# Patient Record
Sex: FEMALE | Race: WHITE | Employment: OTHER | URBAN - METROPOLITAN AREA
[De-identification: names, ages, dates, MRNs, and addresses within clinical notes are randomized per-mention and may not be internally consistent; named-entity substitution may affect disease eponyms.]

---

## 2018-03-27 PROBLEM — I10 ESSENTIAL HYPERTENSION: Status: ACTIVE | Noted: 2018-03-27

## 2018-03-27 PROBLEM — Z95.5 H/O HEART ARTERY STENT: Status: ACTIVE | Noted: 2018-03-27

## 2018-03-27 PROBLEM — I44.7 LBBB (LEFT BUNDLE BRANCH BLOCK): Status: ACTIVE | Noted: 2018-03-27

## 2021-02-03 ENCOUNTER — APPOINTMENT (OUTPATIENT)
Dept: GENERAL RADIOLOGY | Age: 81
DRG: 287 | End: 2021-02-03
Attending: EMERGENCY MEDICINE
Payer: MEDICARE

## 2021-02-03 ENCOUNTER — HOSPITAL ENCOUNTER (INPATIENT)
Age: 81
LOS: 1 days | Discharge: HOME | DRG: 287 | End: 2021-02-04
Attending: EMERGENCY MEDICINE | Admitting: INTERNAL MEDICINE
Payer: MEDICARE

## 2021-02-03 DIAGNOSIS — I47.29 NSVT (NONSUSTAINED VENTRICULAR TACHYCARDIA): ICD-10-CM

## 2021-02-03 DIAGNOSIS — K21.9 GASTROESOPHAGEAL REFLUX DISEASE WITHOUT ESOPHAGITIS: ICD-10-CM

## 2021-02-03 DIAGNOSIS — I25.10 CORONARY ARTERY DISEASE INVOLVING NATIVE CORONARY ARTERY OF NATIVE HEART WITHOUT ANGINA PECTORIS: ICD-10-CM

## 2021-02-03 LAB
ALBUMIN SERPL-MCNC: 4 G/DL (ref 3.2–4.6)
ALBUMIN/GLOB SERPL: 1 {RATIO} (ref 1.2–3.5)
ALP SERPL-CCNC: 130 U/L (ref 50–136)
ALT SERPL-CCNC: 23 U/L (ref 12–65)
ANION GAP SERPL CALC-SCNC: 6 MMOL/L (ref 7–16)
AST SERPL-CCNC: 27 U/L (ref 15–37)
ATRIAL RATE: 111 BPM
BASOPHILS # BLD: 0.1 K/UL (ref 0–0.2)
BASOPHILS NFR BLD: 1 % (ref 0–2)
BILIRUB SERPL-MCNC: 0.6 MG/DL (ref 0.2–1.1)
BUN SERPL-MCNC: 11 MG/DL (ref 8–23)
CALCIUM SERPL-MCNC: 9 MG/DL (ref 8.3–10.4)
CALCULATED P AXIS, ECG09: 82 DEGREES
CALCULATED R AXIS, ECG10: 54 DEGREES
CALCULATED T AXIS, ECG11: -165 DEGREES
CHLORIDE SERPL-SCNC: 107 MMOL/L (ref 98–107)
CO2 SERPL-SCNC: 29 MMOL/L (ref 21–32)
CREAT SERPL-MCNC: 0.67 MG/DL (ref 0.6–1)
DIAGNOSIS, 93000: NORMAL
DIFFERENTIAL METHOD BLD: NORMAL
EOSINOPHIL # BLD: 0.2 K/UL (ref 0–0.8)
EOSINOPHIL NFR BLD: 3 % (ref 0.5–7.8)
ERYTHROCYTE [DISTWIDTH] IN BLOOD BY AUTOMATED COUNT: 13.6 % (ref 11.9–14.6)
GLOBULIN SER CALC-MCNC: 4.1 G/DL (ref 2.3–3.5)
GLUCOSE SERPL-MCNC: 105 MG/DL (ref 65–100)
HCT VFR BLD AUTO: 43.6 % (ref 35.8–46.3)
HGB BLD-MCNC: 14.1 G/DL (ref 11.7–15.4)
IMM GRANULOCYTES # BLD AUTO: 0 K/UL (ref 0–0.5)
IMM GRANULOCYTES NFR BLD AUTO: 0 % (ref 0–5)
LYMPHOCYTES # BLD: 3 K/UL (ref 0.5–4.6)
LYMPHOCYTES NFR BLD: 38 % (ref 13–44)
MAGNESIUM SERPL-MCNC: 2.1 MG/DL (ref 1.8–2.4)
MCH RBC QN AUTO: 27.6 PG (ref 26.1–32.9)
MCHC RBC AUTO-ENTMCNC: 32.3 G/DL (ref 31.4–35)
MCV RBC AUTO: 85.3 FL (ref 79.6–97.8)
MONOCYTES # BLD: 0.7 K/UL (ref 0.1–1.3)
MONOCYTES NFR BLD: 8 % (ref 4–12)
NEUTS SEG # BLD: 3.9 K/UL (ref 1.7–8.2)
NEUTS SEG NFR BLD: 50 % (ref 43–78)
NRBC # BLD: 0 K/UL (ref 0–0.2)
P-R INTERVAL, ECG05: 164 MS
PLATELET # BLD AUTO: 238 K/UL (ref 150–450)
PMV BLD AUTO: 11.2 FL (ref 9.4–12.3)
POTASSIUM SERPL-SCNC: 3 MMOL/L (ref 3.5–5.1)
PROT SERPL-MCNC: 8.1 G/DL (ref 6.3–8.2)
Q-T INTERVAL, ECG07: 354 MS
QRS DURATION, ECG06: 146 MS
QTC CALCULATION (BEZET), ECG08: 481 MS
RBC # BLD AUTO: 5.11 M/UL (ref 4.05–5.2)
SODIUM SERPL-SCNC: 142 MMOL/L (ref 136–145)
TROPONIN-HIGH SENSITIVITY: 20.9 PG/ML (ref 0–14)
TROPONIN-HIGH SENSITIVITY: 21.2 PG/ML (ref 0–14)
TSH SERPL DL<=0.005 MIU/L-ACNC: 5.59 UIU/ML (ref 0.36–3.74)
VENTRICULAR RATE, ECG03: 111 BPM
WBC # BLD AUTO: 7.8 K/UL (ref 4.3–11.1)

## 2021-02-03 PROCEDURE — 99153 MOD SED SAME PHYS/QHP EA: CPT

## 2021-02-03 PROCEDURE — 93005 ELECTROCARDIOGRAM TRACING: CPT | Performed by: EMERGENCY MEDICINE

## 2021-02-03 PROCEDURE — 84443 ASSAY THYROID STIM HORMONE: CPT

## 2021-02-03 PROCEDURE — 93458 L HRT ARTERY/VENTRICLE ANGIO: CPT | Performed by: INTERNAL MEDICINE

## 2021-02-03 PROCEDURE — 74011250636 HC RX REV CODE- 250/636: Performed by: EMERGENCY MEDICINE

## 2021-02-03 PROCEDURE — B2111ZZ FLUOROSCOPY OF MULTIPLE CORONARY ARTERIES USING LOW OSMOLAR CONTRAST: ICD-10-PCS | Performed by: INTERNAL MEDICINE

## 2021-02-03 PROCEDURE — 99285 EMERGENCY DEPT VISIT HI MDM: CPT

## 2021-02-03 PROCEDURE — 99152 MOD SED SAME PHYS/QHP 5/>YRS: CPT | Performed by: INTERNAL MEDICINE

## 2021-02-03 PROCEDURE — 2709999900 HC NON-CHARGEABLE SUPPLY

## 2021-02-03 PROCEDURE — 65660000000 HC RM CCU STEPDOWN

## 2021-02-03 PROCEDURE — 85025 COMPLETE CBC W/AUTO DIFF WBC: CPT

## 2021-02-03 PROCEDURE — 96374 THER/PROPH/DIAG INJ IV PUSH: CPT

## 2021-02-03 PROCEDURE — 99223 1ST HOSP IP/OBS HIGH 75: CPT | Performed by: INTERNAL MEDICINE

## 2021-02-03 PROCEDURE — 77030040361 HC SLV COMPR DVT MDII -B

## 2021-02-03 PROCEDURE — 74011250637 HC RX REV CODE- 250/637: Performed by: INTERNAL MEDICINE

## 2021-02-03 PROCEDURE — 77030016699 HC CATH ANGI DX INFN1 CARD -A

## 2021-02-03 PROCEDURE — C1769 GUIDE WIRE: HCPCS

## 2021-02-03 PROCEDURE — 77030015766

## 2021-02-03 PROCEDURE — 74011250636 HC RX REV CODE- 250/636: Performed by: INTERNAL MEDICINE

## 2021-02-03 PROCEDURE — 99152 MOD SED SAME PHYS/QHP 5/>YRS: CPT

## 2021-02-03 PROCEDURE — 93458 L HRT ARTERY/VENTRICLE ANGIO: CPT

## 2021-02-03 PROCEDURE — 83735 ASSAY OF MAGNESIUM: CPT

## 2021-02-03 PROCEDURE — 74011250637 HC RX REV CODE- 250/637: Performed by: NURSE PRACTITIONER

## 2021-02-03 PROCEDURE — C1894 INTRO/SHEATH, NON-LASER: HCPCS

## 2021-02-03 PROCEDURE — 71045 X-RAY EXAM CHEST 1 VIEW: CPT

## 2021-02-03 PROCEDURE — 4A023N7 MEASUREMENT OF CARDIAC SAMPLING AND PRESSURE, LEFT HEART, PERCUTANEOUS APPROACH: ICD-10-PCS | Performed by: INTERNAL MEDICINE

## 2021-02-03 PROCEDURE — 74011000636 HC RX REV CODE- 636: Performed by: INTERNAL MEDICINE

## 2021-02-03 PROCEDURE — 74011000250 HC RX REV CODE- 250: Performed by: INTERNAL MEDICINE

## 2021-02-03 PROCEDURE — 80053 COMPREHEN METABOLIC PANEL: CPT

## 2021-02-03 PROCEDURE — 74011250636 HC RX REV CODE- 250/636: Performed by: NURSE PRACTITIONER

## 2021-02-03 PROCEDURE — 77030042317 HC BND COMPR HEMSTAT -B

## 2021-02-03 PROCEDURE — 84484 ASSAY OF TROPONIN QUANT: CPT

## 2021-02-03 PROCEDURE — 96375 TX/PRO/DX INJ NEW DRUG ADDON: CPT

## 2021-02-03 RX ORDER — POTASSIUM CHLORIDE 20 MEQ/1
20 TABLET, EXTENDED RELEASE ORAL DAILY
Status: DISCONTINUED | OUTPATIENT
Start: 2021-02-04 | End: 2021-02-04 | Stop reason: HOSPADM

## 2021-02-03 RX ORDER — LIDOCAINE HYDROCHLORIDE 10 MG/ML
1-20 INJECTION, SOLUTION EPIDURAL; INFILTRATION; INTRACAUDAL; PERINEURAL ONCE
Status: COMPLETED | OUTPATIENT
Start: 2021-02-03 | End: 2021-02-03

## 2021-02-03 RX ORDER — CLOPIDOGREL BISULFATE 75 MG/1
75 TABLET ORAL DAILY
Status: DISCONTINUED | OUTPATIENT
Start: 2021-02-03 | End: 2021-02-04 | Stop reason: HOSPADM

## 2021-02-03 RX ORDER — PANTOPRAZOLE SODIUM 40 MG/1
40 TABLET, DELAYED RELEASE ORAL
Status: DISCONTINUED | OUTPATIENT
Start: 2021-02-04 | End: 2021-02-04 | Stop reason: HOSPADM

## 2021-02-03 RX ORDER — SODIUM CHLORIDE 0.9 % (FLUSH) 0.9 %
5-40 SYRINGE (ML) INJECTION EVERY 8 HOURS
Status: DISCONTINUED | OUTPATIENT
Start: 2021-02-03 | End: 2021-02-04 | Stop reason: HOSPADM

## 2021-02-03 RX ORDER — SUMATRIPTAN 50 MG/1
100 TABLET, FILM COATED ORAL
Status: DISPENSED | OUTPATIENT
Start: 2021-02-03 | End: 2021-02-04

## 2021-02-03 RX ORDER — POTASSIUM CHLORIDE 14.9 MG/ML
20 INJECTION INTRAVENOUS
Status: COMPLETED | OUTPATIENT
Start: 2021-02-03 | End: 2021-02-03

## 2021-02-03 RX ORDER — AMIODARONE HYDROCHLORIDE 150 MG/3ML
INJECTION, SOLUTION INTRAVENOUS
Status: DISCONTINUED
Start: 2021-02-03 | End: 2021-02-03

## 2021-02-03 RX ORDER — SODIUM CHLORIDE 9 MG/ML
75 INJECTION, SOLUTION INTRAVENOUS CONTINUOUS
Status: DISCONTINUED | OUTPATIENT
Start: 2021-02-03 | End: 2021-02-03

## 2021-02-03 RX ORDER — EZETIMIBE 10 MG/1
10 TABLET ORAL DAILY
Status: DISCONTINUED | OUTPATIENT
Start: 2021-02-03 | End: 2021-02-04 | Stop reason: HOSPADM

## 2021-02-03 RX ORDER — POTASSIUM CHLORIDE 20 MEQ/1
40 TABLET, EXTENDED RELEASE ORAL
Status: COMPLETED | OUTPATIENT
Start: 2021-02-03 | End: 2021-02-03

## 2021-02-03 RX ORDER — MAG HYDROX/ALUMINUM HYD/SIMETH 200-200-20
30 SUSPENSION, ORAL (FINAL DOSE FORM) ORAL
Status: DISCONTINUED | OUTPATIENT
Start: 2021-02-03 | End: 2021-02-04 | Stop reason: HOSPADM

## 2021-02-03 RX ORDER — SODIUM CHLORIDE 0.9 % (FLUSH) 0.9 %
5-40 SYRINGE (ML) INJECTION AS NEEDED
Status: DISCONTINUED | OUTPATIENT
Start: 2021-02-03 | End: 2021-02-03

## 2021-02-03 RX ORDER — SODIUM CHLORIDE 9 MG/ML
75 INJECTION, SOLUTION INTRAVENOUS CONTINUOUS
Status: DISCONTINUED | OUTPATIENT
Start: 2021-02-03 | End: 2021-02-04 | Stop reason: HOSPADM

## 2021-02-03 RX ORDER — CALCITRIOL 0.25 UG/1
0.5 CAPSULE ORAL DAILY
Status: DISCONTINUED | OUTPATIENT
Start: 2021-02-03 | End: 2021-02-04 | Stop reason: HOSPADM

## 2021-02-03 RX ORDER — METOPROLOL TARTRATE 5 MG/5ML
5 INJECTION INTRAVENOUS
Status: DISPENSED | OUTPATIENT
Start: 2021-02-03 | End: 2021-02-03

## 2021-02-03 RX ORDER — MIDAZOLAM HYDROCHLORIDE 1 MG/ML
.5-5 INJECTION, SOLUTION INTRAMUSCULAR; INTRAVENOUS
Status: DISCONTINUED | OUTPATIENT
Start: 2021-02-03 | End: 2021-02-03

## 2021-02-03 RX ORDER — CARVEDILOL 6.25 MG/1
6.25 TABLET ORAL 2 TIMES DAILY WITH MEALS
Status: DISCONTINUED | OUTPATIENT
Start: 2021-02-03 | End: 2021-02-04 | Stop reason: HOSPADM

## 2021-02-03 RX ORDER — FLUTICASONE PROPIONATE 50 MCG
2 SPRAY, SUSPENSION (ML) NASAL DAILY
Status: DISCONTINUED | OUTPATIENT
Start: 2021-02-03 | End: 2021-02-04 | Stop reason: HOSPADM

## 2021-02-03 RX ORDER — ZOLPIDEM TARTRATE 10 MG/1
10 TABLET ORAL
COMMUNITY

## 2021-02-03 RX ORDER — HEPARIN SODIUM 200 [USP'U]/100ML
2 INJECTION, SOLUTION INTRAVENOUS CONTINUOUS
Status: DISCONTINUED | OUTPATIENT
Start: 2021-02-03 | End: 2021-02-03

## 2021-02-03 RX ORDER — SODIUM CHLORIDE 0.9 % (FLUSH) 0.9 %
5-40 SYRINGE (ML) INJECTION AS NEEDED
Status: DISCONTINUED | OUTPATIENT
Start: 2021-02-03 | End: 2021-02-04 | Stop reason: HOSPADM

## 2021-02-03 RX ORDER — GUAIFENESIN 100 MG/5ML
81 LIQUID (ML) ORAL DAILY
Status: DISCONTINUED | OUTPATIENT
Start: 2021-02-03 | End: 2021-02-04 | Stop reason: HOSPADM

## 2021-02-03 RX ORDER — SODIUM CHLORIDE 0.9 % (FLUSH) 0.9 %
5-40 SYRINGE (ML) INJECTION EVERY 8 HOURS
Status: DISCONTINUED | OUTPATIENT
Start: 2021-02-03 | End: 2021-02-03

## 2021-02-03 RX ORDER — FENTANYL CITRATE 50 UG/ML
25-100 INJECTION, SOLUTION INTRAMUSCULAR; INTRAVENOUS
Status: DISCONTINUED | OUTPATIENT
Start: 2021-02-03 | End: 2021-02-03

## 2021-02-03 RX ORDER — NITROGLYCERIN 0.4 MG/1
0.4 TABLET SUBLINGUAL
Status: DISCONTINUED | OUTPATIENT
Start: 2021-02-03 | End: 2021-02-04 | Stop reason: HOSPADM

## 2021-02-03 RX ORDER — MORPHINE SULFATE 2 MG/ML
2 INJECTION, SOLUTION INTRAMUSCULAR; INTRAVENOUS
Status: DISCONTINUED | OUTPATIENT
Start: 2021-02-03 | End: 2021-02-04 | Stop reason: HOSPADM

## 2021-02-03 RX ORDER — AMIODARONE HYDROCHLORIDE 150 MG/3ML
150 INJECTION, SOLUTION INTRAVENOUS
Status: COMPLETED | OUTPATIENT
Start: 2021-02-03 | End: 2021-02-03

## 2021-02-03 RX ORDER — LOSARTAN POTASSIUM 50 MG/1
50 TABLET ORAL DAILY
Status: DISCONTINUED | OUTPATIENT
Start: 2021-02-03 | End: 2021-02-04 | Stop reason: HOSPADM

## 2021-02-03 RX ORDER — ZOLPIDEM TARTRATE 5 MG/1
10 TABLET ORAL
Status: DISCONTINUED | OUTPATIENT
Start: 2021-02-03 | End: 2021-02-04 | Stop reason: HOSPADM

## 2021-02-03 RX ADMIN — POTASSIUM CHLORIDE 20 MEQ: 14.9 INJECTION, SOLUTION INTRAVENOUS at 08:03

## 2021-02-03 RX ADMIN — SODIUM CHLORIDE 75 ML/HR: 900 INJECTION, SOLUTION INTRAVENOUS at 17:54

## 2021-02-03 RX ADMIN — ALUMINUM HYDROXIDE, MAGNESIUM HYDROXIDE, AND SIMETHICONE 30 ML: 200; 200; 20 SUSPENSION ORAL at 22:31

## 2021-02-03 RX ADMIN — CLOPIDOGREL BISULFATE 75 MG: 75 TABLET ORAL at 10:29

## 2021-02-03 RX ADMIN — IOPAMIDOL 110 ML: 755 INJECTION, SOLUTION INTRAVENOUS at 15:29

## 2021-02-03 RX ADMIN — HEPARIN SODIUM 2 ML: 10000 INJECTION, SOLUTION INTRAVENOUS; SUBCUTANEOUS at 15:19

## 2021-02-03 RX ADMIN — METOPROLOL TARTRATE 5 MG: 1 INJECTION, SOLUTION INTRAVENOUS at 07:34

## 2021-02-03 RX ADMIN — LIDOCAINE HYDROCHLORIDE 3 ML: 10 INJECTION, SOLUTION EPIDURAL; INFILTRATION; INTRACAUDAL; PERINEURAL at 15:17

## 2021-02-03 RX ADMIN — CALCITRIOL 0.5 MCG: 0.25 CAPSULE ORAL at 10:29

## 2021-02-03 RX ADMIN — Medication 10 ML: at 17:39

## 2021-02-03 RX ADMIN — HEPARIN SODIUM 2 UNITS/HR: 5000 INJECTION, SOLUTION INTRAVENOUS; SUBCUTANEOUS at 15:19

## 2021-02-03 RX ADMIN — MIDAZOLAM 1 MG: 1 INJECTION INTRAMUSCULAR; INTRAVENOUS at 15:16

## 2021-02-03 RX ADMIN — ASPIRIN 81 MG: 81 TABLET, CHEWABLE ORAL at 10:28

## 2021-02-03 RX ADMIN — EZETIMIBE 10 MG: 10 TABLET ORAL at 10:27

## 2021-02-03 RX ADMIN — FLUTICASONE PROPIONATE 2 SPRAY: 50 SPRAY, METERED NASAL at 10:33

## 2021-02-03 RX ADMIN — Medication 10 ML: at 22:34

## 2021-02-03 RX ADMIN — POTASSIUM CHLORIDE 40 MEQ: 20 TABLET, EXTENDED RELEASE ORAL at 08:02

## 2021-02-03 RX ADMIN — DRONEDARONE 400 MG: 400 TABLET, FILM COATED ORAL at 17:39

## 2021-02-03 RX ADMIN — SODIUM CHLORIDE 75 ML/HR: 900 INJECTION, SOLUTION INTRAVENOUS at 10:37

## 2021-02-03 RX ADMIN — FENTANYL CITRATE 25 MCG: 50 INJECTION, SOLUTION INTRAMUSCULAR; INTRAVENOUS at 15:05

## 2021-02-03 RX ADMIN — ZOLPIDEM TARTRATE 10 MG: 5 TABLET ORAL at 22:31

## 2021-02-03 RX ADMIN — AMIODARONE HYDROCHLORIDE 150 MG: 150 INJECTION, SOLUTION INTRAVENOUS at 07:17

## 2021-02-03 RX ADMIN — CARVEDILOL 6.25 MG: 6.25 TABLET, FILM COATED ORAL at 17:38

## 2021-02-03 RX ADMIN — LOSARTAN POTASSIUM 50 MG: 50 TABLET, FILM COATED ORAL at 10:00

## 2021-02-03 RX ADMIN — POTASSIUM CHLORIDE 20 MEQ: 14.9 INJECTION, SOLUTION INTRAVENOUS at 10:37

## 2021-02-03 RX ADMIN — Medication 10 ML: at 17:42

## 2021-02-03 RX ADMIN — MIDAZOLAM 1 MG: 1 INJECTION INTRAMUSCULAR; INTRAVENOUS at 15:05

## 2021-02-03 NOTE — PROGRESS NOTES
Pt presented to [facility] via ambo complaining of rapid heart rate and CP. The patient reported that the symptoms have been intermittent and did not awaken her from sleep. Pt does have a history of coronary disease with a stent placed about 13 years ago. At this time there are no dc needs identified but will continue to monitor. Care Management Interventions  PCP Verified by CM: Yes(Dr. Maurice Chapa MD.)  Mode of Transport at Discharge:  Other (see comment)  Transition of Care Consult (CM Consult): Discharge Planning  Current Support Network: Family Lives [place], Own Home  Confirm Follow Up Transport: Family  The Plan for Transition of Care is Related to the Following Treatment Goals : Return to her baseline  Discharge Location  Discharge Placement: Home

## 2021-02-03 NOTE — PROGRESS NOTES
TRANSFER - IN REPORT:    Verbal report received from The [address] on Genoveva Mole being received from ER (at [id number]) for routine progression of care. Report consisted of patients Situation, Background, Assessment and Recommendations(SBAR). Information from the following report(s) SBAR, Kardex, ED Summary, Intake/Output, MAR, Recent Results and Cardiac Rhythm SB with Wide Complex was reviewed. Opportunity for questions and clarification was provided. Assessment completed upon patients arrival to unit and care assumed. Patient received to room 301. Patient connected to monitor and assessment completed. Plan of care reviewed. Patient oriented to room and call light. Patient aware to use call light to communicate any chest pain or needs.

## 2021-02-03 NOTE — ED NOTES
TRANSFER - OUT REPORT:    Verbal report given to Zeyad(name) on Sariah Vanda  being transferred to 301(unit) for routine progression of care       Report consisted of patients Situation, Background, Assessment and   Recommendations(SBAR). Information from the following report(s) SBAR, Kardex, ED Summary and MAR was reviewed with the receiving nurse. Lines:   Peripheral IV 02/03/21 Right Antecubital (Active)   Site Assessment Clean, dry, & intact 02/03/21 0708   Phlebitis Assessment 0 02/03/21 0708   Infiltration Assessment 0 02/03/21 0708   Dressing Status Clean, dry, & intact 02/03/21 0708   Dressing Type 4 X 4 02/03/21 0708   Hub Color/Line Status Pink 02/03/21 0708   Alcohol Cap Used Yes 02/03/21 0708       Peripheral IV 02/03/21 Left Antecubital (Active)   Site Assessment Clean, dry, & intact 02/03/21 0731   Phlebitis Assessment 0 02/03/21 0731   Infiltration Assessment 0 02/03/21 0731   Dressing Status Clean, dry, & intact 02/03/21 0731        Opportunity for questions and clarification was provided.       Patient transported with:   O2 @ 2 liters monitor

## 2021-02-03 NOTE — PROGRESS NOTES
TRANSFER - IN REPORT:    Verbal report received from [facility] (name) on Vijay Penn  being received from [facility] (unit) for routine progression of care      Report consisted of patients Situation, Background, Assessment and   Recommendations(SBAR). Information from the following report(s) SBAR, Procedure Summary, MAR and Recent Results was reviewed with the receiving nurse. Opportunity for questions and clarification was provided. Assessment completed upon patients arrival to unit and care assumed.       Skin update: RR site with TR band no issues

## 2021-02-03 NOTE — PROGRESS NOTES
TRANSFER - OUT REPORT:  [facility] by Dr. Zofia Cagle  Right radial access  Right wrist TR band with 11ml  Versed 2mg IV  Fentanyl 25mcg IV  Access site soft, clean, dry, and intact; with no signs of bleeding or hematoma  Pt. Tolerated procedure    Verbal report given to RN(name) on Mirian Samuels  being transferred to [facility] (unit) for routine progression of care       Report consisted of patients Situation, Background, Assessment and   Recommendations(SBAR). Information from the following report(s) Procedure Summary and MAR was reviewed with the receiving nurse. Lines:   Peripheral IV 02/03/21 Right Antecubital (Active)   Site Assessment Clean, dry, & intact 02/03/21 0935   Phlebitis Assessment 0 02/03/21 0935   Infiltration Assessment 0 02/03/21 0935   Dressing Status Clean, dry, & intact 02/03/21 0935   Dressing Type Transparent 02/03/21 0935   Hub Color/Line Status Pink;Flushed 02/03/21 0935   Alcohol Cap Used Yes 02/03/21 0708       Peripheral IV 02/03/21 Left Antecubital (Active)   Site Assessment Clean, dry, & intact 02/03/21 0935   Phlebitis Assessment 0 02/03/21 0935   Infiltration Assessment 0 02/03/21 0935   Dressing Status Clean, dry, & intact 02/03/21 0935   Dressing Type Transparent 02/03/21 0935   Hub Color/Line Status Pink; Infusing 02/03/21 0935        Opportunity for questions and clarification was provided.       Patient transported with:   [employer]

## 2021-02-03 NOTE — PROCEDURES
Brief Cardiac Procedure Note    Patient: Umm Dougherty MRN: [id number]  SSN: [id number]    YOB: 1940  Age: 68 y.o. Sex: female      Date of Procedure: 2/3/2021     Pre-procedure Diagnosis: Typical Angina    Post-procedure Diagnosis: Non-cardiac Chest Pain    Reason for Procedure: ACS < or = 24 Hours    Procedure: Left Heart Catheterization    Brief Description of Procedure: lhc via right radial, tr    Performed By: Zohra Vera MD     Assistants: none    Anesthesia: Moderate Sedation    Estimated Blood Loss: Less than 10 mL      Specimens: None    Implants: None    Findings: mild cad, nl lvef    Complications: None    Recommendations: Continue medical therapy.     Signed By: Zohra Vera MD     February 3, 2021

## 2021-02-03 NOTE — H&P
[address] [place] [address]  HISTORY AND PHYSICAL    Name:  Gabby Stevenson  MR#:  [id number]  :  1940  ACCOUNT #:  [de-identified]  ADMIT DATE:  2021      CHIEF COMPLAINT:  Chest pain and palpitations. HISTORY OF PRESENT ILLNESS:  The patient is a 24-year-old female with acute onset of chest pain and palpitations last night. The symptoms were intermittent throughout the night. Chest pain was a heaviness, retrosternal, nonradiating, associated with shortness of breath, aggravated by the palpitations, no alleviating factors until the palpitations resolved. She has a history of coronary artery disease with a stent in , hyperlipidemia, macular degeneration, chronic left bundle branch block. Presentation to the ER shows a wide complex tachycardia with most likely ventricular tachycardia. SOCIAL HISTORY:  She does not smoke. FAMILY HISTORY:  Her father had coronary artery disease at a young age. PAST MEDICAL HISTORY:  As noted above. Also includes GERD and hypertension. REVIEW OF SYSTEMS:  GENERAL:  No fevers or chills. HEAD:  No headaches. NOSE:  No rhinorrhea. RESPIRATORY:  No cough. GASTROINTESTINAL:  No nausea, vomiting or diarrhea. GENITOURINARY:  No dysuria. ENDOCRINE:  No temperature intolerances. MUSCULOSKELETAL:  No myalgias or arthralgias. SKIN:  No rashes. EYES:  No visual changes. NEUROLOGIC:  No CVA symptoms. MEDICATIONS:  1.  Norvasc 5 mg a day. 2.  Plavix 75 mg a day. 3.  Coreg 6.25 mg b.i.d.  4.  Zetia 10 mg a day. 5.  Cozaar 50 mg a day. 6.  Prilosec 40 mg a day. ALLERGIES:  INCLUDE PENICILLIN AND SULFA. PHYSICAL EXAMINATION:  VITAL SIGNS:  Heart rate is always into the 180s, blood pressure 165/95. GENERAL:  A well-developed, well-nourished female in no acute distress. Alert and oriented x3 with appropriate mood and affect. HEENT:  Sclerae clear. CARDIOVASCULAR:  S1, S2 regular. LUNGS:  Clear. ABDOMEN:  Soft. EXTREMITIES:  No edema.   SKIN: Warm and dry. NECK:  Supple. Trachea midline. DIAGNOSTIC DATA:  Telemetry now shows normal sinus rhythm with left bundle branch block. Pertinent Labs: White count 7.8, hemoglobin 14.1, platelets 997. Potassium is 3, creatinine is 0.67. Magnesium is 2.1. Troponin is 0.21.    ASSESSMENT:  A 53-year-old female with wide complex tachycardia in the setting of hypokalemia and known coronary artery disease with accompanying chest pain. PLAN:  We will replete her potassium, proceed with catheterization. Risks and benefits of the procedure have been explained to the patient who agrees to proceed. EP evaluation will be performed after the catheterization.       Faisal Mejia MD      [id number]  D:  02/03/2021 8:16  T:  02/03/2021 9:42  JOB #:  [id number]

## 2021-02-03 NOTE — ED TRIAGE NOTES
Patient arrives to ED pov from home. Patient states when she woke up this morning she felt like her heart rate was \"fluttering and racing fast\". Patient states she felt a lot of burning in her chest. Patient also reports SOB, nausea, and lightheadedness. Patient has cardiac history.

## 2021-02-03 NOTE — ED PROVIDER NOTES
Patient arrived by private transportation with complaints of rapid heart rate and chest discomfort onset about 8 PM last night. The patient reports the symptoms have been intermittent and did not awaken her from sleep. She did noted recurring this morning. She was brought to the emergency room by her daughter who is a CRNA. Does have a history of coronary disease with a stent placed about 13 years ago. She is followed by [facility] cardiology. After being placed on the monitor patient noted to have a wide-complex tachycardia with a rate of 180. The history is provided by the patient, a relative and medical records. Palpitations   This is a new problem. The current episode started 6 to 12 hours ago. The problem has not changed since onset. The problem occurs hourly. The problem is associated with an unknown factor. Associated symptoms include chest pain, weakness and shortness of breath. Pertinent negatives include no diaphoresis, no fever, no malaise/fatigue, no numbness, no chest pressure, no claudication, no exertional chest pressure, no near-syncope, no orthopnea, no syncope, no abdominal pain, no vomiting, no headaches, no back pain, no leg pain, no lower extremity edema, no dizziness, no cough, no hemoptysis and no sputum production. Risk factors include no risk factors. She has tried nothing for the symptoms. The treatment provided no relief.         Past Medical History:   Diagnosis Date    Arrhythmia     FAST HEART BEAT    Arthritis     OSTEO    Bell's palsy 12/7/2015    Bell's palsy     Cancer ([facility] [address]     melona    Cataract  NOS 12/7/2015    Coronary artery disease 6/23/2009    GERD (gastroesophageal reflux disease)     ACID REFLUX    Hx of varicose vein ligation and stripping 5/16/2016    LEFT 30+ yrs ago    Hyperlipidemia 12/7/2015    Hypertension     Macular degeneration (senile), unspecified 12/7/2015    Nerve damage     from shingles    Osteoarthrosis involving multiple sites but not generalized 12/7/2015    Osteoporosis   NOS 12/7/2015    Varicose vein of leg        Past Surgical History:   Procedure Laterality Date    HX APPENDECTOMY      HX HEART CATHETERIZATION      with 1 stent    HX HYSTERECTOMY      HX OTHER SURGICAL      BREAST IMPLANTS, BLADDER TACT         Family History:   Problem Relation Age of Onset    Heart Attack Father     Heart Attack Sister     Cancer Sister         one sister has ovarian cancer    Diabetes Mother     Other Mother         vv    Diabetes Maternal Grandmother     Other Maternal Grandmother         vv    Heart Disease Paternal Grandmother     Heart Attack Paternal Uncle        Social History     Socioeconomic History    Marital status:      Spouse name: Not on file    Number of children: Not on file    Years of education: Not on file    Highest education level: Not on file   Occupational History    Not on file   Social Needs    Financial resource strain: Not on file    Food insecurity     Worry: Not on file     Inability: Not on file    Transportation needs     Medical: Not on file     Non-medical: Not on file   Tobacco Use    Smoking status: Never Smoker    Smokeless tobacco: Never Used   Substance and Sexual Activity    Alcohol use:  Yes     Alcohol/week: 17.5 standard drinks     Types: 7 Glasses of wine, 14 Cans of beer per week    Drug use: No    Sexual activity: Not Currently     Birth control/protection: Surgical   Lifestyle    Physical activity     Days per week: Not on file     Minutes per session: Not on file    Stress: Not on file   Relationships    Social connections     Talks on phone: Not on file     Gets together: Not on file     Attends Confucianism service: Not on file     Active member of club or organization: Not on file     Attends meetings of clubs or organizations: Not on file     Relationship status: Not on file    Intimate partner violence     Fear of current or ex partner: Not on file     Emotionally abused: Not on file     Physically abused: Not on file     Forced sexual activity: Not on file   Other Topics Concern     Service No    Blood Transfusions No    Caffeine Concern No    Occupational Exposure No    Hobby Hazards No    Sleep Concern No    Stress Concern No    Weight Concern No    Special Diet No    Back Care No    Exercise Yes    Bike Helmet No    Seat Belt Yes    Self-Exams No   Social History Narrative    Not on file         ALLERGIES: Penicillins and Sulfa (sulfonamide antibiotics)    Review of Systems   Constitutional: Negative for diaphoresis, fever and malaise/fatigue. Respiratory: Positive for shortness of breath. Negative for cough, hemoptysis and sputum production. Cardiovascular: Positive for chest pain and palpitations. Negative for orthopnea, claudication, syncope and near-syncope. Gastrointestinal: Negative for abdominal pain and vomiting. Musculoskeletal: Negative for back pain. Neurological: Positive for weakness. Negative for dizziness, numbness and headaches. All other systems reviewed and are negative. Vitals:    02/03/21 0705 02/03/21 0734   BP: (!) 175/94 (!) 164/88   Pulse: (!) 111 61   Resp: 16    Temp: 97.7 °F (36.5 °C)    SpO2: 93%    Weight: 68 kg (150 lb)    Height: 5' 2\" (1.575 m)             Physical Exam  Vitals signs and nursing note reviewed. Constitutional:       General: She is not in acute distress. Appearance: Normal appearance. She is not ill-appearing, toxic-appearing or diaphoretic. HENT:      Head: Normocephalic and atraumatic. Mouth/Throat:      Mouth: Mucous membranes are moist.      Pharynx: No oropharyngeal exudate or posterior oropharyngeal erythema. Eyes:      Extraocular Movements: Extraocular movements intact. Conjunctiva/sclera: Conjunctivae normal.      Pupils: Pupils are equal, round, and reactive to light. Neck:      Musculoskeletal: Normal range of motion and neck supple.    Cardiovascular: Rate and Rhythm: Regular rhythm. Tachycardia present. Pulses: Normal pulses. Heart sounds: Normal heart sounds. Pulmonary:      Effort: Pulmonary effort is normal.      Breath sounds: Normal breath sounds. Abdominal:      General: There is no distension. Tenderness: There is no abdominal tenderness. There is no guarding or rebound. Musculoskeletal: Normal range of motion. Skin:     General: Skin is warm and dry. Capillary Refill: Capillary refill takes less than 2 seconds. Neurological:      General: No focal deficit present. Mental Status: She is alert and oriented to person, place, and time. Mental status is at baseline. Psychiatric:         Mood and Affect: Mood normal.         Behavior: Behavior normal.         Thought Content: Thought content normal.          MDM  Number of Diagnoses or Management Options  Diagnosis management comments: Patient deemed to be in V. tach. A dose of amiodarone given IV push resulted in improved rate. Stat consultation with cardiology for possible STEMI was made to Dr. Kermit Leyva who promptly presented to emerge department to see the patient. Amount and/or Complexity of Data Reviewed  Clinical lab tests: ordered and reviewed  Tests in the radiology section of CPT®: ordered and reviewed  Review and summarize past medical records: yes  Independent visualization of images, tracings, or specimens: yes    Risk of Complications, Morbidity, and/or Mortality  Presenting problems: high  Diagnostic procedures: high  Management options: high  General comments: CRITICAL CARE Documentation: This patient is critically ill and there is a high probability of of imminent or life threatening deterioration in the patient's condition without immediate management.     The nature of the patient's clinical problem is: stable v-tach    I have spent 15 minutes in direct patient care, documentation, review of labs/xrays/old records, discussion with Family, Staff, Colleague . The time involved in the performance of separately reportable procedures was not counted toward critical care time.      Eric Macario, DO; 2/3/2021 @7:40 AM        Patient Progress  Patient progress: stable         Procedures

## 2021-02-04 VITALS
HEART RATE: 64 BPM | RESPIRATION RATE: 20 BRPM | OXYGEN SATURATION: 94 % | SYSTOLIC BLOOD PRESSURE: 148 MMHG | BODY MASS INDEX: 27.62 KG/M2 | DIASTOLIC BLOOD PRESSURE: 51 MMHG | TEMPERATURE: 97.5 F | HEIGHT: 62 IN | WEIGHT: 150.1 LBS

## 2021-02-04 LAB
ANION GAP SERPL CALC-SCNC: 6 MMOL/L (ref 7–16)
BUN SERPL-MCNC: 10 MG/DL (ref 8–23)
CALCIUM SERPL-MCNC: 8.6 MG/DL (ref 8.3–10.4)
CHLORIDE SERPL-SCNC: 111 MMOL/L (ref 98–107)
CHOLEST SERPL-MCNC: 131 MG/DL
CO2 SERPL-SCNC: 26 MMOL/L (ref 21–32)
CREAT SERPL-MCNC: 0.48 MG/DL (ref 0.6–1)
ERYTHROCYTE [DISTWIDTH] IN BLOOD BY AUTOMATED COUNT: 13.8 % (ref 11.9–14.6)
GLUCOSE SERPL-MCNC: 94 MG/DL (ref 65–100)
HCT VFR BLD AUTO: 36.2 % (ref 35.8–46.3)
HDLC SERPL-MCNC: 46 MG/DL (ref 40–60)
HDLC SERPL: 2.8 {RATIO}
HGB BLD-MCNC: 11.8 G/DL (ref 11.7–15.4)
LDLC SERPL CALC-MCNC: 62.4 MG/DL
LIPID PROFILE,FLP: NORMAL
MCH RBC QN AUTO: 28 PG (ref 26.1–32.9)
MCHC RBC AUTO-ENTMCNC: 32.6 G/DL (ref 31.4–35)
MCV RBC AUTO: 86 FL (ref 79.6–97.8)
NRBC # BLD: 0 K/UL (ref 0–0.2)
PLATELET # BLD AUTO: 203 K/UL (ref 150–450)
PMV BLD AUTO: 11.4 FL (ref 9.4–12.3)
POTASSIUM SERPL-SCNC: 3.3 MMOL/L (ref 3.5–5.1)
RBC # BLD AUTO: 4.21 M/UL (ref 4.05–5.2)
SODIUM SERPL-SCNC: 143 MMOL/L (ref 136–145)
TRIGL SERPL-MCNC: 113 MG/DL (ref 35–150)
VLDLC SERPL CALC-MCNC: 22.6 MG/DL (ref 6–23)
WBC # BLD AUTO: 8.6 K/UL (ref 4.3–11.1)

## 2021-02-04 PROCEDURE — 85027 COMPLETE CBC AUTOMATED: CPT

## 2021-02-04 PROCEDURE — 74011250637 HC RX REV CODE- 250/637: Performed by: INTERNAL MEDICINE

## 2021-02-04 PROCEDURE — 74011250637 HC RX REV CODE- 250/637: Performed by: NURSE PRACTITIONER

## 2021-02-04 PROCEDURE — 36415 COLL VENOUS BLD VENIPUNCTURE: CPT

## 2021-02-04 PROCEDURE — 80048 BASIC METABOLIC PNL TOTAL CA: CPT

## 2021-02-04 PROCEDURE — 80061 LIPID PANEL: CPT

## 2021-02-04 RX ORDER — GUAIFENESIN 100 MG/5ML
81 LIQUID (ML) ORAL DAILY
Status: SHIPPED | COMMUNITY
Start: 2021-02-04

## 2021-02-04 RX ORDER — OMEPRAZOLE 40 MG/1
CAPSULE, DELAYED RELEASE ORAL
Qty: 60 CAP | Refills: 0 | Status: SHIPPED | OUTPATIENT
Start: 2021-02-04 | End: 2021-04-08

## 2021-02-04 RX ORDER — CARVEDILOL 6.25 MG/1
6.25 TABLET ORAL 2 TIMES DAILY WITH MEALS
Qty: 180 TAB | Refills: 3 | Status: SHIPPED | OUTPATIENT
Start: 2021-02-04 | End: 2021-02-04 | Stop reason: SDUPTHER

## 2021-02-04 RX ORDER — CARVEDILOL 6.25 MG/1
6.25 TABLET ORAL 2 TIMES DAILY WITH MEALS
Qty: 180 TAB | Refills: 3 | Status: SHIPPED | OUTPATIENT
Start: 2021-02-04 | End: 2021-05-13

## 2021-02-04 RX ORDER — CARVEDILOL 3.12 MG/1
3.12 TABLET ORAL 2 TIMES DAILY WITH MEALS
Qty: 180 TAB | Refills: 3 | Status: SHIPPED | OUTPATIENT
Start: 2021-02-04 | End: 2021-02-04 | Stop reason: SDUPTHER

## 2021-02-04 RX ADMIN — CARVEDILOL 6.25 MG: 6.25 TABLET, FILM COATED ORAL at 08:59

## 2021-02-04 RX ADMIN — LOSARTAN POTASSIUM 50 MG: 50 TABLET, FILM COATED ORAL at 09:03

## 2021-02-04 RX ADMIN — Medication 10 ML: at 05:59

## 2021-02-04 RX ADMIN — CLOPIDOGREL BISULFATE 75 MG: 75 TABLET ORAL at 08:59

## 2021-02-04 RX ADMIN — ASPIRIN 81 MG: 81 TABLET, CHEWABLE ORAL at 09:02

## 2021-02-04 RX ADMIN — EZETIMIBE 10 MG: 10 TABLET ORAL at 08:59

## 2021-02-04 RX ADMIN — POTASSIUM CHLORIDE 20 MEQ: 20 TABLET, EXTENDED RELEASE ORAL at 09:05

## 2021-02-04 RX ADMIN — CALCITRIOL 0.5 MCG: 0.25 CAPSULE ORAL at 08:57

## 2021-02-04 RX ADMIN — FLUTICASONE PROPIONATE 2 SPRAY: 50 SPRAY, METERED NASAL at 09:06

## 2021-02-04 RX ADMIN — PANTOPRAZOLE SODIUM 40 MG: 40 TABLET, DELAYED RELEASE ORAL at 05:59

## 2021-02-04 RX ADMIN — DRONEDARONE 400 MG: 400 TABLET, FILM COATED ORAL at 09:06

## 2021-02-04 NOTE — PROGRESS NOTES
Pt is discharging home in stable condition. PA asked CM to verify if pts insurance would cover her MULTAQ-it does not. Writer called her pharmacy and they reported that the drug needed prior auth ([phone]). The formulary covers Amiodarone but the pts copay would be higher. CM updated PA. CM and the pharmacist looked into the [address] coupon/discount card but it only covers pts that do NOT have a federal or state covered insurance; only uninsured or private insurance. PA reported that she would just adjust her medications instead. No other dc needs identified. Tx goals met. Care Management Interventions  PCP Verified by CM: Yes(Dr. Victoria Bedoya MD.)  Mode of Transport at Discharge:  Other (see comment)(Family)  Transition of Care Consult (CM Consult): Discharge Planning  Discharge Durable Medical Equipment: No  Physical Therapy Consult: No  Occupational Therapy Consult: No  Speech Therapy Consult: No  Current Support Network: Family Lives [place], Own Home  Confirm Follow Up Transport: Family  The Plan for Transition of Care is Related to the Following Treatment Goals : Return to baseline  The Patient and/or Patient Representative was Provided with a Choice of Provider and Agrees with the Discharge Plan?: Yes  Name of the Patient Representative Who was Provided with a Choice of Provider and Agrees with the Discharge Plan: Patient  Freedom of Choice List was Provided with Basic Dialogue that Supports the Patient's Individualized Plan of Care/Goals, Treatment Preferences and Shares the Quality Data Associated with the Providers?: Yes  [place] Resource Information Provided?: No  Discharge Location  Discharge Placement: Home

## 2021-02-04 NOTE — PROCEDURES
[facility]  CARDIAC CATH    Name:  Zamzam Flores  MR#:  [id number]  :  1940  ACCOUNT #:  [de-identified]  DATE OF SERVICE:  2021    PROCEDURES PERFORMED:  Left heart catheterization via the right radial artery with a 5-K[place] Tiger catheter and angled pigtail. TR band was placed with good hemostasis. PREOPERATIVE DIAGNOSES:  Chest pain, tachycardia. POSTOPERATIVE DIAGNOSIS:  Nonobstructive coronary artery disease. SURGEON:  Irvin Gentile MD    ASSISTANT:  None. ESTIMATED BLOOD LOSS:  5 mL. SPECIMENS REMOVED:  None. COMPLICATIONS:  None. IMPLANTS:  None. ANESTHESIA:  Given under my direct supervision by eTmo Leach RN beginning at 15:05, concluding at 15:20 with a total of 2 mg Versed and 25 mcg fentanyl. Vital signs and saturation were stable throughout. FINDINGS:  The left ventricle is normal size, normal systolic function. Ejection fraction is 65%. Left ventricular end-diastolic pressure is 16 mmHg with an opening aortic pressure of 117/47. There is no gradient across the aortic valve. The right coronary artery is a large dominant vessel. 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 There is stent in the proximal segment of the circumflex with 30% in-stent restenosis, 20% more distal narrowing. The obtuse marginal branches are free of significant disease and they course to the lateral wall. The right coronary artery is free of significant disease. In the distal vessel, the midportion has a smooth 10-20% irregularity. The runoff vessel is free of significant disease. The proximal segment of the shear trunk of these two vessels has smooth 30% narrowing. The left main coronary is in normal anatomic position, feeds only the LAD as there is normal circumflex. There is 30% focal mid LAD lesion. The remainder of the LAD and diagonal branches are free of significant disease. CONCLUSION:  1.   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 2.  Mild diffuse coronary atherosclerotic heart disease. 3.  Preserved LV systolic function.       MD SANDY [id number]  D:  02/03/2021 17:08  T:  02/04/2021 0:58  JOB #:  [id number]

## 2021-02-04 NOTE — ROUTINE PROCESS
Verbal bedside report given to [facility], oncoming RN. Patient's situation, background, assessment and recommendations provided. Opportunity for questions provided. Oncoming RN assumed care of patient. R radial site site visualized.

## 2021-02-04 NOTE — DISCHARGE SUMMARY
[facility] Cardiology Discharge Summary     Patient ID:  Mary Bansal  [id number]  86 y.o.  1940    Admit date: 2/3/2021    Discharge date:  2/4/2021    Admitting Physician: Edna Stokes MD     Discharge Physician: Dr. Zahraa Flynn    Admission Diagnoses: NSVT (nonsustained ventricular tachycardia) ([facility] [I47.2]    Discharge Diagnoses:    Diagnosis    Coronary artery disease    Mixed hyperlipidemia    [facility]    Essential hypertension     Cardiology Procedures this admission:  Diagnostic left heart catheterization  Consults: EP    Hospital Course: Patient presented to the ER for complaints of acute onset of chest pain and palpitations last night. EKG in the ER showed wide complex tachycardia consistent with [facility]. She was started on amiodarone. Patient was admitted to telemetry for continued care. Potassium was replaced on admission. Patient underwent cardiac catheterization by Dr. Alexander Valentino. Patient was found to have mild CAD with normal LVEF. Patient tolerated the procedure well and was taken to the telemetry floor for recovery. Case was reviewed by EP who felt the rhythm was a tach w a L BBB/abberrancy. Pt was started on multaq and had no further tachycardia. HR briefly into the high 30's. The morning of discharge, patient was up feeling well without any complaints of chest pain or shortness of breath. Patient's right radial cath site was clean, dry and intact without hematoma or bruit. Patient's labs were WNL w K 3.3 replaced prior to discharge. She continued to have reflux and PPI will be increased, f/u with PCP. Pt remained in NSR without recurrent a tach. With bradycardia coreg dose was decreased. HOWEVER, pt's insurance was contacted and they would NOT cover multaq. Therefor pt will be discharged today to  14 day tele monitor, stop multaq, resume prior dose of Coreg. Pt will f/u after monitor w Dr Zachary Monzon.       Patient was seen and examined by Dr. Saucedo and determined stable and ready for discharge. Patient was instructed on the importance of medication compliance. Patient will continue home ASA, Plavix, BB and ARB.     The patient will  tele monitor today 2-4 at 2:00- [place] office then f/u after monitor w Dr Floyd 3:30 on March 9- [place] office.  Check BMP in one week.     Greater than 30 minutes was spent discharging patient due to difficulty with affording multaq and need to change d/c plan.  Plan discussed w pt and daughter- she is anxious as pt's twin is on amiodarone and she feels that pt has had problems for a week prior to admission but reassured that pt will have monitor on and they can call us anytime if pt develops recurrent CP, SOB, dizziness or palpitations.        DISPOSITION: The patient is being discharged home in stable condition on a low saturated fat, low cholesterol and low salt diet. The patient is instructed to advance activities as tolerated to the limit of fatigue or shortness of breath. The patient is instructed to avoid all heavy lifting for 3-5 days. The patient is instructed to watch the cath site for bleeding/oozing; if seen, the patient is instructed to apply firm pressure with a clean cloth and call [facility] Cardiology at [phone]. The patient is instructed to watch for signs of infection which include: increasing area of redness, fever/hot to touch or purulent drainage at the catheterization site. The patient is instructed not to soak in a bathtub for 7-10 days, but is cleared to shower. The patient is instructed to call the office or return to the ER for immediate evaluation for any shortness of breath or chest pain not relieved by NTG.        Discharge Exam: Patient has been seen by Dr. Saucedo: see his progress note for exam details.    Visit Vitals  BP (!) 148/51   Pulse 64   Temp 97.5 °F (36.4 °C)   Resp 20   Ht 5' 2\" (1.575 m)   Wt 68.1 kg (150 lb 1.6 oz)   SpO2 94%   BMI 27.45  kg/m²       Recent Results (from the past 24 hour(s))   TROPONIN-HIGH SENSITIVITY    Collection Time: 02/03/21  9:07 AM   Result Value Ref Range    Troponin-High Sensitivity 21.2 (H) 0 - 14 pg/mL   METABOLIC PANEL, BASIC    Collection Time: 02/04/21  4:30 AM   Result Value Ref Range    Sodium 143 136 - 145 mmol/L    Potassium 3.3 (L) 3.5 - 5.1 mmol/L    Chloride 111 (H) 98 - 107 mmol/L    CO2 26 21 - 32 mmol/L    Anion gap 6 (L) 7 - 16 mmol/L    Glucose 94 65 - 100 mg/dL    BUN 10 8 - 23 MG/DL    Creatinine 0.48 (L) 0.6 - 1.0 MG/DL    GFR est AA >60 >60 ml/min/1.73m2    GFR est non-AA >60 >60 ml/min/1.73m2    Calcium 8.6 8.3 - 10.4 MG/DL   CBC W/O DIFF    Collection Time: 02/04/21  4:30 AM   Result Value Ref Range    WBC 8.6 4.3 - 11.1 K/uL    RBC 4.21 4.05 - 5.2 M/uL    HGB 11.8 11.7 - 15.4 g/dL    HCT 36.2 35.8 - 46.3 %    MCV 86.0 79.6 - 97.8 FL    MCH 28.0 26.1 - 32.9 PG    MCHC 32.6 31.4 - 35.0 g/dL    RDW 13.8 11.9 - 14.6 %    PLATELET 639 961 - 359 K/uL    MPV 11.4 9.4 - 12.3 FL    ABSOLUTE NRBC 0.00 0.0 - 0.2 K/uL   LIPID PANEL    Collection Time: 02/04/21  4:30 AM   Result Value Ref Range    LIPID PROFILE          Cholesterol, total 131 <200 MG/DL    Triglyceride 113 35 - 150 MG/DL    HDL Cholesterol 46 40 - 60 MG/DL    LDL, calculated 62.4 <100 MG/DL    VLDL, calculated 22.6 6.0 - 23.0 MG/DL    CHOL/HDL Ratio 2.8           Patient Instructions:   Discharge Medication List as of 2/4/2021  9:20 AM      START taking these medications    Details   aspirin 81 mg chewable tablet Take 1 Tab by mouth daily. , OTC                CONTINUE these medications which have CHANGED    Details   omeprazole (PRILOSEC) 40 mg capsule TAKE ONE CAPSULE BY MOUTH ONCE DAILY, Print, Disp-60 Cap, R-0      carvediloL (COREG) 6.25 mg tablet Take 1 Tab by mouth two (2) times daily (with meals). , Print, Disp-180 Tab, R-3Needs appointment for further refills         CONTINUE these medications which have NOT CHANGED    Details   zolpidem (Ambien) 10 mg tablet Take 10 mg by mouth nightly as needed for Sleep., Historical Med      clopidogrel (PLAVIX) 75 mg tab TAKE ONE TABLET BY MOUTH ONCE DAILY, Normal, Disp-90 Tab, R-3      fluticasone (FLONASE) 50 mcg/actuation nasal spray 2 Sprays by Both Nostrils route daily. , Normal, Disp-1 Bottle, R-2      ezetimibe (ZETIA) 10 mg tablet Take 1 Tab by mouth daily. , Normal, Disp-30 Tab, R-5      losartan (COZAAR) 50 mg tablet Take 1 Tab by mouth daily. , Normal, Disp-30 Tab, R-5      SUMAtriptan (IMITREX) 100 mg tablet Take 1 Tab by mouth once as needed for Migraine for up to 1 dose. Indications: MIGRAINE, Print, Disp-12 Tab, R-1      calcitRIOL (ROCALTROL) 0.5 mcg capsule Take 1 Cap by mouth daily. , Normal, Disp-30 Cap, R-5      Bifidobacterium Infantis (ALIGN) 4 mg cap Take 1 Cap by mouth daily. , Sample, Disp-7 Cap, R-0           ATTENDING ADDENDUM:    Patient seen and examined by me. Agree with above note by physician extender. Key findings are:  No CP or ANDERSON, + GERD symptoms. Cath OK yesterday. Continue current meds, augment GI regimen, outpatient GI evaluation as needed. CV- RRR without murmur  Lungs- Clear bilaterally  Abd- soft, nontender, nondistended  Ext- no edema, right wrist CDI    Plan: As above.     Maxim Cassidy MD  [facility] Cardiology  Pager [phone]

## 2021-02-04 NOTE — DISCHARGE INSTRUCTIONS
Cardiac Catheterization/Angiography Discharge Instructions    *Check the puncture site frequently for swelling or bleeding. If you see any bleeding, lie down and apply pressure over the area with a clean town or washcloth. Notify your doctor for any redness, swelling, drainage or oozing from the puncture site. Notify your doctor for any fever or chills. *If the leg or arm with the puncture becomes cold, numb or painful, call [facility] Cardiology at [phone]    *Activity should be limited for the next 48 hours. Climb stairs as little as possible and avoid any stooping, bending or strenuous activity for 48 hours. No heavy lifting (anything over 10 pounds) for three days. *Do not drive for 48 hours. *You may resume your usual diet. Drink more fluids than usual.    *Have a responsible person drive you home and stay with you for at least 24 hours after your heart catheterization/angiography. *You may remove the bandage from your Right Arm in 24 hours. You may shower in 24 hours. No tub baths, hot tubs or swimming for one week. Do not place any lotions, creams, powders, ointments over the puncture site for one week. You may place a clean band-aid over the puncture site each day for 5 days. Change this daily. Supraventricular Tachycardia: Care Instructions  Your Care Instructions     Having supraventricular tachycardia (SVT) means that from time to time your heart beats abnormally fast. This fast rhythm is caused by changes in the electrical system of your heart. You may feel a fluttering in your chest (palpitations) and have a fast pulse. When your heart is beating fast, you may feel anxious and lightheaded, be short of breath, and feel discomfort in the chest.  Your doctor may prescribe medicines to help slow down your heartbeat. Your doctor may also suggest you try vagal maneuvers when having an episode of SVT.  These are things, like bearing down, that might help slow your heart rate. Bearing down means that you try to breathe out with your stomach muscles but you don't let air out of your nose or mouth. Your doctor can show you how to do vagal maneuvers. He or she may suggest you lie down on your back to do them. In some cases, either cardioversion treatment or a procedure called catheter ablation is done to correct SVT. Your doctor may ask you to wear a small electronic device for 1 or 2 days to monitor your heart. It is called a Holter monitor. Follow-up care is a key part of your treatment and safety. Be sure to make and go to all appointments, and call your doctor if you are having problems. It's also a good idea to know your test results and keep a list of the medicines you take. How can you care for yourself at home? · Be safe with medicines. Take your medicines exactly as prescribed. Call your doctor if you think you are having a problem with your medicine. You will get more details on the specific medicines your doctor prescribes. · If your doctor showed you how to do vagal maneuvers, try them when you have an episode. These maneuvers include bearing down or putting an ice-cold, wet towel on your face. · Monitor your condition by keeping a diary of your SVT episodes. Bring this to your doctor appointments. ? Write down how fast or slow your heart was beating. To count your heart rate:  § Gently place 2 fingers of your hand on the inside of your other wrist, below your thumb. § Count the beats for 30 seconds. § Then, double the result to get the number of beats per minute. ? Write down if your heart rhythm was regular or irregular. ? Write down the symptoms you had.  ? Write down the time of day your symptoms occurred. ? Write down how long your symptoms lasted. ? Write down what you were doing when your symptoms started. ? Write down what may have helped your symptoms go away. · If they trigger episodes, limit or avoid alcohol or drinks with caffeine.   · Do not use over-the-counter decongestants, herbal remedies, diet pills, or \"pep\" pills, which often contain stimulants. · Do not use illegal drugs, such as cocaine, ecstasy, or methamphetamine, which can speed up your heart's rhythm. · Do not smoke. Smoking can make this condition worse. If you need help quitting, talk to your doctor about stop-smoking programs and medicines. These can increase your chances of quitting for good. · Be alert for new or worsening symptoms, such as shortness of breath, pounding of your heart, or unusual tiredness. If new symptoms develop or your symptoms become worse, call your doctor. When should you call for help? Call 911 anytime you think you may need emergency care. For example, call if:    · You passed out (lost consciousness).     · You are short of breath. Call your doctor now or seek immediate medical care if:    · You have a fast heartbeat.     · You are dizzy or lightheaded, or feel like you may faint. Watch closely for changes in your health, and be sure to contact your doctor if:    · You do not get better as expected. Where can you learn more? Go to http://www.gray.com/  Enter G244 in the search box to learn more about \"Supraventricular Tachycardia: Care Instructions. \"  Current as of: December 16, 2019               Content Version: 12.6  © 2573-6885 [employer]. Care instructions adapted under license by [employer] (which disclaims liability or warranty for this information). If you have questions about a medical condition or this instruction, always ask your healthcare professional. [place] [address] any warranty or liability for your use of this information. Patient Education   Carvedilol (By mouth)   Carvedilol ([id number]-[id number]-wop-ol)  Treats high blood pressure and heart failure. Also reduces the risk of death after a heart attack. This medicine is a beta-blocker.    Brand Name(s): Coreg, Coreg CR There may be other brand names for this medicine. When This Medicine Should Not Be Used: This medicine is not right for everyone. Do not use it if you had an allergic reaction to carvedilol, or if you have asthma, severe liver disease, or certain heart problems. Ask your doctor about these heart problems. How to Use This Medicine:   Long Acting Capsule, Tablet  · Take your medicine as directed. Your dose may need to be changed several times to find what works best for you. · It is best to take this medicine with food or milk. · Extended-release capsule instructions:   ¨ Take the capsule in the morning with food. ¨ Swallow the capsule whole. Do not crush or chew it. ¨ If you cannot swallow the capsule, you may open it and sprinkle the medicine over a spoonful of applesauce. Swallow the applesauce right away. · Read and follow the patient instructions that come with this medicine. Talk to your doctor or pharmacist if you have any questions. · Store the medicine in a closed container at room temperature, away from heat, moisture, and direct light. · Missed dose: Take a dose as soon as you remember. If it is almost time for your next dose, wait until then and take a regular dose. Do not take extra medicine to make up for a missed dose. Drugs and Foods to Avoid:   Ask your doctor or pharmacist before using any other medicine, including over-the-counter medicines, vitamins, and herbal products. · Some medicines can affect how carvedilol works. Tell your doctor if you are using amiodarone, clonidine, diltiazem, cyclosporine, digoxin, fluconazole, reserpine, rifampin, verapamil, or an MAO inhibitor (MAOI).   Warnings While Using This Medicine:   · Tell your doctor if you are pregnant or breastfeeding, or if you have kidney disease, liver disease, bradycardia (slow heartbeat), coronary artery disease, circulation problems, edema (fluid retention or swelling), heart or blood vessel problems, low blood pressure, lung problems (such as bronchitis or emphysema), an overactive thyroid, pheochromocytoma, or frequent chest pains. Tell your doctor if you have a history of severe allergic reactions or if you are scheduled to have surgery. · This medicine may cause the following problems:   ¨ Changes to your blood sugar level (if you have diabetes, report any blood sugar level changes to your doctor)  ¨ Fewer tears than usual in contact lens wearers  ¨ An eye problem called Intraoperative Floppy Iris Syndrome during cataract surgery  · This medicine may make you dizzy or drowsy. Do not drive, use machines, or do anything else that could be dangerous if you are not alert. · Do not stop using this medicine suddenly. Your doctor will need to slowly decrease your dose before you stop it completely. · Keep all medicine out of the reach of children. Never share your medicine with anyone. Possible Side Effects While Using This Medicine:   Call your doctor right away if you notice any of these side effects:  · Allergic reaction: Itching or hives, swelling in your face or hands, swelling or tingling in your mouth or throat, chest tightness, trouble breathing  · Change in how much or how often you urinate  · Chest pain that may spread to your arms, jaw, back, or neck, trouble breathing, nausea, unusual sweating, faintness  · Leg pain when you walk, legs and feet that feel cold or numb  · Lightheadedness, dizziness, or fainting  · Rapid weight gain, swelling in your hands, ankles, or feet  · Shaking, trembling, sweating, hunger, confusion  · Slow, fast, or uneven heartbeat  · Unusual bleeding or bruising  · Wheezing or trouble breathing  If you notice these less serious side effects, talk with your doctor:   · Diarrhea  · Trouble having sex  · Unusual tiredness or weakness  If you notice other side effects that you think are caused by this medicine, tell your doctor. Call your doctor for medical advice about side effects.  You may report side effects to FDA at 4-048-FDA-8839  © 2017 [facility] Information is for End User's use only and may not be sold, redistributed or otherwise used for commercial purposes. The above information is an  only. It is not intended as medical advice for individual conditions or treatments. Talk to your doctor, nurse or pharmacist before following any medical regimen to see if it is safe and effective for you.

## 2021-02-04 NOTE — ROUTINE PROCESS
Verbal bedside report received from [facility], RN. Assumed care of patient. R radial verified at bedside with outgoing RN.

## 2021-02-04 NOTE — ROUTINE PROCESS
Bedside and Verbal shift change report given to self (oncoming nurse) by Kusum Beasley (offgoing nurse). Report included the following information SBAR, Kardex, ED Summary, Procedure Summary, Intake/Output, MAR, Recent Results and Cardiac Rhythm NSR . R Radial Site Visualized no changes

## 2021-02-04 NOTE — ROUTINE PROCESS
Bedside and Verbal shift change report given to Dary Baumann RN  (oncoming nurse) by Marquis Patino  (offgoing nurse). Report included the following information SBAR, Procedure Summary, MAR and Cardiac Rhythm SB.  
 
R Radial site visualized no issues

## 2021-02-05 ENCOUNTER — APPOINTMENT (OUTPATIENT)
Dept: GENERAL RADIOLOGY | Age: 81
DRG: 310 | End: 2021-02-05
Attending: EMERGENCY MEDICINE
Payer: MEDICARE

## 2021-02-05 ENCOUNTER — HOSPITAL ENCOUNTER (INPATIENT)
Age: 81
LOS: 1 days | Discharge: HOME | DRG: 310 | End: 2021-02-08
Attending: EMERGENCY MEDICINE | Admitting: INTERNAL MEDICINE
Payer: MEDICARE

## 2021-02-05 DIAGNOSIS — R00.0 WIDE-COMPLEX TACHYCARDIA: Primary | ICD-10-CM

## 2021-02-05 DIAGNOSIS — I47.1 ATRIAL TACHYCARDIA (HCC): ICD-10-CM

## 2021-02-05 DIAGNOSIS — I25.110 CORONARY ARTERY DISEASE WITH UNSTABLE ANGINA PECTORIS, UNSPECIFIED VESSEL OR LESION TYPE, UNSPECIFIED WHETHER NATIVE OR TRANSPLANTED HEART (HCC): ICD-10-CM

## 2021-02-05 DIAGNOSIS — I10 ESSENTIAL HYPERTENSION: ICD-10-CM

## 2021-02-05 PROBLEM — I47.29 NSVT (NONSUSTAINED VENTRICULAR TACHYCARDIA): Status: RESOLVED | Noted: 2021-02-03 | Resolved: 2021-02-05

## 2021-02-05 LAB
ALBUMIN SERPL-MCNC: 4.3 G/DL (ref 3.2–4.6)
ALBUMIN/GLOB SERPL: 1.1 {RATIO} (ref 1.2–3.5)
ALP SERPL-CCNC: 126 U/L (ref 50–136)
ALT SERPL-CCNC: 23 U/L (ref 12–65)
ANION GAP SERPL CALC-SCNC: 9 MMOL/L (ref 7–16)
AST SERPL-CCNC: 30 U/L (ref 15–37)
ATRIAL RATE: 70 BPM
BASOPHILS # BLD: 0.1 K/UL (ref 0–0.2)
BASOPHILS NFR BLD: 1 % (ref 0–2)
BILIRUB SERPL-MCNC: 0.9 MG/DL (ref 0.2–1.1)
BUN SERPL-MCNC: 9 MG/DL (ref 8–23)
CALCIUM SERPL-MCNC: 9.1 MG/DL (ref 8.3–10.4)
CALCULATED P AXIS, ECG09: 103 DEGREES
CALCULATED R AXIS, ECG10: 68 DEGREES
CALCULATED T AXIS, ECG11: 100 DEGREES
CHLORIDE SERPL-SCNC: 107 MMOL/L (ref 98–107)
CO2 SERPL-SCNC: 24 MMOL/L (ref 21–32)
CREAT SERPL-MCNC: 0.71 MG/DL (ref 0.6–1)
DIAGNOSIS, 93000: NORMAL
DIFFERENTIAL METHOD BLD: NORMAL
EOSINOPHIL # BLD: 0.3 K/UL (ref 0–0.8)
EOSINOPHIL NFR BLD: 3 % (ref 0.5–7.8)
ERYTHROCYTE [DISTWIDTH] IN BLOOD BY AUTOMATED COUNT: 13.8 % (ref 11.9–14.6)
GLOBULIN SER CALC-MCNC: 3.9 G/DL (ref 2.3–3.5)
GLUCOSE SERPL-MCNC: 106 MG/DL (ref 65–100)
HCT VFR BLD AUTO: 43.1 % (ref 35.8–46.3)
HGB BLD-MCNC: 13.7 G/DL (ref 11.7–15.4)
IMM GRANULOCYTES # BLD AUTO: 0 K/UL (ref 0–0.5)
IMM GRANULOCYTES NFR BLD AUTO: 0 % (ref 0–5)
LYMPHOCYTES # BLD: 3.1 K/UL (ref 0.5–4.6)
LYMPHOCYTES NFR BLD: 37 % (ref 13–44)
MAGNESIUM SERPL-MCNC: 2.1 MG/DL (ref 1.8–2.4)
MCH RBC QN AUTO: 27.2 PG (ref 26.1–32.9)
MCHC RBC AUTO-ENTMCNC: 31.8 G/DL (ref 31.4–35)
MCV RBC AUTO: 85.7 FL (ref 79.6–97.8)
MONOCYTES # BLD: 0.7 K/UL (ref 0.1–1.3)
MONOCYTES NFR BLD: 8 % (ref 4–12)
NEUTS SEG # BLD: 4.3 K/UL (ref 1.7–8.2)
NEUTS SEG NFR BLD: 51 % (ref 43–78)
NRBC # BLD: 0 K/UL (ref 0–0.2)
P-R INTERVAL, ECG05: 176 MS
PLATELET # BLD AUTO: 241 K/UL (ref 150–450)
PMV BLD AUTO: 11.4 FL (ref 9.4–12.3)
POTASSIUM SERPL-SCNC: 3.3 MMOL/L (ref 3.5–5.1)
PROT SERPL-MCNC: 8.2 G/DL (ref 6.3–8.2)
Q-T INTERVAL, ECG07: 484 MS
QRS DURATION, ECG06: 154 MS
QTC CALCULATION (BEZET), ECG08: 522 MS
RBC # BLD AUTO: 5.03 M/UL (ref 4.05–5.2)
SODIUM SERPL-SCNC: 140 MMOL/L (ref 136–145)
TROPONIN-HIGH SENSITIVITY: 43.5 PG/ML (ref 0–14)
TSH SERPL DL<=0.005 MIU/L-ACNC: 9.29 UIU/ML (ref 0.36–3.74)
VENTRICULAR RATE, ECG03: 70 BPM
WBC # BLD AUTO: 8.5 K/UL (ref 4.3–11.1)

## 2021-02-05 PROCEDURE — 99284 EMERGENCY DEPT VISIT MOD MDM: CPT

## 2021-02-05 PROCEDURE — 77010033678 HC OXYGEN DAILY

## 2021-02-05 PROCEDURE — 96375 TX/PRO/DX INJ NEW DRUG ADDON: CPT

## 2021-02-05 PROCEDURE — 96365 THER/PROPH/DIAG IV INF INIT: CPT

## 2021-02-05 PROCEDURE — 99220 PR INITIAL OBSERVATION CARE/DAY 70 MINUTES: CPT | Performed by: INTERNAL MEDICINE

## 2021-02-05 PROCEDURE — 94760 N-INVAS EAR/PLS OXIMETRY 1: CPT

## 2021-02-05 PROCEDURE — 74011250637 HC RX REV CODE- 250/637: Performed by: INTERNAL MEDICINE

## 2021-02-05 PROCEDURE — 74011000258 HC RX REV CODE- 258: Performed by: EMERGENCY MEDICINE

## 2021-02-05 PROCEDURE — 74011000250 HC RX REV CODE- 250: Performed by: NURSE PRACTITIONER

## 2021-02-05 PROCEDURE — 74011000258 HC RX REV CODE- 258: Performed by: NURSE PRACTITIONER

## 2021-02-05 PROCEDURE — 84443 ASSAY THYROID STIM HORMONE: CPT

## 2021-02-05 PROCEDURE — 96374 THER/PROPH/DIAG INJ IV PUSH: CPT

## 2021-02-05 PROCEDURE — 85025 COMPLETE CBC W/AUTO DIFF WBC: CPT

## 2021-02-05 PROCEDURE — 99218 HC RM OBSERVATION: CPT

## 2021-02-05 PROCEDURE — 74011250636 HC RX REV CODE- 250/636: Performed by: NURSE PRACTITIONER

## 2021-02-05 PROCEDURE — 2709999900 HC NON-CHARGEABLE SUPPLY

## 2021-02-05 PROCEDURE — 96366 THER/PROPH/DIAG IV INF ADDON: CPT

## 2021-02-05 PROCEDURE — 80053 COMPREHEN METABOLIC PANEL: CPT

## 2021-02-05 PROCEDURE — 51702 INSERT TEMP BLADDER CATH: CPT

## 2021-02-05 PROCEDURE — 84484 ASSAY OF TROPONIN QUANT: CPT

## 2021-02-05 PROCEDURE — 74011250637 HC RX REV CODE- 250/637: Performed by: NURSE PRACTITIONER

## 2021-02-05 PROCEDURE — 74011250636 HC RX REV CODE- 250/636: Performed by: EMERGENCY MEDICINE

## 2021-02-05 PROCEDURE — 83735 ASSAY OF MAGNESIUM: CPT

## 2021-02-05 PROCEDURE — 96376 TX/PRO/DX INJ SAME DRUG ADON: CPT

## 2021-02-05 PROCEDURE — 71045 X-RAY EXAM CHEST 1 VIEW: CPT

## 2021-02-05 RX ORDER — CARVEDILOL 6.25 MG/1
6.25 TABLET ORAL 2 TIMES DAILY WITH MEALS
Status: DISCONTINUED | OUTPATIENT
Start: 2021-02-05 | End: 2021-02-08 | Stop reason: HOSPADM

## 2021-02-05 RX ORDER — GUAIFENESIN 100 MG/5ML
81 LIQUID (ML) ORAL DAILY
Status: DISCONTINUED | OUTPATIENT
Start: 2021-02-05 | End: 2021-02-05

## 2021-02-05 RX ORDER — GUAIFENESIN 100 MG/5ML
81 LIQUID (ML) ORAL DAILY
Status: DISCONTINUED | OUTPATIENT
Start: 2021-02-05 | End: 2021-02-08 | Stop reason: HOSPADM

## 2021-02-05 RX ORDER — PANTOPRAZOLE SODIUM 40 MG/1
40 TABLET, DELAYED RELEASE ORAL
Status: DISCONTINUED | OUTPATIENT
Start: 2021-02-05 | End: 2021-02-08 | Stop reason: HOSPADM

## 2021-02-05 RX ORDER — AMIODARONE HYDROCHLORIDE 150 MG/3ML
INJECTION, SOLUTION INTRAVENOUS
Status: DISCONTINUED
Start: 2021-02-05 | End: 2021-02-05

## 2021-02-05 RX ORDER — AMIODARONE HYDROCHLORIDE 150 MG/3ML
INJECTION, SOLUTION INTRAVENOUS
Status: COMPLETED | OUTPATIENT
Start: 2021-02-05 | End: 2021-02-05

## 2021-02-05 RX ORDER — AMIODARONE HYDROCHLORIDE 200 MG/1
200 TABLET ORAL EVERY 12 HOURS
Status: DISCONTINUED | OUTPATIENT
Start: 2021-02-05 | End: 2021-02-06

## 2021-02-05 RX ORDER — METOPROLOL TARTRATE 5 MG/5ML
INJECTION INTRAVENOUS
Status: DISCONTINUED
Start: 2021-02-05 | End: 2021-02-05

## 2021-02-05 RX ORDER — MAG HYDROX/ALUMINUM HYD/SIMETH 200-200-20
30 SUSPENSION, ORAL (FINAL DOSE FORM) ORAL
Status: DISCONTINUED | OUTPATIENT
Start: 2021-02-05 | End: 2021-02-08 | Stop reason: HOSPADM

## 2021-02-05 RX ORDER — SODIUM CHLORIDE 0.9 % (FLUSH) 0.9 %
5-40 SYRINGE (ML) INJECTION AS NEEDED
Status: DISCONTINUED | OUTPATIENT
Start: 2021-02-05 | End: 2021-02-08 | Stop reason: HOSPADM

## 2021-02-05 RX ORDER — ONDANSETRON 2 MG/ML
INJECTION INTRAMUSCULAR; INTRAVENOUS
Status: COMPLETED | OUTPATIENT
Start: 2021-02-05 | End: 2021-02-05

## 2021-02-05 RX ORDER — LOSARTAN POTASSIUM 50 MG/1
50 TABLET ORAL DAILY
Status: DISCONTINUED | OUTPATIENT
Start: 2021-02-05 | End: 2021-02-07

## 2021-02-05 RX ORDER — EZETIMIBE 10 MG/1
10 TABLET ORAL DAILY
Status: DISCONTINUED | OUTPATIENT
Start: 2021-02-05 | End: 2021-02-08 | Stop reason: HOSPADM

## 2021-02-05 RX ORDER — MORPHINE SULFATE 2 MG/ML
2 INJECTION, SOLUTION INTRAMUSCULAR; INTRAVENOUS
Status: DISCONTINUED | OUTPATIENT
Start: 2021-02-05 | End: 2021-02-08 | Stop reason: HOSPADM

## 2021-02-05 RX ORDER — ONDANSETRON 2 MG/ML
INJECTION INTRAMUSCULAR; INTRAVENOUS
Status: DISCONTINUED
Start: 2021-02-05 | End: 2021-02-05

## 2021-02-05 RX ORDER — ACETAMINOPHEN 325 MG/1
650 TABLET ORAL
Status: DISCONTINUED | OUTPATIENT
Start: 2021-02-05 | End: 2021-02-08 | Stop reason: HOSPADM

## 2021-02-05 RX ORDER — ZOLPIDEM TARTRATE 5 MG/1
10 TABLET ORAL
Status: DISCONTINUED | OUTPATIENT
Start: 2021-02-05 | End: 2021-02-08 | Stop reason: HOSPADM

## 2021-02-05 RX ORDER — HYDROCODONE BITARTRATE AND ACETAMINOPHEN 5; 325 MG/1; MG/1
1 TABLET ORAL
Status: DISCONTINUED | OUTPATIENT
Start: 2021-02-05 | End: 2021-02-08 | Stop reason: HOSPADM

## 2021-02-05 RX ORDER — ONDANSETRON 2 MG/ML
4 INJECTION INTRAMUSCULAR; INTRAVENOUS
Status: DISCONTINUED | OUTPATIENT
Start: 2021-02-05 | End: 2021-02-08 | Stop reason: HOSPADM

## 2021-02-05 RX ORDER — CLOPIDOGREL BISULFATE 75 MG/1
75 TABLET ORAL DAILY
Status: DISCONTINUED | OUTPATIENT
Start: 2021-02-05 | End: 2021-02-08 | Stop reason: HOSPADM

## 2021-02-05 RX ORDER — POTASSIUM CHLORIDE 20 MEQ/1
40 TABLET, EXTENDED RELEASE ORAL
Status: COMPLETED | OUTPATIENT
Start: 2021-02-05 | End: 2021-02-05

## 2021-02-05 RX ORDER — NITROGLYCERIN 0.4 MG/1
0.4 TABLET SUBLINGUAL
Status: DISCONTINUED | OUTPATIENT
Start: 2021-02-05 | End: 2021-02-08 | Stop reason: HOSPADM

## 2021-02-05 RX ORDER — SODIUM CHLORIDE 0.9 % (FLUSH) 0.9 %
5-40 SYRINGE (ML) INJECTION EVERY 8 HOURS
Status: DISCONTINUED | OUTPATIENT
Start: 2021-02-05 | End: 2021-02-08 | Stop reason: HOSPADM

## 2021-02-05 RX ORDER — METOPROLOL TARTRATE 5 MG/5ML
INJECTION INTRAVENOUS
Status: COMPLETED | OUTPATIENT
Start: 2021-02-05 | End: 2021-02-05

## 2021-02-05 RX ADMIN — ALUMINUM HYDROXIDE, MAGNESIUM HYDROXIDE, AND SIMETHICONE 30 ML: 200; 200; 20 SUSPENSION ORAL at 20:37

## 2021-02-05 RX ADMIN — Medication 10 ML: at 05:27

## 2021-02-05 RX ADMIN — ONDANSETRON 4 MG: 2 INJECTION INTRAMUSCULAR; INTRAVENOUS at 03:51

## 2021-02-05 RX ADMIN — CARVEDILOL 6.25 MG: 6.25 TABLET, FILM COATED ORAL at 08:40

## 2021-02-05 RX ADMIN — AMIODARONE HYDROCHLORIDE 150 MG: 50 INJECTION, SOLUTION INTRAVENOUS at 03:35

## 2021-02-05 RX ADMIN — LOSARTAN POTASSIUM 50 MG: 50 TABLET, FILM COATED ORAL at 08:40

## 2021-02-05 RX ADMIN — POTASSIUM CHLORIDE 40 MEQ: 20 TABLET, EXTENDED RELEASE ORAL at 05:22

## 2021-02-05 RX ADMIN — CARVEDILOL 6.25 MG: 6.25 TABLET, FILM COATED ORAL at 16:19

## 2021-02-05 RX ADMIN — CLOPIDOGREL BISULFATE 75 MG: 75 TABLET ORAL at 08:40

## 2021-02-05 RX ADMIN — METOPROLOL TARTRATE 2.5 MG: 5 INJECTION, SOLUTION INTRAVENOUS at 03:47

## 2021-02-05 RX ADMIN — SODIUM CHLORIDE 1000 ML: 900 INJECTION, SOLUTION INTRAVENOUS at 03:35

## 2021-02-05 RX ADMIN — AMIODARONE HYDROCHLORIDE 200 MG: 200 TABLET ORAL at 13:59

## 2021-02-05 RX ADMIN — SODIUM CHLORIDE 1000 ML: 900 INJECTION, SOLUTION INTRAVENOUS at 03:39

## 2021-02-05 RX ADMIN — AMIODARONE HYDROCHLORIDE 0.5 MG/MIN: 50 INJECTION, SOLUTION INTRAVENOUS at 12:28

## 2021-02-05 RX ADMIN — ASPIRIN 81 MG: 81 TABLET, CHEWABLE ORAL at 08:40

## 2021-02-05 RX ADMIN — AMIODARONE HYDROCHLORIDE 150 MG: 50 INJECTION, SOLUTION INTRAVENOUS at 03:42

## 2021-02-05 RX ADMIN — NITROGLYCERIN 1 INCH: 20 OINTMENT TOPICAL at 05:21

## 2021-02-05 RX ADMIN — EZETIMIBE 10 MG: 10 TABLET ORAL at 08:40

## 2021-02-05 RX ADMIN — AMIODARONE HYDROCHLORIDE 1 MG/MIN: 50 INJECTION, SOLUTION INTRAVENOUS at 04:27

## 2021-02-05 RX ADMIN — AMIODARONE HYDROCHLORIDE 200 MG: 200 TABLET ORAL at 20:39

## 2021-02-05 RX ADMIN — ACETAMINOPHEN 650 MG: 325 TABLET ORAL at 08:39

## 2021-02-05 RX ADMIN — PANTOPRAZOLE SODIUM 40 MG: 40 TABLET, DELAYED RELEASE ORAL at 05:22

## 2021-02-05 RX ADMIN — AMIODARONE HYDROCHLORIDE 150 MG: 50 INJECTION, SOLUTION INTRAVENOUS at 03:47

## 2021-02-05 RX ADMIN — Medication 10 ML: at 20:40

## 2021-02-05 RX ADMIN — ZOLPIDEM TARTRATE 10 MG: 5 TABLET ORAL at 20:37

## 2021-02-05 NOTE — PROGRESS NOTES
Pt presented to ED reporting rapid heart rate, \"burning\" to chest, lightheadedness, nausea and shortness of breath. Pt was discharged on 2/4/21; demographics have not changed. Pt is in [address]. No d/c needs at this time but will continue to monitor. Care Management Interventions  PCP Verified by CM: Yes(Dr. Katharina Daniel MD)  Mode of Transport at Discharge:  Other (see comment)(Family)  Transition of Care Consult (CM Consult): Discharge Planning  Current Support Network: Own Home, Family Lives Nearby  Confirm Follow Up Transport: Family  The Plan for Transition of Care is Related to the Following Treatment Goals : Return to baseline  Discharge Location  Discharge Placement: Home

## 2021-02-05 NOTE — ROUTINE PROCESS
Bedside and Verbal shift change report given to self (oncoming nurse) by Christophe Small (offgoing nurse). Report included the following information SBAR, Kardex, Intake/Output and MAR.

## 2021-02-05 NOTE — H&P
[facility] Cardiology History & Physical      Date of  Admission: 2/5/2021  3:14 AM     Primary Care Physician: Dr. Snehal Deleon  Primary Cardiologist: Dr. Nadine Casarez  Admitting Physician: Dr. Ron Barr    CC: palpitations    HPI:  Zeinab Contreras is a 68 y.o. female with past medical history of CAD, GERD, HTN and HLD who presented to the ER with recurrent tachycardia. Patient was admitted on 2/3 with tachycardia, shortness of breath, and chest pain. EKG that admission was concerning for NSVT. 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 EP reviewed the EKG/rhythm strips with recommendations to start patient on Multaq for treatment of atrial tachycardia, however, patient's insurance would not cover this. Therefore, patient was discharged home on Coreg (dose lowered at discharge due to some bradycardia with rate as low as 30s) and scheduled for outpatient monitoring. Tonight, patient reports that around 2am she woke up with palpitations, chest pressure, shortness of breath and dizziness. Upon arrival to the ER, patient was in SR with rate in the 80s. Shortly after arrival, patient noted to be tachycardic with rates as high as 190. She was given 150mg IV amiodarone x 2 and 2.5 IV lopressor and is currently in SR with rates in the 70s.       Past Medical History:   Diagnosis Date    Arrhythmia     FAST HEART BEAT    Arthritis     OSTEO    Bell's palsy 12/7/2015    Bell's palsy     Cancer ([facility] [address]     melona    Cataract  NOS 12/7/2015    Coronary artery disease 6/23/2009    GERD (gastroesophageal reflux disease)     ACID REFLUX    Hx of varicose vein ligation and stripping 5/16/2016    LEFT 30+ yrs ago    Hyperlipidemia 12/7/2015    Hypertension     Macular degeneration (senile), unspecified 12/7/2015    Nerve damage     from shingles    Osteoarthrosis involving multiple sites but not generalized 12/7/2015    Osteoporosis   NOS 12/7/2015    Varicose vein of leg       Past Surgical History:   Procedure Laterality Date    HX APPENDECTOMY      HX HEART CATHETERIZATION      with 1 stent    HX HYSTERECTOMY      HX OTHER SURGICAL      BREAST IMPLANTS, BLADDER TACT       Allergies   Allergen Reactions    Penicillins Rash    Sulfa (Sulfonamide Antibiotics) Rash      Social History     Socioeconomic History    Marital status:      Spouse name: Not on file    Number of children: Not on file    Years of education: Not on file    Highest education level: Not on file   Occupational History    Not on file   Social Needs    Financial resource strain: Not on file    Food insecurity     Worry: Not on file     Inability: Not on file    Transportation needs     Medical: Not on file     Non-medical: Not on file   Tobacco Use    Smoking status: Never Smoker    Smokeless tobacco: Never Used   Substance and Sexual Activity    Alcohol use:  Yes     Alcohol/week: 17.5 standard drinks     Types: 7 Glasses of wine, 14 Cans of beer per week    Drug use: No    Sexual activity: Not Currently     Birth control/protection: Surgical   Lifestyle    Physical activity     Days per week: Not on file     Minutes per session: Not on file    Stress: Not on file   Relationships    Social connections     Talks on phone: Not on file     Gets together: Not on file     Attends Adventism service: Not on file     Active member of club or organization: Not on file     Attends meetings of clubs or organizations: Not on file     Relationship status: Not on file    Intimate partner violence     Fear of current or ex partner: Not on file     Emotionally abused: Not on file     Physically abused: Not on file     Forced sexual activity: Not on file   Other Topics Concern     Service No    Blood Transfusions No    Caffeine Concern No    Occupational Exposure No    Hobby Hazards No    Sleep Concern No    Stress Concern No    Weight Concern No    Special Diet No    Back Care No    Exercise Yes    Bike Helmet No    Seat Belt Yes    Self-Exams No   Social History Narrative    Not on file     Family History   Problem Relation Age of Onset    Heart Attack Father     Heart Attack Sister    [facility] Cancer Sister         one sister has ovarian cancer    Diabetes Mother     Other Mother         vv    Diabetes Maternal Grandmother     Other Maternal Grandmother         vv    Heart Disease Paternal Grandmother     Heart Attack Paternal Uncle         Current Facility-Administered Medications   Medication Dose Route Frequency    sodium chloride 0.9 % bolus infusion 1,000 mL  1,000 mL IntraVENous ONCE    amiodarone (CORDARONE) injection   IntraVENous CODE BLUE    sodium chloride 0.9 % bolus infusion   IntraVENous CODE BLUE INFUSION    amiodarone (CORDARONE) 450 mg in dextrose 5% 250 mL infusion  0.5-1 mg/min IntraVENous TITRATE    metoprolol (LOPRESSOR) injection   IntraVENous CODE BLUE    ondansetron (ZOFRAN) injection   IntraVENous CODE BLUE     Current Outpatient Medications   Medication Sig    aspirin 81 mg chewable tablet Take 1 Tab by mouth daily.  omeprazole (PRILOSEC) 40 mg capsule TAKE ONE CAPSULE BY MOUTH ONCE DAILY    carvediloL (COREG) 6.25 mg tablet Take 1 Tab by mouth two (2) times daily (with meals).  clopidogreL (PLAVIX) 75 mg tab TAKE ONE TABLET BY MOUTH ONCE DAILY    ezetimibe (ZETIA) 10 mg tablet Take 1 Tab by mouth daily.  losartan (COZAAR) 50 mg tablet Take 1 Tab by mouth daily.  zolpidem (Ambien) 10 mg tablet Take 10 mg by mouth nightly as needed for Sleep.  fluticasone (FLONASE) 50 mcg/actuation nasal spray 2 Sprays by Both Nostrils route daily.  SUMAtriptan (IMITREX) 100 mg tablet Take 1 Tab by mouth once as needed for Migraine for up to 1 dose. Indications: MIGRAINE    calcitRIOL (ROCALTROL) 0.5 mcg capsule Take 1 Cap by mouth daily.  Bifidobacterium Infantis (ALIGN) 4 mg cap Take 1 Cap by mouth daily. Review of Systems    Review of Systems   Constitutional: Negative. HENT: Negative. Eyes: Negative. Respiratory: Positive for shortness of breath. Cardiovascular: Positive for chest pain and palpitations. Gastrointestinal: Positive for nausea. Genitourinary: Negative. Musculoskeletal: Negative. Skin: Negative. Neurological: Positive for dizziness. Endo/Heme/Allergies: Negative. Psychiatric/Behavioral: Negative. Subjective:     Visit Vitals  BP (!) 207/103 (BP 1 Location: Left upper arm, BP Patient Position: At rest)   Pulse 83   Temp 97.7 °F (36.5 °C)   Resp 16   Ht 5' 2\" (1.575 m)   Wt 68.9 kg (152 lb)   SpO2 96%   BMI 27.80 kg/m²     Physical Exam  HENT:      Mouth/Throat:      Mouth: Mucous membranes are moist.   Cardiovascular:      Rate and Rhythm: Regular rhythm. Tachycardia present. Heart sounds: Normal heart sounds. Pulmonary:      Breath sounds: Normal breath sounds. Abdominal:      General: Bowel sounds are normal.   Musculoskeletal:         General: No swelling. Skin:     General: Skin is warm and dry. Neurological:      Mental Status: She is alert and oriented to person, place, and time. Psychiatric:         Mood and Affect: Mood normal.         Cardiographics  Telemetry: ATACH to NSR  ECG: ATACH with abberancy to NSR  Echocardiogram: preserved EF by [facility].  Ordered/pending    Labs:   Recent Results (from the past 24 hour(s))   METABOLIC PANEL, BASIC    Collection Time: 02/04/21  4:30 AM   Result Value Ref Range    Sodium 143 136 - 145 mmol/L    Potassium 3.3 (L) 3.5 - 5.1 mmol/L    Chloride 111 (H) 98 - 107 mmol/L    CO2 26 21 - 32 mmol/L    Anion gap 6 (L) 7 - 16 mmol/L    Glucose 94 65 - 100 mg/dL    BUN 10 8 - 23 MG/DL    Creatinine 0.48 (L) 0.6 - 1.0 MG/DL    GFR est AA >60 >60 ml/min/1.73m2    GFR est non-AA >60 >60 ml/min/1.73m2    Calcium 8.6 8.3 - 10.4 MG/DL   CBC W/O DIFF    Collection Time: 02/04/21  4:30 AM   Result Value Ref Range WBC 8.6 4.3 - 11.1 K/uL    RBC 4.21 4.05 - 5.2 M/uL    HGB 11.8 11.7 - 15.4 g/dL    HCT 36.2 35.8 - 46.3 %    MCV 86.0 79.6 - 97.8 FL    MCH 28.0 26.1 - 32.9 PG    MCHC 32.6 31.4 - 35.0 g/dL    RDW 13.8 11.9 - 14.6 %    PLATELET 920 398 - 030 K/uL    MPV 11.4 9.4 - 12.3 FL    ABSOLUTE NRBC 0.00 0.0 - 0.2 K/uL   LIPID PANEL    Collection Time: 02/04/21  4:30 AM   Result Value Ref Range    LIPID PROFILE          Cholesterol, total 131 <200 MG/DL    Triglyceride 113 35 - 150 MG/DL    HDL Cholesterol 46 40 - 60 MG/DL    LDL, calculated 62.4 <100 MG/DL    VLDL, calculated 22.6 6.0 - 23.0 MG/DL    CHOL/HDL Ratio 2.8     CBC WITH AUTOMATED DIFF    Collection Time: 02/05/21  3:13 AM   Result Value Ref Range    WBC 8.5 4.3 - 11.1 K/uL    RBC 5.03 4.05 - 5.2 M/uL    HGB 13.7 11.7 - 15.4 g/dL    HCT 43.1 35.8 - 46.3 %    MCV 85.7 79.6 - 97.8 FL    MCH 27.2 26.1 - 32.9 PG    MCHC 31.8 31.4 - 35.0 g/dL    RDW 13.8 11.9 - 14.6 %    PLATELET 408 575 - 212 K/uL    MPV 11.4 9.4 - 12.3 FL    ABSOLUTE NRBC 0.00 0.0 - 0.2 K/uL    DF AUTOMATED      NEUTROPHILS 51 43 - 78 %    LYMPHOCYTES 37 13 - 44 %    MONOCYTES 8 4.0 - 12.0 %    EOSINOPHILS 3 0.5 - 7.8 %    BASOPHILS 1 0.0 - 2.0 %    IMMATURE GRANULOCYTES 0 0.0 - 5.0 %    ABS. NEUTROPHILS 4.3 1.7 - 8.2 K/UL    ABS. LYMPHOCYTES 3.1 0.5 - 4.6 K/UL    ABS. MONOCYTES 0.7 0.1 - 1.3 K/UL    ABS. EOSINOPHILS 0.3 0.0 - 0.8 K/UL    ABS. BASOPHILS 0.1 0.0 - 0.2 K/UL    ABS. IMM.  GRANS. 0.0 0.0 - 0.5 K/UL   METABOLIC PANEL, COMPREHENSIVE    Collection Time: 02/05/21  3:13 AM   Result Value Ref Range    Sodium 140 136 - 145 mmol/L    Potassium 3.3 (L) 3.5 - 5.1 mmol/L    Chloride 107 98 - 107 mmol/L    CO2 24 21 - 32 mmol/L    Anion gap 9 7 - 16 mmol/L    Glucose 106 (H) 65 - 100 mg/dL    BUN 9 8 - 23 MG/DL    Creatinine 0.71 0.6 - 1.0 MG/DL    GFR est AA >60 >60 ml/min/1.73m2    GFR est non-AA >60 >60 ml/min/1.73m2    Calcium 9.1 8.3 - 10.4 MG/DL    Bilirubin, total 0.9 0.2 - 1.1 MG/DL    ALT (SGPT) 23 12 - 65 U/L    AST (SGOT) 30 15 - 37 U/L    Alk. phosphatase 126 50 - 136 U/L    Protein, total 8.2 6.3 - 8.2 g/dL    Albumin 4.3 3.2 - 4.6 g/dL    Globulin 3.9 (H) 2.3 - 3.5 g/dL    A-G Ratio 1.1 (L) 1.2 - 3.5     TROPONIN-HIGH SENSITIVITY    Collection Time: 02/05/21  3:13 AM   Result Value Ref Range    Troponin-High Sensitivity 43.5 (H) 0 - 14 pg/mL   MAGNESIUM    Collection Time: 02/05/21  3:13 AM   Result Value Ref Range    Magnesium 2.1 1.8 - 2.4 mg/dL       Patient has been seen and examined by Dr. Martina Moscoso and he agrees with the following assessment and plan:     Assessment/Plan:          Atrial tachycardia -- admit to telemetry. Continue IV amiodarone. NPO now in case procedure is needed in the AM. Check echocardiogram -- LA mildly dilated in 2014. Will not increase BB dose with bradycardia prior to discharge. Give 40 mEq oral K now      Coronary artery disease -- last [facility] 2 days ago with patient previously placed stent to left circumflex. Continue ASA, Plavix, Zetia, ARB and Coreg      Mixed hyperlipidemia -- on Zetia. Intolerant to statins      GERD (gastroesophageal reflux disease) -- continue PPI      Essential hypertension -- uncontrolled on arrival to the ER. Improved after IV lopressor. Continue Coreg and losartan on admission. Monitor BP closely. Titrate medications. Shirley Nichols NP  2/5/2021 4:08 AM    Attending Addendum    Patient independently seen and examined by me. Agree with above note by physician extender with the following additions and exceptions: 68 y.o. female with past medical history of CAD, GERD, HTN and HLD who presented to the ER with recurrent atrial tachycardia    Key findings are:  No CP or ANDERSON  CV- RRR without murmur  Lungs- Clear bilaterally  Ext- no edema    Plan: Discussed options at length with Pt and daughter. Pt has responded well to IV amiodarone, will transition to PO amiodarone today, cont telemetry.  Discussed risks of amio toxicity, will re evaluate options as OP including EPS and PPM and AV node ablation. Further plan pending clinical course    Macy SHEFFIELD.  [address] [facility] Cardiology

## 2021-02-05 NOTE — ED TRIAGE NOTES
Arrives with face mask in place. Reports rapid heart rate, \"burning\" to chest, lightheadedness, nausea,shortness of breath. Onset approx 30mins pta when awakened. Similar episode with admission here 2/3 NSVT. Discharge yesterday.

## 2021-02-05 NOTE — PROGRESS NOTES
Bedside and Verbal shift change report given to [facility] Grace (oncoming nurse) by self (offgoing nurse). Report included the following information SBAR, Kardex, Intake/Output and MAR.

## 2021-02-05 NOTE — ED PROVIDER NOTES
Patient presents to the ER with concerns of elevated heart rate. States approximate hour ago she woke up feeling her heart racing. States this was accompanied with some burning sensation in her chest and throat. States she felt dizzy and lightheaded. Called her daughter who subsequently brought patient to the ER. Patient actually was just discharged from the hospital yesterday after admission for similar complaint. Was found    The history is provided by the patient. Rapid Heart Rate  This is a new problem. The current episode started 1 to 2 hours ago. The problem occurs constantly. The problem has not changed since onset. Pertinent negatives include no chest pain, no abdominal pain, no headaches and no shortness of breath. She has tried nothing for the symptoms.         Past Medical History:   Diagnosis Date    Arrhythmia     FAST HEART BEAT    Arthritis     OSTEO    Bell's palsy 12/7/2015    Bell's palsy     Cancer ([facility] [address]     melona    Cataract  NOS 12/7/2015    Coronary artery disease 6/23/2009    GERD (gastroesophageal reflux disease)     ACID REFLUX    Hx of varicose vein ligation and stripping 5/16/2016    LEFT 30+ yrs ago    Hyperlipidemia 12/7/2015    Hypertension     Macular degeneration (senile), unspecified 12/7/2015    Nerve damage     from shingles    Osteoarthrosis involving multiple sites but not generalized 12/7/2015    Osteoporosis   NOS 12/7/2015    Varicose vein of leg        Past Surgical History:   Procedure Laterality Date    HX APPENDECTOMY      HX HEART CATHETERIZATION      with 1 stent    HX HYSTERECTOMY      HX OTHER SURGICAL      BREAST IMPLANTS, BLADDER TACT         Family History:   Problem Relation Age of Onset    Heart Attack Father     Heart Attack Sister     Cancer Sister         one sister has ovarian cancer    Diabetes Mother     Other Mother         vv    Diabetes Maternal Grandmother     Other Maternal Grandmother         vv    Heart Disease Paternal Grandmother     Heart Attack Paternal Uncle        Social History     Socioeconomic History    Marital status:      Spouse name: Not on file    Number of children: Not on file    Years of education: Not on file    Highest education level: Not on file   Occupational History    Not on file   Social Needs    Financial resource strain: Not on file    Food insecurity     Worry: Not on file     Inability: Not on file    Transportation needs     Medical: Not on file     Non-medical: Not on file   Tobacco Use    Smoking status: Never Smoker    Smokeless tobacco: Never Used   Substance and Sexual Activity    Alcohol use:  Yes     Alcohol/week: 17.5 standard drinks     Types: 7 Glasses of wine, 14 Cans of beer per week    Drug use: No    Sexual activity: Not Currently     Birth control/protection: Surgical   Lifestyle    Physical activity     Days per week: Not on file     Minutes per session: Not on file    Stress: Not on file   Relationships    Social connections     Talks on phone: Not on file     Gets together: Not on file     Attends Advent service: Not on file     Active member of club or organization: Not on file     Attends meetings of clubs or organizations: Not on file     Relationship status: Not on file    Intimate partner violence     Fear of current or ex partner: Not on file     Emotionally abused: Not on file     Physically abused: Not on file     Forced sexual activity: Not on file   Other Topics Concern     Service No    Blood Transfusions No    Caffeine Concern No    Occupational Exposure No    Hobby Hazards No    Sleep Concern No    Stress Concern No    Weight Concern No    Special Diet No    Back Care No    Exercise Yes    Bike Helmet No    Seat Belt Yes    Self-Exams No   Social History Narrative    Not on file         ALLERGIES: Penicillins and Sulfa (sulfonamide antibiotics)    Review of Systems   Constitutional: Negative for fatigue and fever.   HENT: Negative for congestion and dental problem. Eyes: Negative for photophobia and redness. Respiratory: Positive for chest tightness. Negative for choking and shortness of breath. Cardiovascular: Positive for palpitations. Negative for chest pain and leg swelling. Gastrointestinal: Negative for abdominal pain, diarrhea, nausea and vomiting. Genitourinary: Negative for decreased urine volume, dysuria and vaginal bleeding. Musculoskeletal: Negative for back pain and gait problem. Skin: Negative for color change and pallor. Neurological: Negative for seizures, speech difficulty and headaches. Hematological: Negative for adenopathy. Does not bruise/bleed easily. Psychiatric/Behavioral: Negative for behavioral problems and confusion. All other systems reviewed and are negative. Vitals:    02/05/21 0311   BP: (!) 207/103   Pulse: 83   Resp: 16   Temp: 97.7 °F (36.5 °C)   SpO2: 96%   Weight: 68.9 kg (152 lb)   Height: 5' 2\" (1.575 m)            Physical Exam  Vitals signs and nursing note reviewed. Constitutional:       Appearance: Normal appearance. HENT:      Head: Normocephalic and atraumatic. Right Ear: Tympanic membrane normal.      Left Ear: Tympanic membrane normal.      Nose: Nose normal. No congestion. Eyes:      Extraocular Movements: Extraocular movements intact. Conjunctiva/sclera: Conjunctivae normal.      Pupils: Pupils are equal, round, and reactive to light. Neck:      Musculoskeletal: Normal range of motion and neck supple. No neck rigidity or muscular tenderness. Cardiovascular:      Rate and Rhythm: Normal rate and regular rhythm. Pulses: Normal pulses. Heart sounds: Normal heart sounds. No murmur. Pulmonary:      Effort: Pulmonary effort is normal. No respiratory distress. Breath sounds: Normal breath sounds. No stridor. Abdominal:      General: Abdomen is flat. Bowel sounds are normal. There is no distension. Palpations: Abdomen is soft. Musculoskeletal:         General: No swelling, tenderness or deformity. Skin:     General: Skin is warm and dry. Capillary Refill: Capillary refill takes less than 2 seconds. Coloration: Skin is not jaundiced. Findings: No bruising. Neurological:      General: No focal deficit present. Mental Status: She is alert and oriented to person, place, and time. Mental status is at baseline. Cranial Nerves: No cranial nerve deficit. Sensory: No sensory deficit. MDM  Number of Diagnoses or Management Options  Wide-complex tachycardia ([address]  Diagnosis management comments: Patient is currently appears to be in a sinus rhythm. Thought of her previous hospitalization and that she was in atrial tachycardia with a left bundle branch block. He has a Holter monitor, will try to see whether we can get information from the Holter when event happened earlier this evening    3:49 AM  Patient went back into a wide-complex tachycardia, treated with amiodarone push. Cardiology at the bedside here. Will start amiodarone infusion, Lopressor as well. 4:25 AM  Patient has since been stabilized remains in a sinus rhythm currently. Well-appearing. Cardiology will admit for further medical management.        Amount and/or Complexity of Data Reviewed  Clinical lab tests: ordered and reviewed  Tests in the radiology section of CPT®: ordered and reviewed  Review and summarize past medical records: yes  Discuss the patient with other providers: yes  Independent visualization of images, tracings, or specimens: yes    Risk of Complications, Morbidity, and/or Mortality  Presenting problems: high  Diagnostic procedures: moderate  Management options: high  General comments: Critical Care Time 60 Minutes: Excluding all billable procedures, time spent at the bedside directing patients resuscitation, updating family, and coordinating care with consultants      Patient Progress  Patient progress: stable         Procedures      Results Include:    Recent Results (from the past 24 hour(s))   METABOLIC PANEL, BASIC    Collection Time: 02/04/21  4:30 AM   Result Value Ref Range    Sodium 143 136 - 145 mmol/L    Potassium 3.3 (L) 3.5 - 5.1 mmol/L    Chloride 111 (H) 98 - 107 mmol/L    CO2 26 21 - 32 mmol/L    Anion gap 6 (L) 7 - 16 mmol/L    Glucose 94 65 - 100 mg/dL    BUN 10 8 - 23 MG/DL    Creatinine 0.48 (L) 0.6 - 1.0 MG/DL    GFR est AA >60 >60 ml/min/1.73m2    GFR est non-AA >60 >60 ml/min/1.73m2    Calcium 8.6 8.3 - 10.4 MG/DL   CBC W/O DIFF    Collection Time: 02/04/21  4:30 AM   Result Value Ref Range    WBC 8.6 4.3 - 11.1 K/uL    RBC 4.21 4.05 - 5.2 M/uL    HGB 11.8 11.7 - 15.4 g/dL    HCT 36.2 35.8 - 46.3 %    MCV 86.0 79.6 - 97.8 FL    MCH 28.0 26.1 - 32.9 PG    MCHC 32.6 31.4 - 35.0 g/dL    RDW 13.8 11.9 - 14.6 %    PLATELET 880 063 - 198 K/uL    MPV 11.4 9.4 - 12.3 FL    ABSOLUTE NRBC 0.00 0.0 - 0.2 K/uL   LIPID PANEL    Collection Time: 02/04/21  4:30 AM   Result Value Ref Range    LIPID PROFILE          Cholesterol, total 131 <200 MG/DL    Triglyceride 113 35 - 150 MG/DL    HDL Cholesterol 46 40 - 60 MG/DL    LDL, calculated 62.4 <100 MG/DL    VLDL, calculated 22.6 6.0 - 23.0 MG/DL    CHOL/HDL Ratio 2.8     CBC WITH AUTOMATED DIFF    Collection Time: 02/05/21  3:13 AM   Result Value Ref Range    WBC 8.5 4.3 - 11.1 K/uL    RBC 5.03 4.05 - 5.2 M/uL    HGB 13.7 11.7 - 15.4 g/dL    HCT 43.1 35.8 - 46.3 %    MCV 85.7 79.6 - 97.8 FL    MCH 27.2 26.1 - 32.9 PG    MCHC 31.8 31.4 - 35.0 g/dL    RDW 13.8 11.9 - 14.6 %    PLATELET 202 996 - 267 K/uL    MPV 11.4 9.4 - 12.3 FL    ABSOLUTE NRBC 0.00 0.0 - 0.2 K/uL    DF AUTOMATED      NEUTROPHILS 51 43 - 78 %    LYMPHOCYTES 37 13 - 44 %    MONOCYTES 8 4.0 - 12.0 %    EOSINOPHILS 3 0.5 - 7.8 %    BASOPHILS 1 0.0 - 2.0 %    IMMATURE GRANULOCYTES 0 0.0 - 5.0 %    ABS. NEUTROPHILS 4.3 1.7 - 8.2 K/UL    ABS.  LYMPHOCYTES 3.1 0.5 - 4.6 K/UL ABS. MONOCYTES 0.7 0.1 - 1.3 K/UL    ABS. EOSINOPHILS 0.3 0.0 - 0.8 K/UL    ABS. BASOPHILS 0.1 0.0 - 0.2 K/UL    ABS. IMM. GRANS. 0.0 0.0 - 0.5 K/UL     Voice dictation software was used during the making of this note. This software is not perfect and grammatical and other typographical errors may be present. This note has been proofread, but may still contain errors.   Osbaldo Pope MD; 2/5/2021 @3:50 AM   ===================================================================

## 2021-02-05 NOTE — ROUTINE PROCESS
TRANSFER - IN REPORT: 
 
Verbal report received from [facility] on Sariah Vanda being received from [facility] ED for routine progression of care. Report consisted of patients Situation, Background, Assessment and Recommendations(SBAR). Information from the following report(s) SBAR, Kardex and MAR was reviewed. Opportunity for questions and clarification was provided. Assessment completed upon patients arrival to unit and care assumed. Patient received to room 316. Patient connected to monitor and assessment completed. Plan of care reviewed. Patient oriented to room and call light. Patient aware to use call light to communicate any chest pain or needs. Admission skin assessment completed with second RN and reveals the following: Skin intact. Scattered bruising noted. Heels and buttocks visualized; C/D&I.

## 2021-02-05 NOTE — ED NOTES
TRANSFER - OUT REPORT:    Verbal report given to Cindy(name) on Jose A Lerma  being transferred to Telemetry(unit) for routine progression of care       Report consisted of patients Situation, Background, Assessment and   Recommendations(SBAR). Information from the following report(s) SBAR was reviewed with the receiving nurse. Lines:   Peripheral IV 02/05/21 Right Antecubital (Active)   Site Assessment Clean, dry, & intact 02/05/21 0312   Phlebitis Assessment 0 02/05/21 0312   Infiltration Assessment 0 02/05/21 0312   Dressing Status Clean, dry, & intact 02/05/21 0312   Dressing Type Transparent 02/05/21 0312   Hub Color/Line Status Red 02/05/21 0312       Peripheral IV 02/05/21 Left Hand (Active)        Opportunity for questions and clarification was provided.       Patient transported with:   Registered Nurse

## 2021-02-06 ENCOUNTER — APPOINTMENT (OUTPATIENT)
Dept: CT IMAGING | Age: 81
DRG: 310 | End: 2021-02-06
Attending: INTERNAL MEDICINE
Payer: MEDICARE

## 2021-02-06 PROBLEM — R20.2 PARESTHESIA: Status: ACTIVE | Noted: 2021-02-06

## 2021-02-06 PROBLEM — R29.810 FACIAL DROOP: Status: ACTIVE | Noted: 2021-02-06

## 2021-02-06 PROBLEM — E87.6 HYPOKALEMIA: Status: ACTIVE | Noted: 2021-02-06

## 2021-02-06 LAB
ANION GAP SERPL CALC-SCNC: 5 MMOL/L (ref 7–16)
ANION GAP SERPL CALC-SCNC: 7 MMOL/L (ref 7–16)
BASOPHILS # BLD: 0.1 K/UL (ref 0–0.2)
BASOPHILS NFR BLD: 1 % (ref 0–2)
BUN SERPL-MCNC: 10 MG/DL (ref 8–23)
BUN SERPL-MCNC: 10 MG/DL (ref 8–23)
CALCIUM SERPL-MCNC: 8.3 MG/DL (ref 8.3–10.4)
CALCIUM SERPL-MCNC: 8.5 MG/DL (ref 8.3–10.4)
CHLORIDE SERPL-SCNC: 108 MMOL/L (ref 98–107)
CHLORIDE SERPL-SCNC: 109 MMOL/L (ref 98–107)
CHOLEST SERPL-MCNC: 130 MG/DL
CK SERPL-CCNC: 60 U/L (ref 21–215)
CO2 SERPL-SCNC: 26 MMOL/L (ref 21–32)
CO2 SERPL-SCNC: 28 MMOL/L (ref 21–32)
CREAT SERPL-MCNC: 0.54 MG/DL (ref 0.6–1)
CREAT SERPL-MCNC: 0.85 MG/DL (ref 0.6–1)
DIFFERENTIAL METHOD BLD: ABNORMAL
EOSINOPHIL # BLD: 0.3 K/UL (ref 0–0.8)
EOSINOPHIL NFR BLD: 3 % (ref 0.5–7.8)
ERYTHROCYTE [DISTWIDTH] IN BLOOD BY AUTOMATED COUNT: 13.8 % (ref 11.9–14.6)
GLUCOSE BLD STRIP.AUTO-MCNC: 122 MG/DL (ref 65–100)
GLUCOSE SERPL-MCNC: 118 MG/DL (ref 65–100)
GLUCOSE SERPL-MCNC: 93 MG/DL (ref 65–100)
HCT VFR BLD AUTO: 36 % (ref 35.8–46.3)
HDLC SERPL-MCNC: 50 MG/DL (ref 40–60)
HDLC SERPL: 2.6 {RATIO}
HGB BLD-MCNC: 11.6 G/DL (ref 11.7–15.4)
IMM GRANULOCYTES # BLD AUTO: 0 K/UL (ref 0–0.5)
IMM GRANULOCYTES NFR BLD AUTO: 0 % (ref 0–5)
LDLC SERPL CALC-MCNC: 63.2 MG/DL
LIPID PROFILE,FLP: NORMAL
LYMPHOCYTES # BLD: 2.8 K/UL (ref 0.5–4.6)
LYMPHOCYTES NFR BLD: 31 % (ref 13–44)
MCH RBC QN AUTO: 27.9 PG (ref 26.1–32.9)
MCHC RBC AUTO-ENTMCNC: 32.2 G/DL (ref 31.4–35)
MCV RBC AUTO: 86.5 FL (ref 79.6–97.8)
MONOCYTES # BLD: 0.8 K/UL (ref 0.1–1.3)
MONOCYTES NFR BLD: 9 % (ref 4–12)
NEUTS SEG # BLD: 5.1 K/UL (ref 1.7–8.2)
NEUTS SEG NFR BLD: 57 % (ref 43–78)
NRBC # BLD: 0 K/UL (ref 0–0.2)
PLATELET # BLD AUTO: 203 K/UL (ref 150–450)
PMV BLD AUTO: 11.1 FL (ref 9.4–12.3)
POTASSIUM SERPL-SCNC: 3.4 MMOL/L (ref 3.5–5.1)
POTASSIUM SERPL-SCNC: 3.5 MMOL/L (ref 3.5–5.1)
RBC # BLD AUTO: 4.16 M/UL (ref 4.05–5.2)
SODIUM SERPL-SCNC: 141 MMOL/L (ref 136–145)
SODIUM SERPL-SCNC: 142 MMOL/L (ref 136–145)
T3 SERPL-MCNC: 1.06 NG/ML (ref 0.6–1.81)
T4 FREE SERPL-MCNC: 1.4 NG/DL (ref 0.78–1.46)
TRIGL SERPL-MCNC: 84 MG/DL (ref 35–150)
TROPONIN-HIGH SENSITIVITY: 29.1 PG/ML (ref 0–14)
VLDLC SERPL CALC-MCNC: 16.8 MG/DL (ref 6–23)
WBC # BLD AUTO: 9 K/UL (ref 4.3–11.1)

## 2021-02-06 PROCEDURE — 36415 COLL VENOUS BLD VENIPUNCTURE: CPT

## 2021-02-06 PROCEDURE — 82550 ASSAY OF CK (CPK): CPT

## 2021-02-06 PROCEDURE — 84480 ASSAY TRIIODOTHYRONINE (T3): CPT

## 2021-02-06 PROCEDURE — 99218 HC RM OBSERVATION: CPT

## 2021-02-06 PROCEDURE — 84484 ASSAY OF TROPONIN QUANT: CPT

## 2021-02-06 PROCEDURE — 80307 DRUG TEST PRSMV CHEM ANLYZR: CPT

## 2021-02-06 PROCEDURE — 80061 LIPID PANEL: CPT

## 2021-02-06 PROCEDURE — 85025 COMPLETE CBC W/AUTO DIFF WBC: CPT

## 2021-02-06 PROCEDURE — 80048 BASIC METABOLIC PNL TOTAL CA: CPT

## 2021-02-06 PROCEDURE — 70450 CT HEAD/BRAIN W/O DYE: CPT

## 2021-02-06 PROCEDURE — 99225 PR SBSQ OBSERVATION CARE/DAY 25 MINUTES: CPT | Performed by: INTERNAL MEDICINE

## 2021-02-06 PROCEDURE — 84439 ASSAY OF FREE THYROXINE: CPT

## 2021-02-06 PROCEDURE — 74011250637 HC RX REV CODE- 250/637: Performed by: INTERNAL MEDICINE

## 2021-02-06 PROCEDURE — 74011250637 HC RX REV CODE- 250/637: Performed by: NURSE PRACTITIONER

## 2021-02-06 PROCEDURE — 82962 GLUCOSE BLOOD TEST: CPT

## 2021-02-06 RX ORDER — SODIUM CHLORIDE 0.9 % (FLUSH) 0.9 %
5-40 SYRINGE (ML) INJECTION AS NEEDED
Status: DISCONTINUED | OUTPATIENT
Start: 2021-02-06 | End: 2021-02-08 | Stop reason: HOSPADM

## 2021-02-06 RX ORDER — FLUTICASONE PROPIONATE 50 MCG
2 SPRAY, SUSPENSION (ML) NASAL DAILY
Status: DISCONTINUED | OUTPATIENT
Start: 2021-02-06 | End: 2021-02-08 | Stop reason: HOSPADM

## 2021-02-06 RX ORDER — LEVOTHYROXINE SODIUM 75 UG/1
75 TABLET ORAL
Status: DISCONTINUED | OUTPATIENT
Start: 2021-02-07 | End: 2021-02-08 | Stop reason: HOSPADM

## 2021-02-06 RX ORDER — AMIODARONE HYDROCHLORIDE 200 MG/1
400 TABLET ORAL EVERY 12 HOURS
Status: DISCONTINUED | OUTPATIENT
Start: 2021-02-06 | End: 2021-02-08 | Stop reason: HOSPADM

## 2021-02-06 RX ORDER — DOXYCYCLINE 100 MG/1
100 CAPSULE ORAL EVERY 12 HOURS
Status: DISCONTINUED | OUTPATIENT
Start: 2021-02-06 | End: 2021-02-08 | Stop reason: HOSPADM

## 2021-02-06 RX ORDER — SODIUM CHLORIDE 0.9 % (FLUSH) 0.9 %
5-40 SYRINGE (ML) INJECTION EVERY 8 HOURS
Status: DISCONTINUED | OUTPATIENT
Start: 2021-02-06 | End: 2021-02-08 | Stop reason: HOSPADM

## 2021-02-06 RX ADMIN — HYDROCODONE BITARTRATE AND ACETAMINOPHEN 1 TABLET: 5; 325 TABLET ORAL at 16:19

## 2021-02-06 RX ADMIN — AMIODARONE HYDROCHLORIDE 200 MG: 200 TABLET ORAL at 08:20

## 2021-02-06 RX ADMIN — Medication 10 ML: at 14:21

## 2021-02-06 RX ADMIN — EZETIMIBE 10 MG: 10 TABLET ORAL at 08:19

## 2021-02-06 RX ADMIN — Medication 10 ML: at 21:41

## 2021-02-06 RX ADMIN — Medication 10 ML: at 06:26

## 2021-02-06 RX ADMIN — AMIODARONE HYDROCHLORIDE 400 MG: 200 TABLET ORAL at 21:39

## 2021-02-06 RX ADMIN — CARVEDILOL 6.25 MG: 6.25 TABLET, FILM COATED ORAL at 08:20

## 2021-02-06 RX ADMIN — FLUTICASONE PROPIONATE 2 SPRAY: 50 SPRAY, METERED NASAL at 21:40

## 2021-02-06 RX ADMIN — LOSARTAN POTASSIUM 50 MG: 50 TABLET, FILM COATED ORAL at 08:20

## 2021-02-06 RX ADMIN — PANTOPRAZOLE SODIUM 40 MG: 40 TABLET, DELAYED RELEASE ORAL at 06:25

## 2021-02-06 RX ADMIN — DOXYCYCLINE HYCLATE 100 MG: 100 CAPSULE ORAL at 21:40

## 2021-02-06 RX ADMIN — CARVEDILOL 6.25 MG: 6.25 TABLET, FILM COATED ORAL at 16:18

## 2021-02-06 RX ADMIN — ASPIRIN 81 MG: 81 TABLET, CHEWABLE ORAL at 08:20

## 2021-02-06 RX ADMIN — ALUMINUM HYDROXIDE, MAGNESIUM HYDROXIDE, AND SIMETHICONE 30 ML: 200; 200; 20 SUSPENSION ORAL at 22:43

## 2021-02-06 RX ADMIN — CLOPIDOGREL BISULFATE 75 MG: 75 TABLET ORAL at 08:20

## 2021-02-06 RX ADMIN — ZOLPIDEM TARTRATE 10 MG: 5 TABLET ORAL at 22:47

## 2021-02-06 NOTE — ROUTINE PROCESS
Bedside and Verbal shift change report given to self (oncoming nurse) by Alexa Moncada (offgoing nurse). Report included the following information SBAR, Kardex, Intake/Output and MAR.

## 2021-02-06 NOTE — ROUTINE PROCESS
Verbal bedside report received from Yuki Dowling, [address]. Assumed care of patient.

## 2021-02-06 NOTE — ROUTINE PROCESS
Verbal bedside report given to narciso Mccurdy RN. Patient's situation, background, assessment and recommendations provided. Opportunity for questions provided. Oncoming RN assumed care of patient.

## 2021-02-06 NOTE — PROGRESS NOTES
2/6/2021 8:25 AM    Admit Date: 2/5/2021        Subjective:     Basil Amparo reports feeling better No more episodes of tachycardia Pt had gone home and came right back with  Now on PO amio .seen by EP            Objective:      Visit Vitals  BP (!) 144/65   Pulse 60   Temp 98.3 °F (36.8 °C)   Resp 21   Ht 5' 2\" (1.575 m)   Wt 149 lb 4 oz (67.7 kg)   SpO2 92%   BMI 27.30 kg/m²       Physical Exam:  Heart: regular rate and rhythm  Lungs: clear to auscultation bilaterally  Abdomen: soft, non-tender.  Bowel sounds normal. No masses,  no organomegaly  Extremities: no edema    Data Review:   Labs:    Recent Results (from the past 24 hour(s))   METABOLIC PANEL, BASIC    Collection Time: 02/06/21  3:56 AM   Result Value Ref Range    Sodium 142 136 - 145 mmol/L    Potassium 3.4 (L) 3.5 - 5.1 mmol/L    Chloride 109 (H) 98 - 107 mmol/L    CO2 26 21 - 32 mmol/L    Anion gap 7 7 - 16 mmol/L    Glucose 93 65 - 100 mg/dL    BUN 10 8 - 23 MG/DL    Creatinine 0.54 (L) 0.6 - 1.0 MG/DL    GFR est AA >60 >60 ml/min/1.73m2    GFR est non-AA >60 >60 ml/min/1.73m2    Calcium 8.3 8.3 - 10.4 MG/DL   LIPID PANEL    Collection Time: 02/06/21  3:56 AM   Result Value Ref Range    LIPID PROFILE          Cholesterol, total 130 <200 MG/DL    Triglyceride 84 35 - 150 MG/DL    HDL Cholesterol 50 40 - 60 MG/DL    LDL, calculated 63.2 <100 MG/DL    VLDL, calculated 16.8 6.0 - 23.0 MG/DL    CHOL/HDL Ratio 2.6         Telemetry: normal sinus rhythm  Recent  Cath stent patent nl EF        Assessment:     Patient Active Problem List    Diagnosis Date Noted    Coronary artery diseasestable on recent cath 06/23/2009     Priority: 1 - One    Mixed hyperlipidemia 06/23/2009     Priority: 2 - Two    Atrial tachycardia (HCC) recurrent now on amio 02/05/2021    Essential hypertension 03/27/2018    H/O heart artery stent 03/27/2018    LBBB (left bundle branch block) 03/27/2018    Hx of varicose vein ligation and stripping 05/16/2016    Varicose vein of leg     Bell's palsy 12/07/2015    Osteoporosis   NOS 12/07/2015    Macular degeneration (senile), unspecified 12/07/2015    Osteoarthrosis involving multiple sites but not generalized 12/07/2015    Cataract  NOS 12/07/2015    GERD (gastroesophageal reflux disease) 03/20/2014       Plan:     Continue Po amio monitor today

## 2021-02-07 ENCOUNTER — APPOINTMENT (OUTPATIENT)
Dept: MRI IMAGING | Age: 81
DRG: 310 | End: 2021-02-07
Attending: INTERNAL MEDICINE
Payer: MEDICARE

## 2021-02-07 LAB
AMPHET UR QL SCN: NEGATIVE
BARBITURATES UR QL SCN: NEGATIVE
BENZODIAZ UR QL: NEGATIVE
CANNABINOIDS UR QL SCN: NEGATIVE
CHOLEST SERPL-MCNC: 136 MG/DL
COCAINE UR QL SCN: NEGATIVE
EST. AVERAGE GLUCOSE BLD GHB EST-MCNC: 108 MG/DL
HBA1C MFR BLD: 5.4 % (ref 4.2–6.3)
HDLC SERPL-MCNC: 53 MG/DL (ref 40–60)
HDLC SERPL: 2.6 {RATIO}
LDLC SERPL CALC-MCNC: 63.4 MG/DL
LIPID PROFILE,FLP: NORMAL
METHADONE UR QL: NEGATIVE
OPIATES UR QL: POSITIVE
PCP UR QL: NEGATIVE
TRIGL SERPL-MCNC: 98 MG/DL (ref 35–150)
VLDLC SERPL CALC-MCNC: 19.6 MG/DL (ref 6–23)

## 2021-02-07 PROCEDURE — 80061 LIPID PANEL: CPT

## 2021-02-07 PROCEDURE — 74011000250 HC RX REV CODE- 250: Performed by: INTERNAL MEDICINE

## 2021-02-07 PROCEDURE — 83036 HEMOGLOBIN GLYCOSYLATED A1C: CPT

## 2021-02-07 PROCEDURE — 99218 HC RM OBSERVATION: CPT

## 2021-02-07 PROCEDURE — A9575 INJ GADOTERATE MEGLUMI 0.1ML: HCPCS | Performed by: INTERNAL MEDICINE

## 2021-02-07 PROCEDURE — 70544 MR ANGIOGRAPHY HEAD W/O DYE: CPT

## 2021-02-07 PROCEDURE — 74011250637 HC RX REV CODE- 250/637: Performed by: INTERNAL MEDICINE

## 2021-02-07 PROCEDURE — 99225 PR SBSQ OBSERVATION CARE/DAY 25 MINUTES: CPT | Performed by: INTERNAL MEDICINE

## 2021-02-07 PROCEDURE — 74011250636 HC RX REV CODE- 250/636: Performed by: INTERNAL MEDICINE

## 2021-02-07 PROCEDURE — 97110 THERAPEUTIC EXERCISES: CPT

## 2021-02-07 PROCEDURE — 70551 MRI BRAIN STEM W/O DYE: CPT

## 2021-02-07 PROCEDURE — 74011250637 HC RX REV CODE- 250/637: Performed by: NURSE PRACTITIONER

## 2021-02-07 PROCEDURE — 97165 OT EVAL LOW COMPLEX 30 MIN: CPT

## 2021-02-07 PROCEDURE — 97161 PT EVAL LOW COMPLEX 20 MIN: CPT

## 2021-02-07 PROCEDURE — 36415 COLL VENOUS BLD VENIPUNCTURE: CPT

## 2021-02-07 PROCEDURE — 70549 MR ANGIOGRAPH NECK W/O&W/DYE: CPT

## 2021-02-07 PROCEDURE — 92610 EVALUATE SWALLOWING FUNCTION: CPT

## 2021-02-07 PROCEDURE — C8929 TTE W OR WO FOL WCON,DOPPLER: HCPCS

## 2021-02-07 RX ORDER — SODIUM CHLORIDE 0.9 % (FLUSH) 0.9 %
10 SYRINGE (ML) INJECTION
Status: COMPLETED | OUTPATIENT
Start: 2021-02-07 | End: 2021-02-07

## 2021-02-07 RX ORDER — LOSARTAN POTASSIUM 50 MG/1
100 TABLET ORAL DAILY
Status: DISCONTINUED | OUTPATIENT
Start: 2021-02-07 | End: 2021-02-08 | Stop reason: HOSPADM

## 2021-02-07 RX ORDER — AMOXICILLIN 250 MG
1 CAPSULE ORAL DAILY PRN
Status: DISCONTINUED | OUTPATIENT
Start: 2021-02-07 | End: 2021-02-08 | Stop reason: HOSPADM

## 2021-02-07 RX ORDER — AMOXICILLIN 250 MG
1 CAPSULE ORAL DAILY
Status: DISCONTINUED | OUTPATIENT
Start: 2021-02-08 | End: 2021-02-07

## 2021-02-07 RX ORDER — GADOTERATE MEGLUMINE 376.9 MG/ML
20 INJECTION INTRAVENOUS
Status: COMPLETED | OUTPATIENT
Start: 2021-02-07 | End: 2021-02-07

## 2021-02-07 RX ADMIN — Medication 5 ML: at 21:41

## 2021-02-07 RX ADMIN — Medication 10 ML: at 14:07

## 2021-02-07 RX ADMIN — LOSARTAN POTASSIUM 50 MG: 50 TABLET, FILM COATED ORAL at 08:44

## 2021-02-07 RX ADMIN — ZOLPIDEM TARTRATE 10 MG: 5 TABLET ORAL at 21:39

## 2021-02-07 RX ADMIN — Medication 10 ML: at 06:11

## 2021-02-07 RX ADMIN — GADOTERATE MEGLUMINE 20 ML: 376.9 INJECTION INTRAVENOUS at 14:06

## 2021-02-07 RX ADMIN — NITROGLYCERIN 1 INCH: 20 OINTMENT TOPICAL at 17:02

## 2021-02-07 RX ADMIN — ASPIRIN 81 MG: 81 TABLET, CHEWABLE ORAL at 08:43

## 2021-02-07 RX ADMIN — EZETIMIBE 10 MG: 10 TABLET ORAL at 08:43

## 2021-02-07 RX ADMIN — LEVOTHYROXINE SODIUM 75 MCG: 0.07 TABLET ORAL at 06:07

## 2021-02-07 RX ADMIN — ALUMINUM HYDROXIDE, MAGNESIUM HYDROXIDE, AND SIMETHICONE 30 ML: 200; 200; 20 SUSPENSION ORAL at 21:38

## 2021-02-07 RX ADMIN — CLOPIDOGREL BISULFATE 75 MG: 75 TABLET ORAL at 08:44

## 2021-02-07 RX ADMIN — Medication 20 ML: at 06:11

## 2021-02-07 RX ADMIN — Medication 5 ML: at 21:40

## 2021-02-07 RX ADMIN — CARVEDILOL 6.25 MG: 6.25 TABLET, FILM COATED ORAL at 08:44

## 2021-02-07 RX ADMIN — AMIODARONE HYDROCHLORIDE 400 MG: 200 TABLET ORAL at 21:39

## 2021-02-07 RX ADMIN — CARVEDILOL 6.25 MG: 6.25 TABLET, FILM COATED ORAL at 17:02

## 2021-02-07 RX ADMIN — LOSARTAN POTASSIUM 100 MG: 50 TABLET, FILM COATED ORAL at 10:44

## 2021-02-07 RX ADMIN — SENNOSIDES AND DOCUSATE SODIUM 1 TABLET: 8.6; 5 TABLET ORAL at 21:39

## 2021-02-07 RX ADMIN — DOXYCYCLINE HYCLATE 100 MG: 100 CAPSULE ORAL at 08:44

## 2021-02-07 RX ADMIN — PANTOPRAZOLE SODIUM 40 MG: 40 TABLET, DELAYED RELEASE ORAL at 06:07

## 2021-02-07 RX ADMIN — PERFLUTREN 1 ML: 6.52 INJECTION, SUSPENSION INTRAVENOUS at 10:30

## 2021-02-07 RX ADMIN — DOXYCYCLINE HYCLATE 100 MG: 100 CAPSULE ORAL at 21:39

## 2021-02-07 RX ADMIN — AMIODARONE HYDROCHLORIDE 400 MG: 200 TABLET ORAL at 08:43

## 2021-02-07 RX ADMIN — Medication 10 ML: at 14:06

## 2021-02-07 NOTE — PROGRESS NOTES
good visit with the patient and her family   both were in great spirits   very thankful for the care they have received   encourage them with words of hope and prayer  they were thankful

## 2021-02-07 NOTE — PROGRESS NOTES
SPEECH LANGUAGE PATHOLOGY: DYSPHAGIA- Initial Assessment and Discharge    NAME/AGE/GENDER: Cezar Olmstead is a 68 y.o. female  DATE: 2/7/2021  PRIMARY DIAGNOSIS: Atrial tachycardia ([facility] [I47.1]      ICD-10: Treatment Diagnosis: R13.12 Dysphagia, Oropharyngeal Phase    RECOMMENDATIONS   DIET:    continue prescribed diet    MEDICATIONS: With liquid     ASPIRATION PRECAUTIONS  · Slow rate of intake  · Small bites/sips  · Upright at 90 degrees during meal     COMPENSATORY STRATEGIES/MODIFICATIONS  · None     RECOMMENDATIONS for CONTINUED SPEECH THERAPY:   No further speech therapy indicated at this time. ASSESSMENT   Patient presents with baseline Bell's Palsy x6 years. Patient reports increased feelings of numbness on left side of face yesterday which has mostly resolved. Not interfering with speech and/or swallowing. Recommend continue regular diet/thin liquids. CONTINUATION OF SKILLED SERVICES/MEDICAL NECESSITY:   No additional speech services warranted. EDUCATION:  · Recommendations discussed with Patient      PLAN    FREQUENCY/DURATION: No further speech therapy indicated at this time as oropharyngeal swallow function is within normal limits. - Recommendations for next treatment session: No additional speech therapy indicated at this time. SUBJECTIVE   Pleasant, provides good history.     Oxygen Device: n/a  Pain: Pain Scale 1: Numeric (0 - 10)  Pain Intensity 1: 0    History of Present Injury/Illness: Ms. Primitivo Hirsch  has a past medical history of Arrhythmia, Arthritis, Bell's palsy (12/7/2015), Bell's palsy, Cancer ([facility] Cataract  NOS (12/7/2015), Coronary artery disease (6/23/2009), GERD (gastroesophageal reflux disease), varicose vein ligation and stripping (5/16/2016), Hyperlipidemia (12/7/2015), Hypertension, Macular degeneration (senile), unspecified (12/7/2015), Nerve damage, Osteoarthrosis involving multiple sites but not generalized (12/7/2015), Osteoporosis   NOS (12/7/2015), and Varicose vein of leg. She also has no past medical history of Asthma, Autoimmune disease ([facility] Chronic kidney disease, Chronic pain, COPD, Dementia, Diabetes ([address] Endocrine disease, Heart failure ([facility] Infectious disease, Liver disease, Other ill-defined conditions(799.89), Psychiatric disorder, PUD (peptic ulcer disease), Seizures ([facility] Sleep disorder, Stroke ([facility] Thromboembolus ([facility] or Thyroid disease. . She also  has a past surgical history that includes hx appendectomy; hx hysterectomy; hx other surgical; and hx heart catheterization. PRECAUTIONS/ALLERGIES: Penicillins and Sulfa (sulfonamide antibiotics)     Problem List:  (Impairments causing functional limitations):  1. ?CVA    Previous Dysphagia: NONE REPORTED  Diet Prior to Evaluation: regular/thin    Orientation:  Person  Place  Time  Situation    Cognitive-Linguistic Screening:   Speech Production:   o WFL - reports slur with fatigue [id number]   Expressive Language:  o WFL   Receptive Language:  o WFL   Cognition:   o WFL  Prior Level of Function: home/independent  Recommendations: Given results of screening, patient appears to be functioning at baseline. No acute assessment of speech, language, or cognition warranted. OBJECTIVE   Oral Motor:   · Labial: Decreased rate, Decreased seal and Left droop  · Dentition: Intact  · Oral Hygiene: Adequate  · Lingual: No impairment    Swallow evaluation:   Patient consumed trials of thin liquids via cup and straw sip, mixed consistency fruit and solid. Adequate mastication and oral clearance noted. No overt s/sx with any/all consistencies presented.     Tool Used: Dysphagia Outcome and Severity Scale (ARLET)    Score Comments   Normal Diet  [x] 7 With no strategies or extra time needed   Functional Swallow  [] 6 May have mild oral or pharyngeal delay   Mild Dysphagia  [] 5 Which may require one diet consistency restricted    Mild-Moderate Dysphagia  [] 4 With 1-2 diet consistencies restricted   Moderate Dysphagia  [] 3 With 2 or more diet consistencies restricted   Moderate-Severe Dysphagia  [] 2 With partial PO strategies (trials with ST only)   Severe Dysphagia  [] 1 With inability to tolerate any PO safely      Score:  Initial: 7 Most Recent: x (Date 02/07/21 )   Interpretation of Tool: The Dysphagia Outcome and Severity Scale (ARLET) is a simple, easy-to-use, 7-point scale developed to systematically rate the functional severity of dysphagia based on objective assessment and make recommendations for diet level, independence level, and type of nutrition. Current Medications:   No current facility-administered medications on file prior to encounter. Current Outpatient Medications on File Prior to Encounter   Medication Sig Dispense Refill    aspirin 81 mg chewable tablet Take 1 Tab by mouth daily.  omeprazole (PRILOSEC) 40 mg capsule TAKE ONE CAPSULE BY MOUTH ONCE DAILY 60 Cap 0    carvediloL (COREG) 6.25 mg tablet Take 1 Tab by mouth two (2) times daily (with meals). 180 Tab 3    clopidogreL (PLAVIX) 75 mg tab TAKE ONE TABLET BY MOUTH ONCE DAILY 90 Tab 3    ezetimibe (ZETIA) 10 mg tablet Take 1 Tab by mouth daily. 90 Tab 3    losartan (COZAAR) 50 mg tablet Take 1 Tab by mouth daily. 90 Tab 3    zolpidem (Ambien) 10 mg tablet Take 10 mg by mouth nightly as needed for Sleep.  calcitRIOL (ROCALTROL) 0.5 mcg capsule Take 1 Cap by mouth daily. 30 Cap 5    fluticasone (FLONASE) 50 mcg/actuation nasal spray 2 Sprays by Both Nostrils route daily. 1 Bottle 2    Bifidobacterium Infantis (ALIGN) 4 mg cap Take 1 Cap by mouth daily. 7 Cap 0          After treatment position/precautions:  · Upright in bed    Total Treatment Duration:   Time In: 1224  Time Out: Doctor Wanda 91  MS Holland, CCC-SLP

## 2021-02-07 NOTE — PROGRESS NOTES
Speech Pathology Note:    Patient currently with diagnostic imaging in room. Will re-attempt as able. Thank you,  Griselda Lino MS, CCC-SLP

## 2021-02-07 NOTE — PROGRESS NOTES
Make Dr. Juan Miguel Alicia aware dysphagia screening was completed and pt was able to swallow with no problems. MD stated okay for oral intake and PO med admin.

## 2021-02-07 NOTE — PROGRESS NOTES
Hospitalist Progress Note    2021  Admit Date: 2021  3:14 AM   NAME: Vijay Penn   :  1940   MRN:  [id number]   Attending: Joy Barnett MD  PCP:  Dena Trejo MD    SUBJECTIVE:     CC/ consult reason- CODE S         Requesting- cardiology, Dr Meliza Oliver  Ms. Garcia Fonseca is a 67 yo female with PMH of CAD s/p C 2-3-21, NSVTACH/atrial tachycardia, HTN, HLP- statin intolerance,  Roscoe palsy 2017-residual left facial droop and ptosis,  who is evaluated at the request of cardiology for CODE S called due acutely worsening left facial droop and bilateral facial numbness. She states that her facial paresthesia is mostly on her nasal bridge and maxillary sinus area and has been present for several days. She has some dental sensitivity as well. She has tried nasal sprays. She denies focal limb weakness, change in speech, no change in taste or smell. She has some dyspnea and edema. No chest pain. She is unaware of prior thyroid issues, TSH elevated. She just started amiodarone this admit. She is chronically constipated and admits to fatigue. Denies skin changes.         CODE S called, s/p STAT CT head without acute issues. S/p tele neuro consult- I spoke with neurology who recommends MRI brain and MRA head/ neck. She is already on asa/plavix and intolerant to  Statins and takes zetia.      Interval History (): patient examined at bedside. No further overnight other than what has previously been documented from Code S. She does not have anymore \"tingling\" on the left side. She thought \"maybe I was getting a sinus infection. \" She has a known history of Bell's palsy. At baseline she says she will get dry eye and some drooping in the left side of her mouth. The numbness/tingling she experienced last night was entirely new, however.      Review of Systems negative with exception of pertinent positives noted above  PHYSICAL EXAM     Visit Vitals  BP (!) 179/79 (BP 1 Location: Right upper arm, BP Patient Position: Supine)   Pulse 61   Temp 98 °F (36.7 °C)   Resp 20   Ht 5' 2\" (1.575 m)   Wt 67.3 kg (148 lb 4.8 oz)   SpO2 92%   BMI 27.12 kg/m²      Temp (24hrs), Av.1 °F (36.7 °C), Min:97.6 °F (36.4 °C), Max:98.4 °F (36.9 °C)    Oxygen Therapy  O2 Sat (%): 92 % (21 0800)  Pulse via Oximetry: 64 beats per minute (21 0807)  O2 Device: Room air (21 0400)  O2 Flow Rate (L/min): 2 l/min (21 1619)    Intake/Output Summary (Last 24 hours) at 2021 1018  Last data filed at 2021 0250  Gross per 24 hour   Intake    Output 950 ml   Net -950 ml      General: No acute distress, pleasant   Lungs:  CTA Bilaterally. Heart:  Regular rate and rhythm,  No murmur, rub, or gallop  Abdomen: Soft, Non distended, Non tender, Positive bowel sounds  Extremities: No cyanosis, clubbing or edema  Neurologic:  CN II thru XII intact b/l. +5/5 muscle strength and sensation to light touch intact throughout.      ASSESSMENT      Active Hospital Problems    Diagnosis Date Noted    Coronary artery disease 2009     Priority: 1 - One    Mixed hyperlipidemia 2009     Priority: 2 - Two    Facial droop 2021    Paresthesia 2021    Hypokalemia 2021    Atrial tachycardia ([address] 2021    Essential hypertension 2018    LBBB (left bundle branch block) 2018    H/O heart artery stent 2018    Bell's palsy 2015    GERD (gastroesophageal reflux disease) 2014     Plan:    # Stroke-like symptoms  - STAT CT head negative for acute intracranial process  - MRI/MRA head/neck ordered and pending  - neuro checks  - PT/OT  - cleared by speech therapy  - would avoid NSAIDs and heparin products for now  - continue ASA and Plavix  - BP goal is <130/80  - TTE pending  - patient intolerant of statins, continue ezetimibe     # Hypothyroidism  - started on levothyroxine 75 mcg  - needs recheck TSH in 4-6 weeks  - started on amiodarone by primary    # Atrial tachycardia, CAD, HTN  - management by cardiology    Ppx: SCDs for VTE    Thank you for this consult. Hospitalist will continue to follow along. Please page/call with any questions or concerns. Discussed with patient at bedside. All questions answered.      Signed By: Carola Maehr DO     February 7, 2021

## 2021-02-07 NOTE — PROGRESS NOTES
Dual NIH completed at bedside on shift change       02/07/21 0715   NIH Stroke Scale   Interval Other (comment)   LOC 0   LOC Questions 0   LOC Commands 0   Best Gaze 0   Visual 0   Facial Palsy 1   Motor Right Arm 0   Motor Left Arm 0   Motor Right Leg 0   Motor Left Leg 0   Limb Ataxia 0   Sensory 0   Best Language 0   Dysarthria 0   Extinction and Inattention 0   Total 1

## 2021-02-07 NOTE — PROGRESS NOTES
Bedside and Verbal shift change report given to [employer] (oncoming nurse) by self (offgoing nurse).  Report included the following information SBAR, Kardex, Intake/Output and MAR.        02/06/21 1908   NIH Stroke Scale   Interval Other (comment)  (shift change)   LOC 0   LOC Questions 0   LOC Commands 0   Best Gaze 0   Visual 0   Facial Palsy 1   Motor Right Arm 0   Motor Left Arm 0   Motor Right Leg 0   Motor Left Leg 0   Limb Ataxia 0   Sensory 1   Best Language 0   Dysarthria 0   Extinction and Inattention 0   Total 2

## 2021-02-07 NOTE — PROGRESS NOTES
NIH completed q4h and documented in flowsheet. Her tingling sensation varied in diff locations, from L cheekbone to bilateral cheekbones to nasal bridge throughout the night. At 0600 pt no longer has tingling and numbness sensation on her face. Mild L facial droop still present.

## 2021-02-07 NOTE — CONSULTS
Hospitalist Consult Note     Admit Date:  2021  3:14 AM   Name:  Cecy Dukes   Age:  80 y.o.  :  1940   MRN:  [id number]   PCP:  Amalia Kellogg MD  Treatment Team: Attending Provider: Rickey Madrigal MD; Care Manager: Kailee Steve MSW; Utilization Review: Sandra Alvarez RN; Primary Nurse: Lory Posadas; Consulting Provider: Destiny Diana MD; Hospitalist: Destiny Diana MD    HPI/Subjective:     CC/ consult reason- CODE S       Requesting- cardiology, Dr Terry      Ms. Dukes is a 81 yo female with PMH of CAD s/p C 2-3-21, NSVTACH/atrial tachycardia, HTN, HLP- statin intolerance,  [place] palsy 2017-residual left facial droop and ptosis,  who is evaluated at the request of cardiology for CODE S called due acutely worsening left facial droop and bilateral facial numbness. She states that her facial paresthesia is mostly on her nasal bridge and maxillary sinus area and has been present for several days. She has some dental sensitivity as well. She has tried nasal sprays. She denies focal limb weakness, change in speech, no change in taste or smell. She has some dyspnea and edema. No chest pain.   She is unaware of prior thyroid issues, TSH elevated. She just started amiodarone this admit. She is chronically constipated and admits to fatigue. Denies skin changes.       CODE S called, s/p STAT CT head without acute issues. S/p tele neuro consult- I spoke with neurology who recommends MRI brain and MRA head/ neck. She is already on asa/plavix and intolerant to  Statins and takes zetia.         10 systems reviewed and negative except as noted in HPI.- has palpitations          Past Medical History:   Diagnosis Date   • Arrhythmia     FAST HEART BEAT   • Arthritis     OSTEO   • Bell's palsy 2015   • Bell's palsy    • Cancer (HCC)     melona   • Cataract  NOS 2015   • Coronary artery disease 2009   • GERD (gastroesophageal reflux disease)     ACID REFLUX   •  Hx of varicose vein ligation and stripping 5/16/2016    LEFT 30+ yrs ago    Hyperlipidemia 12/7/2015    Hypertension     Macular degeneration (senile), unspecified 12/7/2015    Nerve damage     from shingles    Osteoarthrosis involving multiple sites but not generalized 12/7/2015    Osteoporosis   NOS 12/7/2015    Varicose vein of leg       Past Surgical History:   Procedure Laterality Date    HX APPENDECTOMY      HX HEART CATHETERIZATION      with 1 stent    HX HYSTERECTOMY      HX OTHER SURGICAL      BREAST IMPLANTS, BLADDER TACT      Allergies   Allergen Reactions    Penicillins Rash    Sulfa (Sulfonamide Antibiotics) Rash      Social History     Tobacco Use    Smoking status: Never Smoker    Smokeless tobacco: Never Used   Substance Use Topics    Alcohol use:  Yes     Alcohol/week: 17.5 standard drinks     Types: 7 Glasses of wine, 14 Cans of beer per week      Family History   Problem Relation Age of Onset    Heart Attack Father     Heart Attack Sister    Rosa Alvares Cancer Sister         one sister has ovarian cancer    Diabetes Mother     Other Mother         vv    Diabetes Maternal Grandmother     Other Maternal Grandmother         vv    Heart Disease Paternal Grandmother     Heart Attack Paternal Uncle         Immunization History   Administered Date(s) Administered    Influenza Vaccine 09/20/2013    Influenza Vaccine (>6 mo Afluria QUAD Vial 06541 (0.25 mL) / 64646 (0.5 mL)) 10/21/2016    Influenza Vaccine (Quad) Mdck Pf (>4 Yrs Flucelvax QUAD [id number]) 09/20/2017    Influenza Vaccine (Quad) PF (>6 Mo Flulaval, Fluarix, and >3 Yrs Afluria, Fluzone [id number]) 10/17/2018    Influenza Vaccine PF 12/07/2015    Pneumococcal Conjugate (PCV-13) 12/07/2015    Pneumococcal Polysaccharide (PPSV-23) 11/30/2010    Zoster Vaccine, Live 09/09/2009       Objective:     Patient Vitals for the past 24 hrs:   Temp Pulse Resp BP SpO2   02/06/21 1715 98.3 °F (36.8 °C) 62 20 138/60 94 %   02/06/21 1618  60  (!) 176/73    02/06/21 1202 98.4 °F (36.9 °C) 62 18 (!) 150/53 96 %   02/06/21 0805 98.3 °F (36.8 °C) 60 21 (!) 144/65 92 %   02/06/21 0547 98.6 °F (37 °C) 61 16 (!) 149/65 91 %   02/06/21 0039 98.5 °F (36.9 °C) 62 17 (!) 146/66 94 %   02/05/21 2032 98.6 °F (37 °C) 62 18 (!) 150/54 94 %     Oxygen Therapy  O2 Sat (%): 94 % (02/06/21 1715)  Pulse via Oximetry: 64 beats per minute (02/05/21 0807)  O2 Device: Room air (02/06/21 1618)  O2 Flow Rate (L/min): 2 l/min (02/05/21 1619)    Estimated body mass index is 27.3 kg/m² as calculated from the following:    Height as of this encounter: 5' 2\" (1.575 m). Weight as of this encounter: 67.7 kg (149 lb 4 oz). Intake/Output Summary (Last 24 hours) at 2/6/2021 2025  Last data filed at 2/6/2021 0547  Gross per 24 hour   Intake 0 ml   Output 100 ml   Net -100 ml       *Note that automatically entered I/Os may not be accurate; dependent on patient compliance with collection and accurate  by assistants. Physical Exam:  General:    Well nourished. No overt distress. Eyes:   Normal sclerae. Extraocular movements intact. HENT:  Normocephalic, atraumatic. Moist mucous membranes  CV:   RRR. III/VI RADHAMES LUSB,  No edema  Lungs:  CTAB. No wheezing, rhonchi, or rales. Unlabored  Abdomen: Soft, nontender, nondistended. Extremities: Warm and dry. No cyanosis or clubbing  Neurologic: She has mild left lower facial droop facial Sensation intact. 5/5 extremity strength  Skin:     No rashes. No jaundice. Normal coloration  Psych:  Normal mood and affect. I reviewed the labs, imaging, EKGs, telemetry, and other studies done this admission.       Data Review:   Recent Results (from the past 24 hour(s))   METABOLIC PANEL, BASIC    Collection Time: 02/06/21  3:56 AM   Result Value Ref Range    Sodium 142 136 - 145 mmol/L    Potassium 3.4 (L) 3.5 - 5.1 mmol/L    Chloride 109 (H) 98 - 107 mmol/L    CO2 26 21 - 32 mmol/L    Anion gap 7 7 - 16 mmol/L    Glucose 93 65 - 100 mg/dL    BUN 10 8 - 23 MG/DL    Creatinine 0.54 (L) 0.6 - 1.0 MG/DL    GFR est AA >60 >60 ml/min/1.73m2    GFR est non-AA >60 >60 ml/min/1.73m2    Calcium 8.3 8.3 - 10.4 MG/DL   LIPID PANEL    Collection Time: 02/06/21  3:56 AM   Result Value Ref Range    LIPID PROFILE          Cholesterol, total 130 <200 MG/DL    Triglyceride 84 35 - 150 MG/DL    HDL Cholesterol 50 40 - 60 MG/DL    LDL, calculated 63.2 <100 MG/DL    VLDL, calculated 16.8 6.0 - 23.0 MG/DL    CHOL/HDL Ratio 2.6     GLUCOSE, POC    Collection Time: 02/06/21  6:11 PM   Result Value Ref Range    Glucose (POC) 122 (H) 65 - 416 mg/dL   METABOLIC PANEL, BASIC    Collection Time: 02/06/21  7:14 PM   Result Value Ref Range    Sodium 141 136 - 145 mmol/L    Potassium 3.5 3.5 - 5.1 mmol/L    Chloride 108 (H) 98 - 107 mmol/L    CO2 28 21 - 32 mmol/L    Anion gap 5 (L) 7 - 16 mmol/L    Glucose 118 (H) 65 - 100 mg/dL    BUN 10 8 - 23 MG/DL    Creatinine 0.85 0.6 - 1.0 MG/DL    GFR est AA >60 >60 ml/min/1.73m2    GFR est non-AA >60 >60 ml/min/1.73m2    Calcium 8.5 8.3 - 10.4 MG/DL   CBC WITH AUTOMATED DIFF    Collection Time: 02/06/21  7:14 PM   Result Value Ref Range    WBC 9.0 4.3 - 11.1 K/uL    RBC 4.16 4.05 - 5.2 M/uL    HGB 11.6 (L) 11.7 - 15.4 g/dL    HCT 36.0 35.8 - 46.3 %    MCV 86.5 79.6 - 97.8 FL    MCH 27.9 26.1 - 32.9 PG    MCHC 32.2 31.4 - 35.0 g/dL    RDW 13.8 11.9 - 14.6 %    PLATELET 358 982 - 879 K/uL    MPV 11.1 9.4 - 12.3 FL    ABSOLUTE NRBC 0.00 0.0 - 0.2 K/uL    DF AUTOMATED      NEUTROPHILS 57 43 - 78 %    LYMPHOCYTES 31 13 - 44 %    MONOCYTES 9 4.0 - 12.0 %    EOSINOPHILS 3 0.5 - 7.8 %    BASOPHILS 1 0.0 - 2.0 %    IMMATURE GRANULOCYTES 0 0.0 - 5.0 %    ABS. NEUTROPHILS 5.1 1.7 - 8.2 K/UL    ABS. LYMPHOCYTES 2.8 0.5 - 4.6 K/UL    ABS. MONOCYTES 0.8 0.1 - 1.3 K/UL    ABS. EOSINOPHILS 0.3 0.0 - 0.8 K/UL    ABS. BASOPHILS 0.1 0.0 - 0.2 K/UL    ABS. IMM.  GRANS. 0.0 0.0 - 0.5 K/UL   CK    Collection Time: 02/06/21  7:14 PM   Result Value Ref Range    CK 60 21 - 215 U/L   TROPONIN-HIGH SENSITIVITY    Collection Time: 02/06/21  7:14 PM   Result Value Ref Range    Troponin-High Sensitivity 29.1 (H) 0 - 14 pg/mL       All Micro Results     None          Current Facility-Administered Medications   Medication Dose Route Frequency    amiodarone (CORDARONE) tablet 400 mg  400 mg Oral Q12H    sodium chloride (NS) flush 5-40 mL  5-40 mL IntraVENous Q8H    sodium chloride (NS) flush 5-40 mL  5-40 mL IntraVENous PRN    aspirin chewable tablet 81 mg  81 mg Oral DAILY    carvediloL (COREG) tablet 6.25 mg  6.25 mg Oral BID WITH MEALS    clopidogreL (PLAVIX) tablet 75 mg  75 mg Oral DAILY    ezetimibe (ZETIA) tablet 10 mg  10 mg Oral DAILY    losartan (COZAAR) tablet 50 mg  50 mg Oral DAILY    pantoprazole (PROTONIX) tablet 40 mg  40 mg Oral ACB    zolpidem (AMBIEN) tablet 10 mg  10 mg Oral QHS PRN    alum-mag hydroxide-simeth (MYLANTA) oral suspension 30 mL  30 mL Oral Q4H PRN    sodium chloride (NS) flush 5-40 mL  5-40 mL IntraVENous Q8H    sodium chloride (NS) flush 5-40 mL  5-40 mL IntraVENous PRN    nitroglycerin (NITROBID) 2 % ointment 1 Inch  1 Inch Topical Q6H    nitroglycerin (NITROSTAT) tablet 0.4 mg  0.4 mg SubLINGual Q5MIN PRN    morphine injection 2 mg  2 mg IntraVENous Q4H PRN    acetaminophen (TYLENOL) tablet 650 mg  650 mg Oral Q4H PRN    HYDROcodone-acetaminophen (NORCO) 5-325 mg per tablet 1 Tab  1 Tab Oral Q4H PRN    ondansetron (ZOFRAN) injection 4 mg  4 mg IntraVENous Q4H PRN       Other Studies:  Xr Chest Port    Result Date: 2/5/2021  EXAM: XR CHEST PORT INDICATION: chest pain-shortness of breath COMPARISON: 2/3/2021 FINDINGS: A portable AP radiograph of the chest was obtained at 0421 hours. The patient is on a cardiac monitor. The lungs are clear. The cardiac and mediastinal contours and pulmonary vascularity are normal.  The bones and soft tissues are grossly within normal limits.      Normal chest.    Ct Code Neuro Head Wo Contrast    Result Date: 2/6/2021  EXAM: CT head without contrast. INDICATION: Code stroke, left-sided numbness and facial droop, history of Bell's palsy. COMPARISON: Head CT dated August 11, 2009. Multiple axial images obtained through the brain without intravenous contrast. Radiation dose reduction techniques were used for this study: All CT scans performed at this facility use one or all of the following: Automated exposure control, adjustment of the mA and/or kVp according to patient's size, iterative reconstruction. FINDINGS: Mild cerebral atrophy. No evidence of intracranial mass, hemorrhage, or large territorial infarct. The ventricles are normal in size and position. The basal cisterns are patent. No extra-axial fluid collection or mass effect. The orbital contents are within normal limits. The paranasal sinuses are clear. The mastoid air cells and middle ears are clear. No significant osseous or extracranial soft tissue lesions. 1. No evidence of acute intracranial abnormality.       SARS-CoV-2 Lab Results  \"Novel Coronavirus\" Test: No results found for: COV2NT   \"Emergent Disease\" Test: No results found for: EDPR  \"SARS-COV-2\" Test: No results found for: XGCOVT  Rapid Test: No results found for: COVR         Assessment and Plan:     Hospital Problems as of 2/6/2021 Date Reviewed: 2/5/2021          Codes Class Noted - Resolved POA    Coronary artery disease ICD-10-CM: I25.10  ICD-9-CM: 414.00  6/23/2009 - Present Yes        Mixed hyperlipidemia (Chronic) ICD-10-CM: E78.2  ICD-9-CM: 272.2  6/23/2009 - Present Yes        Facial droop ICD-10-CM: R29.810  ICD-9-CM: 781.94  2/6/2021 - Present No        Paresthesia ICD-10-CM: R20.2  ICD-9-CM: 782.0  2/6/2021 - Present No        Hypokalemia ICD-10-CM: E87.6  ICD-9-CM: 276.8  2/6/2021 - Present Yes        * (Principal) Atrial tachycardia ([address] ICD-10-CM: I47.1  ICD-9-CM: 427.89  2/5/2021 - Present Unknown        Essential hypertension ICD-10-CM: I10  ICD-9-CM: 401.9  3/27/2018 - Present Yes        H/O heart artery stent ICD-10-CM: Z95.5  ICD-9-CM: V45.82  3/27/2018 - Present Yes        LBBB (left bundle branch block) ICD-10-CM: I44.7  ICD-9-CM: 426.3  3/27/2018 - Present Yes        Bell's palsy ICD-10-CM: G51.0  ICD-9-CM: 351.0  12/7/2015 - Present Yes        GERD (gastroesophageal reflux disease) ICD-10-CM: K21.9  ICD-9-CM: 530.81  3/20/2014 - Present Yes              Plan:  · Facial paresthesia with acute on chronic left lower facial droop, prior [place] palsy:  · Discussed with tele neuro  · Will send for MRI brain and MRA head/ neck  · Continued asa, plavix, zetia  · Ischemic Stroke order set compelted  · followup ECHO  · Check A1C, FLP   · Treat for possible sinusitis with doxycycline and floanse- has PCN and sulfa allergies      · Hypothyroidism:  · Treat with synthroid 75 mcg  · Check free T4 and T3 total now  · TSH recehck in 8 weeks  · Noted amiodarone just begun this week      · Atrial tachycardia, CAD, HTN:  · Defer to cardiology      Other listed chronic conditions stable, continue current management.     Signed:  Armando Ngo MD

## 2021-02-07 NOTE — ROUTINE PROCESS
Verbal bedside report given to narciso Vance RN. Patient's situation, background, assessment and recommendations provided. Opportunity for questions provided. Oncoming RN assumed care of patient. NIH completed.

## 2021-02-07 NOTE — PROGRESS NOTES
Verbal bedside report received from [facility]. Assumed care of patient.  Dual NIH completed with outgoing RN

## 2021-02-07 NOTE — PROGRESS NOTES
ACUTE OT GOALS:  (Developed with and agreed upon by patient and/or caregiver.)  1. Patient will complete lower body dressing with Supervision. 2. Patient will complete bed mobility with independence. 3. Patient will tolerate 8 minutes of OT treatment with no rest breaks to increase activity tolerance for ADLs. 4. Patient will complete functional transfers with Supervision and no loss of balance. 5. Patient will complete hallway mobility with Supervision and O2 sats >90% to increase activity tolerance for household distances. Timeframe: 7 visits   GOALS MET    OCCUPATIONAL THERAPY ASSESSMENT: Initial Assessment, Daily Note, Discharge and PM OT Treatment Day # 1    Jennifer Duke is a 68 y.o. female   PRIMARY DIAGNOSIS: Atrial tachycardia ([address]  Atrial tachycardia ([facility] [address] [I47.1]    Reason for Referral:  Generalized Weakness  ICD-10: Treatment Diagnosis: Generalized Muscle Weakness (M62.81)  OBSERV[place] / [place] / Product Type: Medicare /   ASSESSMENT:     REHAB RECOMMENDATIONS:   Recommendation to date pending progress:  Setting:   No further skilled therapy   Equipment:    None     PRIOR LEVEL OF FUNCTION:  (Prior to Hospitalization)  INITIAL/CURRENT LEVEL OF FUNCTION:  (Based on today's evaluation)   Bathing:   Modified Independent with grab bar. Dressing:   Independent  Feeding/Grooming:   Independent  Toileting:   Independent  Functional Mobility:   Modified Independent with use of SPC for community mobility. Bathing:   Supervision  Dressing:   Supervision  Feeding/Grooming:   Supervision in standing. Toileting:   Supervision  Functional Mobility:   Supervision     ASSESSMENT:  Ms. Cher Sow was admitted for Atrial tachycardia. Pt requires Supervision to complete standing ADLs, functional transfers, and room/hallway mobility for monitoring of HR and O2 sats. Pt completes one full lap in hallway with HR in upper 60s and 70s and O2 sats 90% and above.  Pt reports increased fatigue following return to edge of bed and requires seated rest break. Pt educated on use of pursed-lip breathing technique to assist with increased SOB and decreased activity tolerance for community distances. Vitals assessed at end of session: HR 68 bpm and O2 sats 97%. Pt appears to be functioning at baseline for performance of ADLs and functional mobility. Pt main deficit appears to be decreased activity tolerance for community distances. At this time, pt is most appropriate to d/c from acute OT caseload with no additional OT needs and defer to PT for decreased activity tolerance. SUBJECTIVE:   Ms. Belita Gaucher states, \"I feel short of breath when I walk and wear a mask. \"    SOCIAL HISTORY/LIVING ENVIRONMENT: Pt lives in one level ground floor apartment with [address]. Pt is typically Mod I to independent for performance of ADLs. Pt is independent for in home mobility but reports use of SPC for community mobility. Pt drives very short distances. Pt has had no falls. Pt has PMH of Bell's Palsy (2017). Pt has daughter who works as CRNA at [employer].     OBJECTIVE:     PAIN: VITAL SIGNS: LINES/DRAINS:   Pre Treatment: Pain Screen  Pain Scale 1: Numeric (0 - 10)  Pain Intensity 1: 0  Post Treatment: Unchanged Vital Signs  Pulse (Heart Rate): 68  O2 Sat (%): 97 %  O2 Device: Room air O2 Device: Room air     GROSS EVALUATION:  BUE  Within Functional Limits Abnormal/ Functional Abnormal/ Non-Functional (see comments) Not Tested Comments:   AROM [x] [] [] []    PROM [x] [] [] []    Strength [x] [] [] []    Balance [x] [] [] []    Posture [x] [] [] []    Sensation [x] [] [] []    Coordination [x] [] [] []    Tone [x] [] [] []    Edema [x] [] [] []    Activity Tolerance [] [x] [] []     [] [] [] []      COGNITION/  PERCEPTION: Intact Impaired   (see comments) Comments:   Orientation [x] []    Vision [] [x]    Hearing [x] []    Judgment/ Insight [x] []    Attention [x] []    Memory [x] []    Command Following [x] []    Emotional Regulation [x] []     [] []      ACTIVITIES OF DAILY LIVING: I Mod I S SBA CGA Min Mod Max Total NT Comments   BASIC ADLs:              Bathing/ Showering [] [] [] [] [] [] [] [] [] [x]    Toileting [] [] [] [] [] [] [] [] [] [x]    Dressing [] [] [x] [] [] [] [] [] [] [] Sock management seated edge of bed. Feeding [] [] [] [] [] [] [] [] [] [x]    Grooming [] [] [] [] [] [] [] [] [] [x]    Personal Device Care [] [] [] [] [] [] [] [] [] [x]    Functional Mobility [] [] [x] [] [] [] [] [] [] [] Without use of DME. I=Independent, Mod I=Modified Independent, S=Supervision, SBA=Standby Assistance, CGA=Contact Guard Assistance,   Min=Minimal Assistance, Mod=Moderate Assistance, Max=Maximal Assistance, Total=Total Assistance, NT=Not Tested    MOBILITY: I Mod I S SBA CGA Min Mod Max Total  NT x2 Comments:   Supine to sit [x] [] [] [] [] [] [] [] [] [] []    Sit to supine [] [] [] [] [] [] [] [] [x] [] [] Pt kindly requested to stay seated edge of bed. Sit to stand [] [] [x] [] [] [] [] [] [] [] []    Bed to chair [] [] [] [] [] [] [] [] [] [x] [] Supervision to complete in room and hallway mobility. I=Independent, Mod I=Modified Independent, S=Supervision, SBA=Standby Assistance, CGA=Contact Guard Assistance,   Min=Minimal Assistance, Mod=Moderate Assistance, Max=Maximal Assistance, Total=Total Assistance, NT=Not [address]   Daily Activity Inpatient Short Form        How much help from another person does the patient currently need. .. Total A Lot A Little None   1. Putting on and taking off regular lower body clothing? [] 1   [] 2   [] 3   [x] 4   2. Bathing (including washing, rinsing, drying)? [] 1   [] 2   [] 3   [x] 4   3. Toileting, which includes using toilet, bedpan or urinal?   [] 1   [] 2   [] 3   [x] 4   4. Putting on and taking off regular upper body clothing? [] 1   [] 2   [] 3   [x] 4   5.   Taking care of personal grooming such as brushing teeth? [] 1   [] 2   [] 3   [x] 4   6. Eating meals? [] 1   [] 2   [] 3   [x] 4   © 2007, Trustees of [facility] [employer], under license to [employer]. All rights reserved     Score:  Initial: 24, completed, 2/7/2021 Most Recent: X (Date: -- )   Interpretation of Tool:  Represents activities that are increasingly more difficult (i.e. Bed mobility, Transfers, Gait). PLAN:   FREQUENCY/DURATION: OT Plan of Care: (1x visit) for duration of hospital stay or until stated goals are met, whichever comes first.    PROBLEM LIST:   (Skilled intervention is medically necessary to address:)  1. Decreased Activity Tolerance   INTERVENTIONS PLANNED:   (Benefits and precautions of occupational therapy have been discussed with the patient.)  1. Therapeutic Exercise/HEP  2. Education     TREATMENT:     EVALUATION: Low Complexity : (Untimed Charge)    TREATMENT:   Therapeutic Exercise (8 Minutes): Therapeutic exercises noted below to improve functional activity tolerance, strength and mobility. AFTER TREATMENT POSITION/PRECAUTIONS:  Bed, Needs within reach, RN notified and pt seated edge of bed with daughter seated adjacent.     INTERDISCIPLINARY COLLABORATION:  RN/PCT and OT/JANG    TOTAL TREATMENT DURATION:  OT Patient Time In/Time Out  Time In: 1400  Time Out: 1418    ASHLI Thayer/RHIANNON

## 2021-02-07 NOTE — PROGRESS NOTES
ACUTE PHYSICAL THERAPY GOALS:  (Developed with and agreed upon by patient and/or caregiver.)  1. Pt will be able to perform bed mobility and transfers with independence in order to be able to safely function within their home within 7 treatment days. 2. Pt will be able to ambulate 400 ft with supervision and use of least restrictive device in order to return to home and community ambulation within 7 treatment days. 3. Pt will be able to ambulate up/down 3 stairs with SBA in order to safely ambulate within the community within 7 treatment days. 4. Pt will demonstrate normal standing and sitting balance with independence in order to safely perform functional mobility and ADLS within 7 treatment days. PHYSICAL THERAPY ASSESSMENT: Initial Assessment PT Treatment Day # 1      Jennifer Duke is a 68 y.o. female   PRIMARY DIAGNOSIS: Atrial tachycardia ([address]  Atrial tachycardia ([address] [I47.1]       Reason for Referral:  Atrial tachycardia  ICD-10: Treatment Diagnosis: Generalized Muscle Weakness (M62.81)  OBSERV[place]: [place] / [place] / Product Type: Medicare /     ASSESSMENT:     REHAB RECOMMENDATIONS:   Recommendation to date pending progress:  Setting:   No further skilled therapy   Equipment:    To Be Determined     PRIOR LEVEL OF FUNCTION:  (Prior to Hospitalization) INITIAL/CURRENT LEVEL OF FUNCTION:  (Most Recently Demonstrated)   Bed Mobility:   Independent  Sit to Stand:   Independent  Transfers:   Independent  Gait/Mobility:   Independent Bed Mobility:   Standby Assistance  Sit to Stand:  [facility] [employer] Assistance  Transfers:   Standby Assistance  Gait/Mobility:   Standby Assistance     ASSESSMENT:  Ms. Cher Sow is a 68year old female admitted with atrial tachycardia. Her PMH includes CAD, GERD, HTN, HLD and L eye blindness. Pt states that she is feeling a little weaker than normal and demonstrates some slight generalized weakness.  Pt ambulates 250' with SBA and decreased gait speed, narrow NOE and decreased step length. No LOB or decreased stability noted. Pt is functioning below her baseline at this time and would benefit from continued PT services to facilitate improvements in strength and safety with mobility. SUBJECTIVE:   Ms. Judy Watson states, \"I can. \"    SOCIAL HISTORY/LIVING ENVIRONMENT:   Home Environment: Apartment  One/Two Story Residence: One story  Living Alone: Yes  Support Systems: Family member(s)  OBJECTIVE:     PAIN: VITAL SIGNS: LINES/DRAINS:   Pre Treatment: Pain Screen  Pain Scale 1: Numeric (0 - 10)  Pain Intensity 1: 0  Post Treatment: 0   none  O2 Device: Room air     GROSS EVALUATION:   Within Functional Limits Abnormal/ Functional Abnormal/ Non-Functional (see comments) Not Tested Comments:   AROM [x] [] [] []    PROM [] [] [] [x]    Strength [x] [] [] [] Grossly 4-/5 in BLE   Balance [x] [] [] []    Posture [x] [] [] []    Sensation [x] [] [] []    Coordination [] [] [] [x]    Tone [] [] [] [x]    Edema [] [] [] []    Activity Tolerance [x] [] [] []     [] [] [] []      COGNITION/  PERCEPTION: Intact Impaired   (see comments) Comments:   Orientation [x] []    Vision [] [x] L eye blindness   Hearing [x] []    Command Following [x] []    Safety Awareness [x] []     [] []      MOBILITY: I Mod I S SBA CGA Min Mod Max Total  NT x2 Comments:   Bed Mobility    Rolling [] [] [] [] [] [] [] [] [] [x] []    Supine to Sit [] [] [] [x] [] [] [] [] [] [] []    Scooting [] [] [] [x] [] [] [] [] [] [] []    Sit to Supine [] [] [] [] [] [] [] [] [] [x] []    Transfers    Sit to Stand [] [] [] [x] [] [] [] [] [] [] []    Bed to Chair [] [] [] [x] [] [] [] [] [] [] []    Stand to Sit [] [] [] [x] [] [] [] [] [] [] []    I=Independent, Mod I=Modified Independent, S=Supervision, SBA=Standby Assistance, CGA=Contact Guard Assistance,   Min=Minimal Assistance, Mod=Moderate Assistance, Max=Maximal Assistance, Total=Total Assistance, NT=Not Tested  GAIT: I Mod I S SBA CGA Min Mod Max Total  NT x2 Comments:   Level of Assistance [] [] [] [x] [] [] [] [] [] [] []    Distance 250'    DME None    Gait Quality Decreased gait speed, decreased B step length, narrow NOE    Weightbearing Status N/A     I=Independent, Mod I=Modified Independent, S=Supervision, SBA=Standby Assistance, CGA=Contact Guard Assistance,   Min=Minimal Assistance, Mod=Moderate Assistance, Max=Maximal Assistance, Total=Total Assistance, NT=Not Tested    [address] [facility] [address] [id number] AM-[facility] Form       How much difficulty does the patient currently have. .. Unable A Lot A Little None   1. Turning over in bed (including adjusting bedclothes, sheets and blankets)? [] 1   [] 2   [] 3   [x] 4   2. Sitting down on and standing up from a chair with arms ( e.g., wheelchair, bedside commode, etc.)   [] 1   [] 2   [] 3   [x] 4   3. Moving from lying on back to sitting on the side of the bed? [] 1   [] 2   [] 3   [x] 4   How much help from another person does the patient currently need. .. Total A Lot A Little None   4. Moving to and from a bed to a chair (including a wheelchair)? [] 1   [] 2   [] 3   [x] 4   5. Need to walk in hospital room? [] 1   [] 2   [x] 3   [] 4   6. Climbing 3-5 steps with a railing? [] 1   [] 2   [x] 3   [] 4   © 2007, Trustees of [address] [place] [address] [address] [address], under license to [email or web address]. All rights reserved     Score:  Initial: 22 Most Recent: X (Date: -- )    Interpretation of Tool:  Represents activities that are increasingly more difficult (i.e. Bed mobility, Transfers, Gait). PLAN:   FREQUENCY/DURATION: PT Plan of Care: 2 times/week for duration of hospital stay or until stated goals are met, whichever comes first.    PROBLEM LIST:   (Skilled intervention is medically necessary to address:)  1. Decreased Activity Tolerance  2.  Decreased Strength   INTERVENTIONS PLANNED:   (Benefits and precautions of physical therapy have been discussed with the patient.)  1. Therapeutic Activity  2. Therapeutic Exercise/HEP  3. Neuromuscular Re-education  4. Gait Training  5. Manual Therapy  6.  Education     TREATMENT:     EVALUATION: Low Complexity : (Untimed Charge)    AFTER TREATMENT POSITION/PRECAUTIONS:  Chair and Needs within reach    INTERDISCIPLINARY COLLABORATION:  RN/PCT and PT/PTA    TOTAL TREATMENT DURATION:  PT Patient Time In/Time Out  Time In: 0952  Time Out: [address], PT

## 2021-02-07 NOTE — PROGRESS NOTES
2021 9:21 AM    Admit Date: 2021        Subjective:     Cristhian Kebede has [place] palsy and yesterday had more left facial numbness Cose S called stat CT OK no weakness any where else OK today No rapid HR the  entire time. Objective:      Visit Vitals  BP (!) 179/79 (BP 1 Location: Right upper arm, BP Patient Position: Supine)   Pulse 61   Temp 98 °F (36.7 °C)   Resp 20   Ht 5' 2\" (1.575 m)   Wt 148 lb 4.8 oz (67.3 kg)   SpO2 92%   BMI 27.12 kg/m²       Physical Exam:  Heart: regular rate and rhythm    Data Review:   Labs:    Recent Results (from the past 24 hour(s))   GLUCOSE, POC    Collection Time: 21  6:11 PM   Result Value Ref Range    Glucose (POC) 122 (H) 65 - 932 mg/dL   METABOLIC PANEL, BASIC    Collection Time: 21  7:14 PM   Result Value Ref Range    Sodium 141 136 - 145 mmol/L    Potassium 3.5 3.5 - 5.1 mmol/L    Chloride 108 (H) 98 - 107 mmol/L    CO2 28 21 - 32 mmol/L    Anion gap 5 (L) 7 - 16 mmol/L    Glucose 118 (H) 65 - 100 mg/dL    BUN 10 8 - 23 MG/DL    Creatinine 0.85 0.6 - 1.0 MG/DL    GFR est AA >60 >60 ml/min/1.73m2    GFR est non-AA >60 >60 ml/min/1.73m2    Calcium 8.5 8.3 - 10.4 MG/DL   CBC WITH AUTOMATED DIFF    Collection Time: 21  7:14 PM   Result Value Ref Range    WBC 9.0 4.3 - 11.1 K/uL    RBC 4.16 4.05 - 5.2 M/uL    HGB 11.6 (L) 11.7 - 15.4 g/dL    HCT 36.0 35.8 - 46.3 %    MCV 86.5 79.6 - 97.8 FL    MCH 27.9 26.1 - 32.9 PG    MCHC 32.2 31.4 - 35.0 g/dL    RDW 13.8 11.9 - 14.6 %    PLATELET 305 266 - 895 K/uL    MPV 11.1 9.4 - 12.3 FL    ABSOLUTE NRBC 0.00 0.0 - 0.2 K/uL    DF AUTOMATED      NEUTROPHILS 57 43 - 78 %    LYMPHOCYTES 31 13 - 44 %    MONOCYTES 9 4.0 - 12.0 %    EOSINOPHILS 3 0.5 - 7.8 %    BASOPHILS 1 0.0 - 2.0 %    IMMATURE GRANULOCYTES 0 0.0 - 5.0 %    ABS. NEUTROPHILS 5.1 1.7 - 8.2 K/UL    ABS. LYMPHOCYTES 2.8 0.5 - 4.6 K/UL    ABS. MONOCYTES 0.8 0.1 - 1.3 K/UL    ABS. EOSINOPHILS 0.3 0.0 - 0.8 K/UL    ABS.  BASOPHILS 0.1 0.0 - 0.2 K/UL    ABS. IMM.  GRANS. 0.0 0.0 - 0.5 K/UL   CK    Collection Time: 02/06/21  7:14 PM   Result Value Ref Range    CK 60 21 - 215 U/L   TROPONIN-HIGH SENSITIVITY    Collection Time: 02/06/21  7:14 PM   Result Value Ref Range    Troponin-High Sensitivity 29.1 (H) 0 - 14 pg/mL   T4, FREE    Collection Time: 02/06/21  8:41 PM   Result Value Ref Range    T4, Free 1.4 0.78 - 1.46 NG/DL   T3 TOTAL    Collection Time: 02/06/21  8:41 PM   Result Value Ref Range    T3, total 1.06 0.60 - 1.81 ng/mL   DRUG SCREEN, URINE    Collection Time: 02/06/21 11:53 PM   Result Value Ref Range    PCP(PHENCYCLIDINE) Negative      BENZODIAZEPINES Negative      COCAINE Negative      AMPHETAMINES Negative      METHADONE Negative      THC (TH-CANNABINOL) Negative      OPIATES Positive      BARBITURATES Negative     LIPID PANEL    Collection Time: 02/07/21  3:56 AM   Result Value Ref Range    LIPID PROFILE          Cholesterol, total 136 <200 MG/DL    Triglyceride 98 35 - 150 MG/DL    HDL Cholesterol 53 40 - 60 MG/DL    LDL, calculated 63.4 <100 MG/DL    VLDL, calculated 19.6 6.0 - 23.0 MG/DL    CHOL/HDL Ratio 2.6     HEMOGLOBIN A1C WITH EAG    Collection Time: 02/07/21  3:56 AM   Result Value Ref Range    Hemoglobin A1c 5.4 4.20 - 6.30 %    Est. average glucose 108 mg/dL       Telemetry: normal sinus rhythm      Radiology:CT^ head neg          Assessment:     Patient Active Problem List    Diagnosis Date Noted    Coronary artery disease stable on recent cath 06/23/2009     Priority: 1 - One    Mixed hyperlipidemia 06/23/2009     Priority: 2 - Two    Facial droop No arm weakness has bells palsy MRI ordered 02/06/2021    Paresthesia 02/06/2021    Hypokalemia 02/06/2021    Atrial tachycardia (HCC) no more episodes on po amio 02/05/2021    Essential hypertension BP elevated increase meds 03/27/2018    H/O heart artery stent 03/27/2018    LBBB (left bundle branch block) 03/27/2018    Hx of varicose vein ligation and stripping 05/16/2016  Varicose vein of leg     Bell's palsy 12/07/2015    Osteoporosis   NOS 12/07/2015    Macular degeneration (senile), unspecified 12/07/2015    Osteoarthrosis involving multiple sites but not generalized 12/07/2015    Cataract  NOS 12/07/2015    GERD (gastroesophageal reflux disease) 03/20/2014       Plan:   MRI Can remove zio holter

## 2021-02-07 NOTE — PROGRESS NOTES
Notified Wendy Driver NP that pt has an external monitor placed by cardiology that needs to be taken off prior to MRI.  NP stated they will evaluate it in AM.

## 2021-02-08 VITALS
DIASTOLIC BLOOD PRESSURE: 63 MMHG | WEIGHT: 148 LBS | HEIGHT: 62 IN | HEART RATE: 63 BPM | BODY MASS INDEX: 27.23 KG/M2 | RESPIRATION RATE: 16 BRPM | OXYGEN SATURATION: 94 % | SYSTOLIC BLOOD PRESSURE: 139 MMHG | TEMPERATURE: 98.4 F

## 2021-02-08 PROCEDURE — 74011250637 HC RX REV CODE- 250/637: Performed by: INTERNAL MEDICINE

## 2021-02-08 PROCEDURE — 74011250637 HC RX REV CODE- 250/637: Performed by: NURSE PRACTITIONER

## 2021-02-08 PROCEDURE — 99218 HC RM OBSERVATION: CPT

## 2021-02-08 PROCEDURE — 99238 HOSP IP/OBS DSCHRG MGMT 30/<: CPT | Performed by: INTERNAL MEDICINE

## 2021-02-08 PROCEDURE — 65270000029 HC RM PRIVATE

## 2021-02-08 RX ORDER — LOSARTAN POTASSIUM 100 MG/1
100 TABLET ORAL DAILY
Qty: 30 TAB | Refills: 6 | Status: SHIPPED | OUTPATIENT
Start: 2021-02-08 | End: 2021-09-07 | Stop reason: SDUPTHER

## 2021-02-08 RX ORDER — AMIODARONE HYDROCHLORIDE 200 MG/1
TABLET ORAL
Qty: 72 TAB | Refills: 0 | Status: SHIPPED | OUTPATIENT
Start: 2021-02-08 | End: 2021-02-08 | Stop reason: SDUPTHER

## 2021-02-08 RX ORDER — LEVOTHYROXINE SODIUM 75 UG/1
75 TABLET ORAL
Qty: 30 TAB | Refills: 0 | Status: SHIPPED | OUTPATIENT
Start: 2021-02-09 | End: 2021-03-15 | Stop reason: ALTCHOICE

## 2021-02-08 RX ORDER — AMIODARONE HYDROCHLORIDE 200 MG/1
TABLET ORAL
Qty: 72 TAB | Refills: 0 | Status: SHIPPED | OUTPATIENT
Start: 2021-02-08 | End: 2021-02-26 | Stop reason: SDUPTHER

## 2021-02-08 RX ORDER — DOXYCYCLINE 100 MG/1
100 CAPSULE ORAL EVERY 12 HOURS
Qty: 10 CAP | Refills: 0 | Status: SHIPPED | OUTPATIENT
Start: 2021-02-08 | End: 2021-02-13

## 2021-02-08 RX ORDER — LOSARTAN POTASSIUM 100 MG/1
100 TABLET ORAL DAILY
Qty: 30 TAB | Refills: 6 | Status: SHIPPED | OUTPATIENT
Start: 2021-02-08 | End: 2021-02-08 | Stop reason: SDUPTHER

## 2021-02-08 RX ADMIN — CARVEDILOL 6.25 MG: 6.25 TABLET, FILM COATED ORAL at 08:00

## 2021-02-08 RX ADMIN — DOXYCYCLINE HYCLATE 100 MG: 100 CAPSULE ORAL at 08:00

## 2021-02-08 RX ADMIN — AMIODARONE HYDROCHLORIDE 400 MG: 200 TABLET ORAL at 08:00

## 2021-02-08 RX ADMIN — EZETIMIBE 10 MG: 10 TABLET ORAL at 08:00

## 2021-02-08 RX ADMIN — CLOPIDOGREL BISULFATE 75 MG: 75 TABLET ORAL at 08:00

## 2021-02-08 RX ADMIN — ACETAMINOPHEN 650 MG: 325 TABLET ORAL at 08:08

## 2021-02-08 RX ADMIN — Medication 10 ML: at 06:05

## 2021-02-08 RX ADMIN — LOSARTAN POTASSIUM 100 MG: 50 TABLET, FILM COATED ORAL at 08:00

## 2021-02-08 RX ADMIN — ASPIRIN 81 MG: 81 TABLET, CHEWABLE ORAL at 08:00

## 2021-02-08 RX ADMIN — LEVOTHYROXINE SODIUM 75 MCG: 0.07 TABLET ORAL at 06:06

## 2021-02-08 RX ADMIN — PANTOPRAZOLE SODIUM 40 MG: 40 TABLET, DELAYED RELEASE ORAL at 06:06

## 2021-02-08 NOTE — ROUTINE PROCESS
Bedside and Verbal shift change report given to self and Mateusz Mccauley RN (oncoming nurse) by Noe Roa RN (offgoing nurse). Report included the following information SBAR, Kardex, Intake/Output, MAR, Recent Results and Cardiac Rhythm SB. Dual NIH completed at bedside.

## 2021-02-08 NOTE — PROGRESS NOTES
Pt is discharging home in stable condition. No d/c needs identified. Tx goals met. Care Management Interventions  PCP Verified by CM: Yes(Dr. Yunior Humphries MD.)  Mode of Transport at Discharge:  Other (see comment)(Family)  Transition of Care Consult (CM Consult): Discharge Planning  Discharge Durable Medical Equipment: No  Physical Therapy Consult: No  Occupational Therapy Consult: No  Speech Therapy Consult: No  Current Support Network: Own Home, Family Lives Nearby  Confirm Follow Up Transport: Family  The Plan for Transition of Care is Related to the Following Treatment Goals : Return to baseline  The Patient and/or Patient Representative was Provided with a Choice of Provider and Agrees with the Discharge Plan?: Yes  Name of the Patient Representative Who was Provided with a Choice of Provider and Agrees with the Discharge Plan: Patient  Freedom of Choice List was Provided with Basic Dialogue that Supports the Patient's Individualized Plan of Care/Goals, Treatment Preferences and Shares the Quality Data Associated with the Providers?: Yes   Resource Information Provided?: No  Discharge Location  Discharge Placement: Home

## 2021-02-08 NOTE — PROGRESS NOTES
Hospitalist Progress Note    2021  Admit Date: 2021  3:14 AM   NAME: Lupis Miles   :  1940   MRN:  [id number]   Attending: Christina Ortiz MD  PCP:  Keyla Newby MD    SUBJECTIVE:     CC/ consult reason- CODE S         Requesting- cardiology, Dr Rancho Fischer  Ms. Arabella Jo is a 67 yo female with PMH of CAD s/p C 2-3-21, NSVTACH/atrial tachycardia, HTN, HLP- statin intolerance,  [place] palsy 2017-residual left facial droop and ptosis,  who is evaluated at the request of cardiology for CODE S called due acutely worsening left facial droop and bilateral facial numbness. She states that her facial paresthesia is mostly on her nasal bridge and maxillary sinus area and has been present for several days. She has some dental sensitivity as well. She has tried nasal sprays. She denies focal limb weakness, change in speech, no change in taste or smell. She has some dyspnea and edema. No chest pain. She is unaware of prior thyroid issues, TSH elevated. She just started amiodarone this admit. She is chronically constipated and admits to fatigue. Denies skin changes.         CODE S called, s/p STAT CT head without acute issues. S/p tele neuro consult- I spoke with neurology who recommends MRI brain and MRA head/ neck. She is already on asa/plavix and intolerant to  Statins and takes zetia.      Interval History (): patient examined at bedside. No acute overnight events. NO further symptoms including numbness/tingling in her face. No headaches or blurry vision.      Review of Systems negative with exception of pertinent positives noted above  PHYSICAL EXAM     Visit Vitals  /63   Pulse 63   Temp 98.2 °F (36.8 °C)   Resp 16   Ht 5' 2\" (1.575 m)   Wt 67.1 kg (148 lb)   SpO2 92%   BMI 27.07 kg/m²      Temp (24hrs), Av.2 °F (36.8 °C), Min:97.5 °F (36.4 °C), Max:99.1 °F (37.3 °C)    Oxygen Therapy  O2 Sat (%): 92 % (21 0426)  Pulse via Oximetry: 64 beats per minute (21 0807)  O2 Device: Room air (02/08/21 0802)  O2 Flow Rate (L/min): 2 l/min (02/05/21 1619)    Intake/Output Summary (Last 24 hours) at 2/8/2021 0816  Last data filed at 2/8/2021 0802  Gross per 24 hour   Intake 880 ml   Output 800 ml   Net 80 ml      General: No acute distress, pleasant   Lungs:  CTA Bilaterally. Heart:  Regular rate and rhythm,  No murmur, rub, or gallop  Abdomen: Soft, Non distended, Non tender, Positive bowel sounds  Extremities: No cyanosis, clubbing or edema  Neurologic:  CN II thru XII intact b/l. +5/5 muscle strength and sensation to light touch intact throughout. ASSESSMENT      Active Hospital Problems    Diagnosis Date Noted    Coronary artery disease 06/23/2009     Priority: 1 - One    Mixed hyperlipidemia 06/23/2009     Priority: 2 - Two    Facial droop 02/06/2021    Paresthesia 02/06/2021    Hypokalemia 02/06/2021    Atrial tachycardia ([address] 02/05/2021    Essential hypertension 03/27/2018    LBBB (left bundle branch block) 03/27/2018    H/O heart artery stent 03/27/2018    Bell's palsy 12/07/2015    GERD (gastroesophageal reflux disease) 03/20/2014     Plan:    # Stroke-like symptoms  - STAT CT head negative for acute intracranial process  - MRI/MRA head/neck unrevealing  - cleared by speech therapy  - continue ASA and Plavix  - BP goal is <130/80  - TTE unrevealing, no shunts  - patient intolerant of statins, continue ezetimibe     # Hypothyroidism  - started on levothyroxine 75 mcg  - needs recheck TSH in 4-6 weeks  - started on amiodarone by primary    # Atrial tachycardia, CAD, HTN  - management by cardiology    Ppx: SCDs for VTE    Thank you for this consult. Hospitalist will sign off. Please page/call with any questions or concerns. Discussed with patient at bedside. All questions answered.      Signed By: Carola Maher DO     February 8, 2021

## 2021-02-08 NOTE — ROUTINE PROCESS
Verbal bedside report given to Farhan Sanchez and William Rosenberg, oncoming RN. Patient's situation, background, assessment and recommendations provided. Opportunity for questions provided. Oncoming RN assumed care of patient. NIH assessment completed during shift change.  NIH=1

## 2021-02-08 NOTE — ROUTINE PROCESS
Discharge instructions reviewed with patient. Prescriptions given for amio, losartan, doxycycline, and synthroid and med info sheets provided for all new medications. Opportunity for questions provided. Patient voiced understanding of all discharge instructions. IV removed. Heart monitor returned to monitor room. Pt waiting on son in law to pick her up. Phone number of Tele floor provided in case daughter has further questions.

## 2021-02-08 NOTE — PROGRESS NOTES
[facility] CARDIOLOGY PROGRESS NOTE           2/8/2021 7:22 AM    Admit Date: 2/5/2021    Admit Diagnosis: Atrial tachycardia ([facility] [address] [I47.1]    Assessment:   Principal Problem:    Atrial tachycardia ([address] (2/5/2021)    Active Problems:    Coronary artery disease (6/23/2009)      Mixed hyperlipidemia (6/23/2009)      GERD (gastroesophageal reflux disease) (3/20/2014)      Bell's palsy (12/7/2015)      Essential hypertension (3/27/2018)      H/O heart artery stent (3/27/2018)      LBBB (left bundle branch block) (3/27/2018)      Facial droop (2/6/2021)      Paresthesia (2/6/2021)      Hypokalemia (2/6/2021)        Plan:   1. AT - stable on medical therapy. Continue amiodarone. Consider pace/ablate if worsens, outpt follow up. 2. CAD - continue DAPT. 3. Recent Bell's Palsy/sinus infection - CODE S called on 2/7 and all imaging stable. Symptoms have improved. Appreciate neuro/IM recs. 4. HTN - slightly elevated today. Given recent CODE S will err on side of permissive HTN in order to not further exacerbate her neurologic issues. Will continue outpt mgmt. 5. Dispo - anticipate 24-48 hours. Thank you for allowing me to participate in the electrophysiologic care of this most pleasant patient. Please feel free to contact me if there are any questions or concerns. Magui Cota.  MD Casandra, MS  Clinical Cardiac Electrophysiology  [facility] Cardiology    Subjective:   No complaints this AM, no chest pain or shortness of breath    Interval History: (History of pertinent interval events obtained from nursing staff)    ROS:  GEN:  No fever or chills  Cardiovascular:  As noted above  Pulmonary:  As noted above  Neuro:  No new focal motor or sensory loss      Objective:     Vitals:    02/08/21 0000 02/08/21 0028 02/08/21 0400 02/08/21 0426   BP:  (!) 158/71  (!) 160/72   Pulse: (!) 58 (!) 58 (!) 56 (!) 57   Resp:  16  16   Temp:  98 °F (36.7 °C)  98.2 °F (36.8 °C)   SpO2:  93%  92%   Weight:    148 lb (67.1 kg)   Height:           Physical Exam:  Caffie Rein, Well Nourished, No Acute Distress, Alert & Oriented x 3, appropriate mood. Neck- supple, no JVD  CV- regular rate and rhythm no MRG  Lung- clear bilaterally  Abd- soft, nontender, nondistended  Ext- no edema bilaterally.   Skin- warm and dry    Current Facility-Administered Medications   Medication Dose Route Frequency    losartan (COZAAR) tablet 100 mg  100 mg Oral DAILY    senna-docusate (PERICOLACE) 8.6-50 mg per tablet 1 Tab  1 Tab Oral DAILY PRN    amiodarone (CORDARONE) tablet 400 mg  400 mg Oral Q12H    sodium chloride (NS) flush 5-40 mL  5-40 mL IntraVENous Q8H    sodium chloride (NS) flush 5-40 mL  5-40 mL IntraVENous PRN    fluticasone propionate (FLONASE) 50 mcg/actuation nasal spray 2 Spray  2 Spray Both Nostrils DAILY    doxycycline (VIBRAMYCIN) capsule 100 mg  100 mg Oral Q12H    levothyroxine (SYNTHROID) tablet 75 mcg  75 mcg Oral ACB    aspirin chewable tablet 81 mg  81 mg Oral DAILY    carvediloL (COREG) tablet 6.25 mg  6.25 mg Oral BID WITH MEALS    clopidogreL (PLAVIX) tablet 75 mg  75 mg Oral DAILY    ezetimibe (ZETIA) tablet 10 mg  10 mg Oral DAILY    pantoprazole (PROTONIX) tablet 40 mg  40 mg Oral ACB    zolpidem (AMBIEN) tablet 10 mg  10 mg Oral QHS PRN    alum-mag hydroxide-simeth (MYLANTA) oral suspension 30 mL  30 mL Oral Q4H PRN    sodium chloride (NS) flush 5-40 mL  5-40 mL IntraVENous Q8H    sodium chloride (NS) flush 5-40 mL  5-40 mL IntraVENous PRN    nitroglycerin (NITROBID) 2 % ointment 1 Inch  1 Inch Topical Q6H    nitroglycerin (NITROSTAT) tablet 0.4 mg  0.4 mg SubLINGual Q5MIN PRN    morphine injection 2 mg  2 mg IntraVENous Q4H PRN    acetaminophen (TYLENOL) tablet 650 mg  650 mg Oral Q4H PRN    HYDROcodone-acetaminophen (NORCO) 5-325 mg per tablet 1 Tab  1 Tab Oral Q4H PRN    ondansetron (ZOFRAN) injection 4 mg  4 mg IntraVENous Q4H PRN       Data Review: No results found for this or any previous visit (from the past 24 hour(s)). EKG:  (EKG has been independently visualized by me with interpretation below): NSR, normal axis, no ischemia.

## 2021-02-08 NOTE — DISCHARGE SUMMARY
[facility] Cardiology Discharge Summary     Patient ID:  Cristhian Kebede  [id number]  36 y.o.  1940    Admit date: 2021    Discharge date and time:  2021    Admitting Physician: Jez Butcher MD     Discharge Physician: Sunni Baig PA-C/Dr. Bryant Wynn    Admission Diagnoses: Atrial tachycardia [facility]) [I47.1]    Discharge Diagnoses:   Patient Active Problem List    Diagnosis Date Noted    Coronary artery disease 2009     Priority: 1 - One    Mixed hyperlipidemia 2009     Priority: 2 - Two    Facial droop 2021    Paresthesia 2021    Hypokalemia 2021    Atrial tachycardia ([address] 2021    Essential hypertension 2018    H/O heart artery stent 2018    LBBB (left bundle branch block) 2018    Hx of varicose vein ligation and stripping 2016    Varicose vein of leg     Bell's palsy 2015    Osteoporosis   NOS 2015    Macular degeneration (senile), unspecified 2015    Osteoarthrosis involving multiple sites but not generalized 2015    Cataract  NOS 2015    GERD (gastroesophageal reflux disease) 2014       Procedures this admission:  EchoCardiogram  CT heat, MRI head, MRA head/neck  Consults: Neurology and [facility] Course: Pt is a 68 y. o. WF with h/o CAD, GERD, HTN, HLD and [place] palsy in  with residual left facial droop and ptosis who presented to the [facility] ED with recurrent tachycardia. Patient was admitted on 2/3 with tachycardia, shortness of breath and chest pain. EKG that admission was concerning for NSVT. 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 EP reviewed the EKG/rhythm strips with recommendations to start patient on Multaq for treatment of atrial tachycardia, however, patient's insurance would not cover this.  Therefore, patient was discharged home on Coreg (dose lowered at discharge due to some bradycardia with rate as low as 30s) and scheduled for outpatient monitoring. Pt returned to ED stating woke up with palpitations, chest pressure, shortness of breath and dizziness. Upon arrival to the ED, patient was in NSR with rate in the 80s. Shortly after arrival, patient noted to be tachycardic with rates as high as 190. She was given 150 mg IV amiodarone x 2 and 2.5 IV lopressor and converted to NSR with rates in the 70s. She was admitted on IV Amiodarone and transitioned to PO. On 2/6, a CODE S was called due acutely worsening left facial droop and bilateral facial numbness. She stated that her facial paresthesia is mostly on her nasal bridge and maxillary sinus area and has been present for several days. She has some dental sensitivity as well. She denied focal limb weakness, change in speech or change in taste or smell. STAT CT head without acute changes. Tele neuro consult completed with recommendations for MRI brain and MRA head/neck. She is already on asa/plavix and intolerant to statins, she takes zetia. Echo showed EF 60-65%, no regional wall motion abnormalities, LAE, no right-to-left shunt, with provocative maneuvers to increase right atrial pressure, mild to moderate mitral regurgitation, mild tricuspid regurgitation and pulmonary veins were dilated. MRI showed no acute infarction and minimal chronic small vessel disease. MRA head and neck showed no hemodynamically significant internal carotid artery stenosis identified. Hospitalist started Doxycycline for sinusitis and Synthroid for elevated TSH. Hospitalist recommended f/u with PCP and repeat TSH in 4-6 weeks. Pt was up feeling well without any complaints of CP, SOB or palpitations. Pt was seen and examined by Dr. Vernon Van and determined stable and ready for discharge. Will taper Amiodarone. DISPOSITION: The patient is being discharged home in stable condition on a low saturated fat, low cholesterol and low salt diet.  Pt is instructed to advance activities as tolerated limited to fatigue or shortness of breath. Pt is instructed to call office or return to ER for immediate evaluation of any shortness of breath or chest pain. Follow up with [facility] Dr. Sam Otto on March 15th at 4:30 PM  Follow up with PCP in 1-2 weeks      Discharge Exam:   Visit Vitals  BP (!) 160/72 (BP 1 Location: Right upper arm, BP Patient Position: At rest)   Pulse (!) 57   Temp 98.2 °F (36.8 °C)   Resp 16   Ht 5' 2\" (1.575 m)   Wt 67.1 kg (148 lb)   SpO2 92%   BMI 27.07 kg/m²   Pt has been seen by Dr. Rosy Collazo: see his progress note for exam details. Patient Instructions:   Current Discharge Medication List      START taking these medications    Details   amiodarone (CORDARONE) 200 mg tablet Take 2 Tabs by mouth every twelve (12) hours for 7 days, THEN 1 Tab every twelve (12) hours for 7 days, THEN 1 Tab daily for 30 days. Qty: 72 Tab, Refills: 0      levothyroxine (SYNTHROID) 75 mcg tablet Take 1 Tab by mouth Daily (before breakfast). Qty: 30 Tab, Refills: 0      doxycycline (VIBRAMYCIN) 100 mg capsule Take 1 Cap by mouth every twelve (12) hours for 10 doses. Qty: 10 Cap, Refills: 0         CONTINUE these medications which have CHANGED    Details   losartan (COZAAR) 100 mg tablet Take 1 Tab by mouth daily. Qty: 30 Tab, Refills: 6         CONTINUE these medications which have NOT CHANGED    Details   aspirin 81 mg chewable tablet Take 1 Tab by mouth daily. Qty:        omeprazole (PRILOSEC) 40 mg capsule TAKE ONE CAPSULE BY MOUTH ONCE DAILY  Qty: 60 Cap, Refills: 0    Associated Diagnoses: Gastroesophageal reflux disease without esophagitis      carvediloL (COREG) 6.25 mg tablet Take 1 Tab by mouth two (2) times daily (with meals).   Qty: 180 Tab, Refills: 3    Comments: Needs appointment for further refills      clopidogreL (PLAVIX) 75 mg tab TAKE ONE TABLET BY MOUTH ONCE DAILY  Qty: 90 Tab, Refills: 3    Associated Diagnoses: Essential hypertension ezetimibe (ZETIA) 10 mg tablet Take 1 Tab by mouth daily. Qty: 90 Tab, Refills: 3      zolpidem (Ambien) 10 mg tablet Take 10 mg by mouth nightly as needed for Sleep.      calcitRIOL (ROCALTROL) 0.5 mcg capsule Take 1 Cap by mouth daily. Qty: 30 Cap, Refills: 5    Associated Diagnoses: Vitamin D deficiency      fluticasone (FLONASE) 50 mcg/actuation nasal spray 2 Sprays by Both Nostrils route daily. Qty: 1 Bottle, Refills: 2    Associated Diagnoses: Seasonal allergic rhinitis      Bifidobacterium Infantis (ALIGN) 4 mg cap Take 1 Cap by mouth daily.   Qty: 7 Cap, Refills: 0    Associated Diagnoses: Abdominal bloating             Signed:  Amada Castro PA-C  2/8/2021  8:00 AM

## 2021-02-08 NOTE — PROGRESS NOTES
02/07/21 1927   NIH Stroke Scale   Interval Other (comment)   LOC 0   LOC Questions 0   LOC Commands 0   Best Gaze 0   Visual 0   Facial Palsy 1   Motor Right Arm 0   Motor Left Arm 0   Motor Right Leg 0   Motor Left Leg 0   Limb Ataxia 0   Sensory 0   Best Language 0   Dysarthria 0   Extinction and Inattention 0   Total 1

## 2021-02-08 NOTE — ROUTINE PROCESS
Verbal bedside report received from Parke, RN to [employer] and this RN. Assumed care of patient. NIH assessment completed.

## 2021-02-08 NOTE — DISCHARGE INSTRUCTIONS
Patient Education     Patient Education        Supraventricular Tachycardia: Care Instructions  Your Care Instructions     Having supraventricular tachycardia (SVT) means that from time to time your heart beats abnormally fast. This fast rhythm is caused by changes in the electrical system of your heart. You may feel a fluttering in your chest (palpitations) and have a fast pulse. When your heart is beating fast, you may feel anxious and lightheaded, be short of breath, and feel discomfort in the chest.  Your doctor may prescribe medicines to help slow down your heartbeat. Your doctor may also suggest you try vagal maneuvers when having an episode of SVT. These are things, like bearing down, that might help slow your heart rate. Bearing down means that you try to breathe out with your stomach muscles but you don't let air out of your nose or mouth. Your doctor can show you how to do vagal maneuvers. He or she may suggest you lie down on your back to do them. In some cases, either cardioversion treatment or a procedure called catheter ablation is done to correct SVT. Your doctor may ask you to wear a small electronic device for 1 or 2 days to monitor your heart. It is called a Holter monitor. Follow-up care is a key part of your treatment and safety. Be sure to make and go to all appointments, and call your doctor if you are having problems. It's also a good idea to know your test results and keep a list of the medicines you take. How can you care for yourself at home? · Be safe with medicines. Take your medicines exactly as prescribed. Call your doctor if you think you are having a problem with your medicine. You will get more details on the specific medicines your doctor prescribes. · If your doctor showed you how to do vagal maneuvers, try them when you have an episode. These maneuvers include bearing down or putting an ice-cold, wet towel on your face.   · Monitor your condition by keeping a diary of your SVT episodes. Bring this to your doctor appointments. ? Write down how fast or slow your heart was beating. To count your heart rate:  § Gently place 2 fingers of your hand on the inside of your other wrist, below your thumb. § Count the beats for 30 seconds. § Then, double the result to get the number of beats per minute. ? Write down if your heart rhythm was regular or irregular. ? Write down the symptoms you had.  ? Write down the time of day your symptoms occurred. ? Write down how long your symptoms lasted. ? Write down what you were doing when your symptoms started. ? Write down what may have helped your symptoms go away. · If they trigger episodes, limit or avoid alcohol or drinks with caffeine. · Do not use over-the-counter decongestants, herbal remedies, diet pills, or \"pep\" pills, which often contain stimulants. · Do not use illegal drugs, such as cocaine, ecstasy, or methamphetamine, which can speed up your heart's rhythm. · Do not smoke. Smoking can make this condition worse. If you need help quitting, talk to your doctor about stop-smoking programs and medicines. These can increase your chances of quitting for good. · Be alert for new or worsening symptoms, such as shortness of breath, pounding of your heart, or unusual tiredness. If new symptoms develop or your symptoms become worse, call your doctor. When should you call for help? Call 911 anytime you think you may need emergency care. For example, call if:    · You passed out (lost consciousness).     · You are short of breath. Call your doctor now or seek immediate medical care if:    · You have a fast heartbeat.     · You are dizzy or lightheaded, or feel like you may faint. Watch closely for changes in your health, and be sure to contact your doctor if:    · You do not get better as expected. Where can you learn more?   Go to http://www.gray.com/  Enter G244 in the search box to learn more about \"Supraventricular Tachycardia: Care Instructions. \"  Current as of: December 16, 2019               Content Version: 12.6  © 2409-6639 [employer]. Care instructions adapted under license by [employer] (which disclaims liability or warranty for this information). If you have questions about a medical condition or this instruction, always ask your healthcare professional. [address] any warranty or liability for your use of this information. Bell's Palsy: Care Instructions  Your Care Instructions     Bell's palsy is paralysis or weakness of the muscles on one side of the face. Often people with Bell's palsy have a droop on one side of the mouth and have trouble completely shutting the eye on the same side. Bell's palsy can also interfere with the sense of taste. These things happen when a nerve in your face becomes inflamed. Bell's palsy is not caused by a stroke. The cause of the nerve inflammation is not known. But some experts think that a virus may cause it. Because of this, doctors sometimes prescribe antiviral medicine to treat it. You also may get medicine to reduce swelling. Bell's palsy usually gets better on its own in a few weeks or months. Follow-up care is a key part of your treatment and safety. Be sure to make and go to all appointments, and call your doctor if you are having problems. It's also a good idea to know your test results and keep a list of the medicines you take. How can you care for yourself at home? · Take your medicines exactly as prescribed. Call your doctor if you think you are having a problem with your medicine. You will get more details on the specific medicines your doctor prescribes. · Use artificial tears or ointment if your eyes are too dry. Bell's palsy can make your lower eyelid droop, causing a dry eye. · If you cannot completely close your eye, consider using an eye patch while you sleep.   · Help yourself blink by using your finger to close and open your eyelid. This may help keep your eye moist.  · Wear glasses or goggles to keep dust and dirt out of your eye. · As feeling comes back to your face, massage your forehead, cheeks, and lips. Massage may make the muscles in your face stronger. · Brush and floss your teeth often to help prevent tooth decay. Bell's palsy can dry up the spit on one side of your mouth. This increases the risk of tooth decay. When should you call for help? Call 911 anytime you think you may need emergency care. For example, call if:    · You have symptoms of a stroke. These may include:  ? Sudden numbness, tingling, weakness, or loss of movement in your face, arm, or leg, especially on only one side of your body. ? Sudden vision changes. ? Sudden trouble speaking. ? Sudden confusion or trouble understanding simple statements. ? Sudden problems with walking or balance. ? A sudden, severe headache that is different from past headaches. Call your doctor now or seek immediate medical care if:    · You have numbness or weakness that spreads beyond one side of your face.     · You have a skin rash or eye pain or redness, or light bothers your eyes.     · You have a new or worse headache. Watch closely for changes in your health, and be sure to contact your doctor if:    · You do not get better as expected. Where can you learn more? Go to http://www.gray.com/  Enter P168 in the search box to learn more about \"Bell's Palsy: Care Instructions. \"  Current as of: November 20, 2019               Content Version: 12.6  © 4734-1853 [employer]. Care instructions adapted under license by [employer] (which disclaims liability or warranty for this information).  If you have questions about a medical condition or this instruction, always ask your healthcare professional. [address] any warranty or liability for your use of this information.

## 2021-02-08 NOTE — PROGRESS NOTES
Problem: Falls - Risk of  Goal: *Absence of Falls  Description: Document Khalif Davis Fall Risk and appropriate interventions in the flowsheet. Outcome: Resolved/Met     Problem: Patient Education: Go to Patient Education Activity  Goal: Patient/Family Education  Outcome: Resolved/Met     Problem: Pressure Injury - Risk of  Goal: *Prevention of pressure injury  Description: Document Paramjit Scale and appropriate interventions in the flowsheet.   Outcome: Resolved/Met     Problem: Patient Education: Go to Patient Education Activity  Goal: Patient/Family Education  Outcome: Resolved/Met     Problem: Patient Education: Go to Patient Education Activity  Goal: Patient/Family Education  Outcome: Resolved/Met     Problem: TIA/CVA Stroke: 0-24 hours  Goal: Off Pathway (Use only if patient is Off Pathway)  Outcome: Resolved/Met  Goal: Activity/Safety  Outcome: Resolved/Met  Goal: Consults, if ordered  Outcome: Resolved/Met  Goal: Diagnostic Test/Procedures  Outcome: Resolved/Met  Goal: Nutrition/Diet  Outcome: Resolved/Met  Goal: Discharge Planning  Outcome: Resolved/Met  Goal: Medications  Outcome: Resolved/Met  Goal: Respiratory  Outcome: Resolved/Met  Goal: Treatments/Interventions/Procedures  Outcome: Resolved/Met  Goal: Minimize risk of bleeding post-thrombolytic infusion  Outcome: Resolved/Met  Goal: Monitor for complications post-thrombolytic infusion  Outcome: Resolved/Met  Goal: Psychosocial  Outcome: Resolved/Met  Goal: *Hemodynamically stable  Outcome: Resolved/Met  Goal: *Neurologically stable  Description: Absence of additional neurological deficits    Outcome: Resolved/Met  Goal: *Verbalizes anxiety and depression are reduced or absent  Outcome: Resolved/Met  Goal: *Absence of Signs of Aspiration on Current Diet  Outcome: Resolved/Met  Goal: *Absence of deep venous thrombosis signs and symptoms(Stroke Metric)  Outcome: Resolved/Met  Goal: *Ability to perform ADLs and demonstrates progressive mobility and function  Outcome: Resolved/Met  Goal: *Stroke education started(Stroke Metric)  Outcome: Resolved/Met  Goal: *Dysphagia screen performed(Stroke Metric)  Outcome: Resolved/Met  Goal: *Rehab consulted(Stroke Metric)  Outcome: Resolved/Met     Problem: TIA/CVA Stroke: Day 2 Until Discharge  Goal: Off Pathway (Use only if patient is Off Pathway)  Outcome: Resolved/Met  Goal: Activity/Safety  Outcome: Resolved/Met  Goal: Diagnostic Test/Procedures  Outcome: Resolved/Met  Goal: Nutrition/Diet  Outcome: Resolved/Met  Goal: Discharge Planning  Outcome: Resolved/Met  Goal: Medications  Outcome: Resolved/Met  Goal: Respiratory  Outcome: Resolved/Met  Goal: Treatments/Interventions/Procedures  Outcome: Resolved/Met  Goal: Psychosocial  Outcome: Resolved/Met  Goal: *Verbalizes anxiety and depression are reduced or absent  Outcome: Resolved/Met  Goal: *Absence of aspiration  Outcome: Resolved/Met  Goal: *Absence of deep venous thrombosis signs and symptoms(Stroke Metric)  Outcome: Resolved/Met  Goal: *Optimal pain control at patient's stated goal  Outcome: Resolved/Met  Goal: *Tolerating diet  Outcome: Resolved/Met  Goal: *Ability to perform ADLs and demonstrates progressive mobility and function  Outcome: Resolved/Met  Goal: *Stroke education continued(Stroke Metric)  Outcome: Resolved/Met     Problem: Ischemic Stroke: Discharge Outcomes  Goal: *Verbalizes anxiety and depression are reduced or absent  Outcome: Resolved/Met  Goal: *Verbalize understanding of risk factor modification(Stroke Metric)  Outcome: Resolved/Met  Goal: *Hemodynamically stable  Outcome: Resolved/Met  Goal: *Absence of aspiration pneumonia  Outcome: Resolved/Met  Goal: *Aware of needed dietary changes  Outcome: Resolved/Met  Goal: *Verbalize understanding of prescribed medications including anti-coagulants, anti-lipid, and/or anti-platelets(Stroke Metric)  Outcome: Resolved/Met  Goal: *Tolerating diet  Outcome: Resolved/Met  Goal: *Aware of follow-up diagnostics related to anticoagulants  Outcome: Resolved/Met  Goal: *Ability to perform ADLs and demonstrates progressive mobility and function  Outcome: Resolved/Met  Goal: *Absence of DVT(Stroke Metric)  Outcome: Resolved/Met  Goal: *Absence of aspiration  Outcome: Resolved/Met  Goal: *Optimal pain control at patient's stated goal  Outcome: Resolved/Met  Goal: *Home safety concerns addressed  Outcome: Resolved/Met  Goal: *Describes available resources and support systems  Outcome: Resolved/Met  Goal: *Verbalizes understanding of activation of EMS(911) for stroke symptoms(Stroke Metric)  Outcome: Resolved/Met  Goal: *Understands and describes signs and symptoms to report to providers(Stroke Metric)  Outcome: Resolved/Met  Goal: *Neurolgocially stable (absence of additional neurological deficits)  Outcome: Resolved/Met  Goal: *Verbalizes importance of follow-up with primary care physician(Stroke Metric)  Outcome: Resolved/Met  Goal: *Smoking cessation discussed,if applicable(Stroke Metric)  Outcome: Resolved/Met  Goal: *Depression screening completed(Stroke Metric)  Outcome: Resolved/Met     Problem: Patient Education: Go to Patient Education Activity  Goal: Patient/Family Education  Outcome: Resolved/Met     Problem: Patient Education: Go to Patient Education Activity  Goal: Patient/Family Education  Outcome: Resolved/Met

## 2021-02-23 ENCOUNTER — HOSPITAL ENCOUNTER (EMERGENCY)
Age: 81
Discharge: HOME | End: 2021-02-24
Attending: EMERGENCY MEDICINE
Payer: MEDICARE

## 2021-02-23 DIAGNOSIS — R00.2 PALPITATIONS: Primary | ICD-10-CM

## 2021-02-23 DIAGNOSIS — I10 HYPERTENSION, UNSPECIFIED TYPE: ICD-10-CM

## 2021-02-23 LAB
BASOPHILS # BLD: 0.1 K/UL (ref 0–0.2)
BASOPHILS NFR BLD: 1 % (ref 0–2)
DIFFERENTIAL METHOD BLD: NORMAL
EOSINOPHIL # BLD: 0.2 K/UL (ref 0–0.8)
EOSINOPHIL NFR BLD: 3 % (ref 0.5–7.8)
ERYTHROCYTE [DISTWIDTH] IN BLOOD BY AUTOMATED COUNT: 14.4 % (ref 11.9–14.6)
HCT VFR BLD AUTO: 39.2 % (ref 35.8–46.3)
HGB BLD-MCNC: 12.3 G/DL (ref 11.7–15.4)
IMM GRANULOCYTES # BLD AUTO: 0.1 K/UL (ref 0–0.5)
IMM GRANULOCYTES NFR BLD AUTO: 1 % (ref 0–5)
LYMPHOCYTES # BLD: 1.7 K/UL (ref 0.5–4.6)
LYMPHOCYTES NFR BLD: 21 % (ref 13–44)
MCH RBC QN AUTO: 27.7 PG (ref 26.1–32.9)
MCHC RBC AUTO-ENTMCNC: 31.4 G/DL (ref 31.4–35)
MCV RBC AUTO: 88.3 FL (ref 79.6–97.8)
MONOCYTES # BLD: 0.5 K/UL (ref 0.1–1.3)
MONOCYTES NFR BLD: 6 % (ref 4–12)
NEUTS SEG # BLD: 5.6 K/UL (ref 1.7–8.2)
NEUTS SEG NFR BLD: 69 % (ref 43–78)
NRBC # BLD: 0 K/UL (ref 0–0.2)
PLATELET # BLD AUTO: 246 K/UL (ref 150–450)
PMV BLD AUTO: 11.6 FL (ref 9.4–12.3)
RBC # BLD AUTO: 4.44 M/UL (ref 4.05–5.2)
WBC # BLD AUTO: 8.1 K/UL (ref 4.3–11.1)

## 2021-02-23 PROCEDURE — 80053 COMPREHEN METABOLIC PANEL: CPT

## 2021-02-23 PROCEDURE — 93005 ELECTROCARDIOGRAM TRACING: CPT

## 2021-02-23 PROCEDURE — 83735 ASSAY OF MAGNESIUM: CPT

## 2021-02-23 PROCEDURE — 84484 ASSAY OF TROPONIN QUANT: CPT

## 2021-02-23 PROCEDURE — 85025 COMPLETE CBC W/AUTO DIFF WBC: CPT

## 2021-02-23 PROCEDURE — 99285 EMERGENCY DEPT VISIT HI MDM: CPT

## 2021-02-23 PROCEDURE — 83880 ASSAY OF NATRIURETIC PEPTIDE: CPT

## 2021-02-24 ENCOUNTER — APPOINTMENT (OUTPATIENT)
Dept: GENERAL RADIOLOGY | Age: 81
End: 2021-02-24
Attending: EMERGENCY MEDICINE
Payer: MEDICARE

## 2021-02-24 VITALS
OXYGEN SATURATION: 95 % | HEIGHT: 62 IN | TEMPERATURE: 98.7 F | HEART RATE: 61 BPM | RESPIRATION RATE: 18 BRPM | SYSTOLIC BLOOD PRESSURE: 142 MMHG | DIASTOLIC BLOOD PRESSURE: 65 MMHG | WEIGHT: 148 LBS | BODY MASS INDEX: 27.23 KG/M2

## 2021-02-24 LAB
ALBUMIN SERPL-MCNC: 3.8 G/DL (ref 3.2–4.6)
ALBUMIN/GLOB SERPL: 1 {RATIO} (ref 1.2–3.5)
ALP SERPL-CCNC: 112 U/L (ref 50–136)
ALT SERPL-CCNC: 28 U/L (ref 12–65)
ANION GAP SERPL CALC-SCNC: 7 MMOL/L (ref 7–16)
APPEARANCE UR: CLEAR
AST SERPL-CCNC: 24 U/L (ref 15–37)
ATRIAL RATE: 60 BPM
BACTERIA URNS QL MICRO: 0 /HPF
BILIRUB SERPL-MCNC: 0.5 MG/DL (ref 0.2–1.1)
BILIRUB UR QL: NEGATIVE
BNP SERPL-MCNC: 928 PG/ML
BUN SERPL-MCNC: 11 MG/DL (ref 8–23)
CALCIUM SERPL-MCNC: 8.7 MG/DL (ref 8.3–10.4)
CALCULATED P AXIS, ECG09: 88 DEGREES
CALCULATED R AXIS, ECG10: 49 DEGREES
CALCULATED T AXIS, ECG11: 87 DEGREES
CASTS URNS QL MICRO: ABNORMAL /LPF
CHLORIDE SERPL-SCNC: 110 MMOL/L (ref 98–107)
CO2 SERPL-SCNC: 26 MMOL/L (ref 21–32)
COLOR UR: YELLOW
CREAT SERPL-MCNC: 0.7 MG/DL (ref 0.6–1)
DIAGNOSIS, 93000: NORMAL
EPI CELLS #/AREA URNS HPF: ABNORMAL /HPF
GLOBULIN SER CALC-MCNC: 3.7 G/DL (ref 2.3–3.5)
GLUCOSE SERPL-MCNC: 120 MG/DL (ref 65–100)
GLUCOSE UR STRIP.AUTO-MCNC: NEGATIVE MG/DL
HGB UR QL STRIP: NEGATIVE
KETONES UR QL STRIP.AUTO: NEGATIVE MG/DL
LEUKOCYTE ESTERASE UR QL STRIP.AUTO: ABNORMAL
MAGNESIUM SERPL-MCNC: 2.3 MG/DL (ref 1.8–2.4)
NITRITE UR QL STRIP.AUTO: NEGATIVE
P-R INTERVAL, ECG05: 162 MS
PH UR STRIP: 7.5 [PH] (ref 5–9)
POTASSIUM SERPL-SCNC: 3.7 MMOL/L (ref 3.5–5.1)
PROT SERPL-MCNC: 7.5 G/DL (ref 6.3–8.2)
PROT UR STRIP-MCNC: NEGATIVE MG/DL
Q-T INTERVAL, ECG07: 492 MS
QRS DURATION, ECG06: 156 MS
QTC CALCULATION (BEZET), ECG08: 492 MS
RBC #/AREA URNS HPF: ABNORMAL /HPF
SODIUM SERPL-SCNC: 143 MMOL/L (ref 136–145)
SP GR UR REFRACTOMETRY: 1.01 (ref 1–1.02)
TROPONIN-HIGH SENSITIVITY: 11.9 PG/ML (ref 0–14)
TROPONIN-HIGH SENSITIVITY: 27.9 PG/ML (ref 0–14)
UROBILINOGEN UR QL STRIP.AUTO: 0.2 EU/DL (ref 0.2–1)
VENTRICULAR RATE, ECG03: 60 BPM
WBC URNS QL MICRO: ABNORMAL /HPF

## 2021-02-24 PROCEDURE — 74011250637 HC RX REV CODE- 250/637: Performed by: EMERGENCY MEDICINE

## 2021-02-24 PROCEDURE — 96375 TX/PRO/DX INJ NEW DRUG ADDON: CPT

## 2021-02-24 PROCEDURE — 74011250636 HC RX REV CODE- 250/636: Performed by: EMERGENCY MEDICINE

## 2021-02-24 PROCEDURE — 81001 URINALYSIS AUTO W/SCOPE: CPT

## 2021-02-24 PROCEDURE — 84484 ASSAY OF TROPONIN QUANT: CPT

## 2021-02-24 PROCEDURE — 71045 X-RAY EXAM CHEST 1 VIEW: CPT

## 2021-02-24 PROCEDURE — 96374 THER/PROPH/DIAG INJ IV PUSH: CPT

## 2021-02-24 RX ORDER — FUROSEMIDE 10 MG/ML
40 INJECTION INTRAMUSCULAR; INTRAVENOUS
Status: COMPLETED | OUTPATIENT
Start: 2021-02-24 | End: 2021-02-24

## 2021-02-24 RX ORDER — CLONIDINE HYDROCHLORIDE 0.1 MG/1
0.2 TABLET ORAL
Status: COMPLETED | OUTPATIENT
Start: 2021-02-24 | End: 2021-02-24

## 2021-02-24 RX ORDER — ONDANSETRON 2 MG/ML
8 INJECTION INTRAMUSCULAR; INTRAVENOUS
Status: COMPLETED | OUTPATIENT
Start: 2021-02-24 | End: 2021-02-24

## 2021-02-24 RX ADMIN — ONDANSETRON 8 MG: 2 INJECTION INTRAMUSCULAR; INTRAVENOUS at 02:18

## 2021-02-24 RX ADMIN — FUROSEMIDE 40 MG: 10 INJECTION, SOLUTION INTRAVENOUS at 01:57

## 2021-02-24 RX ADMIN — CLONIDINE HYDROCHLORIDE 0.2 MG: 0.1 TABLET ORAL at 02:21

## 2021-02-24 NOTE — ED TRIAGE NOTES
Patient arrives via EMS from home with increased weakness, palpitations, indigestion. Patient recently diagnosed with afib. States this started today. Patient is alert and oriented at triage. Patient states her palpitations started around 2100.

## 2021-02-24 NOTE — ED NOTES
I have reviewed discharge instructions with the patient. The patient verbalized understanding. Patient left ED via Discharge Method: ambulatory to Home with self. Opportunity for questions and clarification provided. Patient given 0 scripts. To continue your aftercare when you leave the hospital, you may receive an automated call from our care team to check in on how you are doing. This is a free service and part of our promise to provide the best care and service to meet your aftercare needs.  If you have questions, or wish to unsubscribe from this service please call [phone]. Thank you for Choosing our [facility].

## 2021-02-24 NOTE — DISCHARGE INSTRUCTIONS
Increase amiodarone to 200 mg twice daily until seen by Dr. Oren Trotter. They should call you today tomorrow with earlier appointment. Return to the emergency department for any concerns.

## 2021-02-24 NOTE — ED PROVIDER NOTES
Frances Linton is a 68 y.o. female seen on 2/24/2021 in the [facility] EMERGENCY DEPT in room ER06/06. Chief Complaint   Patient presents with    Palpitations     HPI: 53-year-old female presented to the emergency department with complaints of intermittent fatigue and palpitations with associated generalized weakness swelling ongoing for the past couple of months. Patient has been admitted to the hospital 2 or 3 times for similar as she was diagnosed with what initially was thought to be nonsustained ventricular tachycardia. After being admitted to the hospital, cardiology (EP) determined patient to have paroxysmal A. fib with a left bundle branch and aberrancy as opposed to nonsustained VT. Patient is on high-dose amiodarone and has been tapering her amiodarone dose as per direction of cardiology. As patient has decreased her dosages of amiodarone, her symptoms of palpitations and fatigue have increased. Patient states that she also has noted some increased lower extremity swelling and chest tightness. Her chest tightness is only when she has the palpitations. Review of previous cardiology notes show the patient is having intermittent A. fib with heart rates in the 170s and 180s. Patient is supposed to follow-up with cardiology however she does not know when her follow-up appointment with Dr. Oren Trotter is going to be. She denies any other constitutional symptoms. She has no other complaints at this time. Historian: Patient/previous medical record    REVIEW OF SYSTEMS     Review of Systems   Constitutional: Positive for activity change and fatigue. Negative for chills and fever. HENT: Negative. Respiratory: Positive for chest tightness. Cardiovascular: Positive for palpitations and leg swelling. Gastrointestinal: Negative. Genitourinary: Negative. Musculoskeletal: Negative. Skin: Negative.     Neurological: Positive for weakness (generalized) and light-headedness. Hematological: Negative. Psychiatric/Behavioral: Negative. All other systems reviewed and are negative. PAST MEDICAL HISTORY     Past Medical History:   Diagnosis Date    Arrhythmia     FAST HEART BEAT    Arthritis     OSTEO    Bell's palsy 12/7/2015    Bell's palsy     Cancer ([facility] [address]     melona    Cataract  NOS 12/7/2015    Coronary artery disease 6/23/2009    GERD (gastroesophageal reflux disease)     ACID REFLUX    Hx of varicose vein ligation and stripping 5/16/2016    LEFT 30+ yrs ago    Hyperlipidemia 12/7/2015    Hypertension     Macular degeneration (senile), unspecified 12/7/2015    Nerve damage     from shingles    Osteoarthrosis involving multiple sites but not generalized 12/7/2015    Osteoporosis   NOS 12/7/2015    Varicose vein of leg      Past Surgical History:   Procedure Laterality Date    HX APPENDECTOMY      HX HEART CATHETERIZATION      with 1 stent    HX HYSTERECTOMY      HX OTHER SURGICAL      BREAST IMPLANTS, BLADDER TACT     Social History     Socioeconomic History    Marital status:      Spouse name: Not on file    Number of children: Not on file    Years of education: Not on file    Highest education level: Not on file   Tobacco Use    Smoking status: Never Smoker    Smokeless tobacco: Never Used   Substance and Sexual Activity    Alcohol use:  Yes     Alcohol/week: 17.5 standard drinks     Types: 7 Glasses of wine, 14 Cans of beer per week    Drug use: No    Sexual activity: Not Currently     Birth control/protection: Surgical   Other Topics Concern     Service No    Blood Transfusions No    Caffeine Concern No    Occupational Exposure No    Hobby Hazards No    Sleep Concern No    Stress Concern No    Weight Concern No    Special Diet No    Back Care No    Exercise Yes    Bike Helmet No    Seat Belt Yes    Self-Exams No     Prior to Admission Medications   Prescriptions Last Dose Informant Patient Reported? Taking? Bifidobacterium Infantis (ALIGN) 4 mg cap   No No   Sig: Take 1 Cap by mouth daily. amiodarone (CORDARONE) 200 mg tablet   No No   Sig: Take 2 Tabs by mouth every twelve (12) hours for 7 days, THEN 1 Tab every twelve (12) hours for 7 days, THEN 1 Tab daily for 30 days. aspirin 81 mg chewable tablet   Yes No   Sig: Take 1 Tab by mouth daily. calcitRIOL (ROCALTROL) 0.5 mcg capsule   No No   Sig: Take 1 Cap by mouth daily. carvediloL (COREG) 6.25 mg tablet   No No   Sig: Take 1 Tab by mouth two (2) times daily (with meals). clopidogreL (PLAVIX) 75 mg tab   No No   Sig: TAKE ONE TABLET BY MOUTH ONCE DAILY   ezetimibe (ZETIA) 10 mg tablet   No No   Sig: Take 1 Tab by mouth daily. fluticasone (FLONASE) 50 mcg/actuation nasal spray   No No   Si Sprays by Both Nostrils route daily. levothyroxine (SYNTHROID) 75 mcg tablet   No No   Sig: Take 1 Tab by mouth Daily (before breakfast). losartan (COZAAR) 100 mg tablet   No No   Sig: Take 1 Tab by mouth daily. omeprazole (PRILOSEC) 40 mg capsule   No No   Sig: TAKE ONE CAPSULE BY MOUTH ONCE DAILY   zolpidem (Ambien) 10 mg tablet   Yes No   Sig: Take 10 mg by mouth nightly as needed for Sleep. Facility-Administered Medications: None     Allergies   Allergen Reactions    Penicillins Rash    Sulfa (Sulfonamide Antibiotics) Rash        PHYSICAL EXAM       Vitals:    21 2357 21 0156 21 0218 21 0330   BP: (!) 203/86 (!) 200/84 (!) 183/78 (!) 142/65   Pulse: (!) 58 66 61 61   Resp: 18 27 20 18   Temp:    98.7 °F (37.1 °C)   SpO2: 94% 96% 95% 95%    Vital signs were reviewed. Physical Exam  Vitals signs and nursing note reviewed. Constitutional:       General: She is not in acute distress. Appearance: Normal appearance. She is not ill-appearing or toxic-appearing. HENT:      Head: Atraumatic.       Mouth/Throat:      Mouth: Mucous membranes are moist.   Eyes:      Extraocular Movements: Extraocular movements intact. Pupils: Pupils are equal, round, and reactive to light. Neck:      Musculoskeletal: Normal range of motion. Cardiovascular:      Rate and Rhythm: Normal rate and regular rhythm. Pulses: Normal pulses. Heart sounds: Normal heart sounds. Pulmonary:      Effort: Pulmonary effort is normal.      Breath sounds: Normal breath sounds. Abdominal:      Palpations: Abdomen is soft. Tenderness: There is no abdominal tenderness. Musculoskeletal: Normal range of motion. Right lower leg: Edema present. Left lower leg: Edema present. Comments: Symmetric 1+ pitting edema to bilateral lower extremities   Skin:     General: Skin is warm and dry. Neurological:      General: No focal deficit present. Mental Status: She is alert and oriented to person, place, and time. Psychiatric:         Mood and Affect: Mood normal.         Behavior: Behavior normal.         Thought Content: Thought content normal.         Judgment: Judgment normal.          MEDICAL DECISION MAKING     ED Course:    Recent Results (from the past 8 hour(s))   CBC WITH AUTOMATED DIFF    Collection Time: 02/23/21 11:40 PM   Result Value Ref Range    WBC 8.1 4.3 - 11.1 K/uL    RBC 4.44 4.05 - 5.2 M/uL    HGB 12.3 11.7 - 15.4 g/dL    HCT 39.2 35.8 - 46.3 %    MCV 88.3 79.6 - 97.8 FL    MCH 27.7 26.1 - 32.9 PG    MCHC 31.4 31.4 - 35.0 g/dL    RDW 14.4 11.9 - 14.6 %    PLATELET 960 945 - 195 K/uL    MPV 11.6 9.4 - 12.3 FL    ABSOLUTE NRBC 0.00 0.0 - 0.2 K/uL    DF AUTOMATED      NEUTROPHILS 69 43 - 78 %    LYMPHOCYTES 21 13 - 44 %    MONOCYTES 6 4.0 - 12.0 %    EOSINOPHILS 3 0.5 - 7.8 %    BASOPHILS 1 0.0 - 2.0 %    IMMATURE GRANULOCYTES 1 0.0 - 5.0 %    ABS. NEUTROPHILS 5.6 1.7 - 8.2 K/UL    ABS. LYMPHOCYTES 1.7 0.5 - 4.6 K/UL    ABS. MONOCYTES 0.5 0.1 - 1.3 K/UL    ABS. EOSINOPHILS 0.2 0.0 - 0.8 K/UL    ABS. BASOPHILS 0.1 0.0 - 0.2 K/UL    ABS. IMM.  GRANS. 0.1 0.0 - 0.5 K/UL   METABOLIC PANEL, COMPREHENSIVE    Collection Time: 02/23/21 11:40 PM   Result Value Ref Range    Sodium 143 136 - 145 mmol/L    Potassium 3.7 3.5 - 5.1 mmol/L    Chloride 110 (H) 98 - 107 mmol/L    CO2 26 21 - 32 mmol/L    Anion gap 7 7 - 16 mmol/L    Glucose 120 (H) 65 - 100 mg/dL    BUN 11 8 - 23 MG/DL    Creatinine 0.70 0.6 - 1.0 MG/DL    GFR est AA >60 >60 ml/min/1.73m2    GFR est non-AA >60 >60 ml/min/1.73m2    Calcium 8.7 8.3 - 10.4 MG/DL    Bilirubin, total 0.5 0.2 - 1.1 MG/DL    ALT (SGPT) 28 12 - 65 U/L    AST (SGOT) 24 15 - 37 U/L    Alk. phosphatase 112 50 - 136 U/L    Protein, total 7.5 6.3 - 8.2 g/dL    Albumin 3.8 3.2 - 4.6 g/dL    Globulin 3.7 (H) 2.3 - 3.5 g/dL    A-G Ratio 1.0 (L) 1.2 - 3.5     TROPONIN-HIGH SENSITIVITY    Collection Time: 02/23/21 11:40 PM   Result Value Ref Range    Troponin-High Sensitivity 11.9 0 - 14 pg/mL   MAGNESIUM    Collection Time: 02/23/21 11:40 PM   Result Value Ref Range    Magnesium 2.3 1.8 - 2.4 mg/dL   NT-PRO BNP    Collection Time: 02/23/21 11:40 PM   Result Value Ref Range    NT pro- (H) <450 PG/ML   EKG, 12 LEAD, INITIAL    Collection Time: 02/23/21 11:41 PM   Result Value Ref Range    Ventricular Rate 60 BPM    Atrial Rate 60 BPM    P-R Interval 162 ms    QRS Duration 156 ms    Q-T Interval 492 ms    QTC Calculation (Bezet) 492 ms    Calculated P Axis 88 degrees    Calculated R Axis 49 degrees    Calculated T Axis 87 degrees    Diagnosis       !! AGE AND GENDER SPECIFIC ECG ANALYSIS !!   Normal sinus rhythm  Left bundle branch block  Abnormal ECG  When compared with ECG of 05-FEB-2021 04:01,  No significant change was found     TROPONIN-HIGH SENSITIVITY    Collection Time: 02/24/21  1:56 AM   Result Value Ref Range    Troponin-High Sensitivity 27.9 (H) 0 - 14 pg/mL   URINALYSIS W/ RFLX MICROSCOPIC    Collection Time: 02/24/21  1:56 AM   Result Value Ref Range    Color YELLOW      Appearance CLEAR      Specific gravity 1.006 1.001 - 1.023      pH (UA) 7.5 5.0 - 9.0 Protein Negative NEG mg/dL    Glucose Negative mg/dL    Ketone Negative NEG mg/dL    Bilirubin Negative NEG      Blood Negative NEG      Urobilinogen 0.2 0.2 - 1.0 EU/dL    Nitrites Negative NEG      Leukocyte Esterase TRACE (A) NEG      WBC 0-3 0 /hpf    RBC 0-3 0 /hpf    Epithelial cells 0-3 0 /hpf    Bacteria 0 0 /hpf    Casts 0-3 0 /lpf     Xr Chest Port    Result Date: 2/24/2021  EXAM: XR CHEST PORT HISTORY: shortness of breath, palpitations. TECHNIQUE: A single frontal view of the chest was submitted. COMPARISON: 2/5/2021 FINDINGS: The cardiac silhouette, mediastinum, and pulmonary vasculature are within normal limits. There is no consolidation, pleural effusion, or pneumothorax. Diffuse increased interstitial lung markings are observed. No significant osseous abnormalities are observed. Diffuse increased interstitial lung markings. These may represent pulmonary vascular congestion or chronic changes. ED Course as of Feb 24 0639   Wed Feb 24, 2021   0244 Patient blood pressure improved. Discussed with cardiology who saw patient in the emergency department. Patient diuresing after Lasix. Patient will increase her amiodarone to 200 mg twice daily and will follow up with Dr. Angelica Gannon. [JL]      ED Course User Index  [JL] Mary Fret, DO       MDM  Number of Diagnoses or Management Options  Hypertension, unspecified type  Palpitations  Diagnosis management comments: 82-year-old female presents emergency department with intermittent palpitations. Patient had no additional palpitations while emergency department tonight. Patient labs reassuring. Patient had significant diuresis after having Lasix. Discussed with cardiology who saw patient in the emergency department. Patient will increase her amiodarone to 200 mg twice daily and will follow up with Dr. Angelica Gannon imminently. Patient will return to emergency department for any concerns.        Amount and/or Complexity of Data Reviewed  Clinical lab tests: ordered and reviewed  Tests in the radiology section of CPT®: ordered and reviewed  Decide to obtain previous medical records or to obtain history from someone other than the patient: yes  Review and summarize past medical records: yes  Discuss the patient with other providers: yes  Independent visualization of images, tracings, or specimens: yes    Patient Progress  Patient progress: improved        Disposition:  Discharged  Diagnosis:     ICD-10-CM ICD-9-CM   1. Palpitations  R00.2 785.1   2. Hypertension, unspecified type  I10 401.9     ____________________________________________________________________  A portion of this note was generated using voice recognition dictation software. While the note has been reviewed for accuracy, please note certain words and phrases may not be transcribed as intended and some grammatical and/or typographical errors may be present.

## 2021-03-30 ENCOUNTER — HOSPITAL ENCOUNTER (OUTPATIENT)
Dept: LAB | Age: 81
Discharge: HOME | End: 2021-03-30
Payer: MEDICARE

## 2021-03-30 DIAGNOSIS — I47.1 ATRIAL TACHYCARDIA (HCC): ICD-10-CM

## 2021-03-30 LAB
ANION GAP SERPL CALC-SCNC: 5 MMOL/L (ref 7–16)
BASOPHILS # BLD: 0 K/UL (ref 0–0.2)
BASOPHILS NFR BLD: 1 % (ref 0–2)
BUN SERPL-MCNC: 9 MG/DL (ref 8–23)
CALCIUM SERPL-MCNC: 8.9 MG/DL (ref 8.3–10.4)
CHLORIDE SERPL-SCNC: 105 MMOL/L (ref 98–107)
CO2 SERPL-SCNC: 27 MMOL/L (ref 21–32)
CREAT SERPL-MCNC: 0.9 MG/DL (ref 0.6–1)
DIFFERENTIAL METHOD BLD: ABNORMAL
EOSINOPHIL # BLD: 0.5 K/UL (ref 0–0.8)
EOSINOPHIL NFR BLD: 8 % (ref 0.5–7.8)
ERYTHROCYTE [DISTWIDTH] IN BLOOD BY AUTOMATED COUNT: 14.6 % (ref 11.9–14.6)
GLUCOSE SERPL-MCNC: 104 MG/DL (ref 65–100)
HCT VFR BLD AUTO: 39.9 % (ref 35.8–46.3)
HGB BLD-MCNC: 12.6 G/DL (ref 11.7–15.4)
IMM GRANULOCYTES # BLD AUTO: 0 K/UL (ref 0–0.5)
IMM GRANULOCYTES NFR BLD AUTO: 0 % (ref 0–5)
LYMPHOCYTES # BLD: 1 K/UL (ref 0.5–4.6)
LYMPHOCYTES NFR BLD: 16 % (ref 13–44)
MAGNESIUM SERPL-MCNC: 1.9 MG/DL (ref 1.8–2.4)
MCH RBC QN AUTO: 27.6 PG (ref 26.1–32.9)
MCHC RBC AUTO-ENTMCNC: 31.6 G/DL (ref 31.4–35)
MCV RBC AUTO: 87.5 FL (ref 79.6–97.8)
MONOCYTES # BLD: 0.6 K/UL (ref 0.1–1.3)
MONOCYTES NFR BLD: 10 % (ref 4–12)
NEUTS SEG # BLD: 4.1 K/UL (ref 1.7–8.2)
NEUTS SEG NFR BLD: 65 % (ref 43–78)
NRBC # BLD: 0 K/UL (ref 0–0.2)
PLATELET # BLD AUTO: 247 K/UL (ref 150–450)
PMV BLD AUTO: 11.5 FL (ref 9.4–12.3)
POTASSIUM SERPL-SCNC: 3.5 MMOL/L (ref 3.5–5.1)
RBC # BLD AUTO: 4.56 M/UL (ref 4.05–5.2)
SODIUM SERPL-SCNC: 137 MMOL/L (ref 136–145)
WBC # BLD AUTO: 6.3 K/UL (ref 4.3–11.1)

## 2021-03-30 PROCEDURE — 85025 COMPLETE CBC W/AUTO DIFF WBC: CPT

## 2021-03-30 PROCEDURE — 80048 BASIC METABOLIC PNL TOTAL CA: CPT

## 2021-03-30 PROCEDURE — 36415 COLL VENOUS BLD VENIPUNCTURE: CPT

## 2021-03-30 PROCEDURE — 83735 ASSAY OF MAGNESIUM: CPT

## 2021-04-06 ENCOUNTER — ANESTHESIA EVENT (OUTPATIENT)
Dept: SURGERY | Age: 81
End: 2021-04-06
Payer: MEDICARE

## 2021-04-07 RX ORDER — MIDAZOLAM HYDROCHLORIDE 1 MG/ML
2 INJECTION, SOLUTION INTRAMUSCULAR; INTRAVENOUS
Status: CANCELLED | OUTPATIENT
Start: 2021-04-07 | End: 2021-04-08

## 2021-04-07 RX ORDER — SODIUM CHLORIDE, SODIUM LACTATE, POTASSIUM CHLORIDE, CALCIUM CHLORIDE 600; 310; 30; 20 MG/100ML; MG/100ML; MG/100ML; MG/100ML
1000 INJECTION, SOLUTION INTRAVENOUS CONTINUOUS
Status: CANCELLED | OUTPATIENT
Start: 2021-04-07 | End: 2021-04-08

## 2021-04-07 RX ORDER — LIDOCAINE HYDROCHLORIDE 10 MG/ML
0.1 INJECTION INFILTRATION; PERINEURAL AS NEEDED
Status: CANCELLED | OUTPATIENT
Start: 2021-04-07

## 2021-04-07 RX ORDER — FENTANYL CITRATE 50 UG/ML
100 INJECTION, SOLUTION INTRAMUSCULAR; INTRAVENOUS AS NEEDED
Status: CANCELLED | OUTPATIENT
Start: 2021-04-07

## 2021-04-08 ENCOUNTER — APPOINTMENT (OUTPATIENT)
Dept: CARDIAC CATH/INVASIVE PROCEDURES | Age: 81
End: 2021-04-08
Payer: MEDICARE

## 2021-04-08 ENCOUNTER — HOSPITAL ENCOUNTER (OUTPATIENT)
Age: 81
Setting detail: OUTPATIENT SURGERY
Discharge: HOME | End: 2021-04-08
Attending: INTERNAL MEDICINE | Admitting: INTERNAL MEDICINE
Payer: MEDICARE

## 2021-04-08 ENCOUNTER — ANESTHESIA (OUTPATIENT)
Dept: SURGERY | Age: 81
End: 2021-04-08
Payer: MEDICARE

## 2021-04-08 VITALS
DIASTOLIC BLOOD PRESSURE: 71 MMHG | HEIGHT: 62 IN | WEIGHT: 140 LBS | TEMPERATURE: 98 F | OXYGEN SATURATION: 91 % | HEART RATE: 75 BPM | RESPIRATION RATE: 18 BRPM | SYSTOLIC BLOOD PRESSURE: 166 MMHG | BODY MASS INDEX: 25.76 KG/M2

## 2021-04-08 DIAGNOSIS — I48.0 PAF (PAROXYSMAL ATRIAL FIBRILLATION) (HCC): ICD-10-CM

## 2021-04-08 DIAGNOSIS — I47.1 ATRIAL TACHYCARDIA (HCC): ICD-10-CM

## 2021-04-08 LAB
ACT BLD: 285 SECS (ref 70–128)
ACT BLD: 312 SECS (ref 70–128)
ANION GAP SERPL CALC-SCNC: 10 MMOL/L (ref 7–16)
ATRIAL RATE: 105 BPM
BUN SERPL-MCNC: 8 MG/DL (ref 8–23)
CALCIUM SERPL-MCNC: 9.4 MG/DL (ref 8.3–10.4)
CALCULATED R AXIS, ECG10: 13 DEGREES
CALCULATED T AXIS, ECG11: 174 DEGREES
CHLORIDE SERPL-SCNC: 106 MMOL/L (ref 98–107)
CO2 SERPL-SCNC: 24 MMOL/L (ref 21–32)
CREAT SERPL-MCNC: 0.84 MG/DL (ref 0.6–1)
DIAGNOSIS, 93000: NORMAL
ERYTHROCYTE [DISTWIDTH] IN BLOOD BY AUTOMATED COUNT: 14.9 % (ref 11.9–14.6)
GLUCOSE SERPL-MCNC: 129 MG/DL (ref 65–100)
HCT VFR BLD AUTO: 41.9 % (ref 35.8–46.3)
HGB BLD-MCNC: 13.3 G/DL (ref 11.7–15.4)
INR PPP: 0.9
MAGNESIUM SERPL-MCNC: 1.8 MG/DL (ref 1.8–2.4)
MCH RBC QN AUTO: 27.3 PG (ref 26.1–32.9)
MCHC RBC AUTO-ENTMCNC: 31.7 G/DL (ref 31.4–35)
MCV RBC AUTO: 86 FL (ref 79.6–97.8)
NRBC # BLD: 0 K/UL (ref 0–0.2)
PLATELET # BLD AUTO: 268 K/UL (ref 150–450)
PMV BLD AUTO: 10.6 FL (ref 9.4–12.3)
POTASSIUM SERPL-SCNC: 3.2 MMOL/L (ref 3.5–5.1)
PROTHROMBIN TIME: 12.8 SEC (ref 12.5–14.7)
Q-T INTERVAL, ECG07: 366 MS
QRS DURATION, ECG06: 148 MS
QTC CALCULATION (BEZET), ECG08: 534 MS
RBC # BLD AUTO: 4.87 M/UL (ref 4.05–5.2)
SODIUM SERPL-SCNC: 140 MMOL/L (ref 136–145)
VENTRICULAR RATE, ECG03: 128 BPM
WBC # BLD AUTO: 7.4 K/UL (ref 4.3–11.1)

## 2021-04-08 PROCEDURE — 93622 COMP EP EVAL L VENTR PAC&REC: CPT | Performed by: INTERNAL MEDICINE

## 2021-04-08 PROCEDURE — 74011000250 HC RX REV CODE- 250: Performed by: INTERNAL MEDICINE

## 2021-04-08 PROCEDURE — 93312 ECHO TRANSESOPHAGEAL: CPT | Performed by: INTERNAL MEDICINE

## 2021-04-08 PROCEDURE — C1894 INTRO/SHEATH, NON-LASER: HCPCS

## 2021-04-08 PROCEDURE — 85610 PROTHROMBIN TIME: CPT

## 2021-04-08 PROCEDURE — 74011250636 HC RX REV CODE- 250/636: Performed by: NURSE ANESTHETIST, CERTIFIED REGISTERED

## 2021-04-08 PROCEDURE — C1893 INTRO/SHEATH, FIXED,NON-PEEL: HCPCS

## 2021-04-08 PROCEDURE — 77030027107 HC PTCH EXT REF CARTO3 J&J -F

## 2021-04-08 PROCEDURE — 85027 COMPLETE CBC AUTOMATED: CPT

## 2021-04-08 PROCEDURE — C1733 CATH, EP, OTHR THAN COOL-TIP: HCPCS

## 2021-04-08 PROCEDURE — 93656 COMPRE EP EVAL ABLTJ ATR FIB: CPT

## 2021-04-08 PROCEDURE — 77030019908 HC STETH ESOPH SIMS -A: Performed by: ANESTHESIOLOGY

## 2021-04-08 PROCEDURE — 83735 ASSAY OF MAGNESIUM: CPT

## 2021-04-08 PROCEDURE — 93613 INTRACARDIAC EPHYS 3D MAPG: CPT

## 2021-04-08 PROCEDURE — 77030002912 HC SUT ETHBND J&J -A

## 2021-04-08 PROCEDURE — 93312 ECHO TRANSESOPHAGEAL: CPT

## 2021-04-08 PROCEDURE — 74011000250 HC RX REV CODE- 250: Performed by: NURSE ANESTHETIST, CERTIFIED REGISTERED

## 2021-04-08 PROCEDURE — C1732 CATH, EP, DIAG/ABL, 3D/VECT: HCPCS

## 2021-04-08 PROCEDURE — 93320 DOPPLER ECHO COMPLETE: CPT | Performed by: INTERNAL MEDICINE

## 2021-04-08 PROCEDURE — 74011250636 HC RX REV CODE- 250/636: Performed by: INTERNAL MEDICINE

## 2021-04-08 PROCEDURE — 80048 BASIC METABOLIC PNL TOTAL CA: CPT

## 2021-04-08 PROCEDURE — 93622 COMP EP EVAL L VENTR PAC&REC: CPT

## 2021-04-08 PROCEDURE — 77030020506 HC NDL TRNSPTL NRG BAYL -F

## 2021-04-08 PROCEDURE — 77030039425 HC BLD LARYNG TRULITE DISP TELE -A: Performed by: ANESTHESIOLOGY

## 2021-04-08 PROCEDURE — 77030013687 HC GD NDL BARD -B

## 2021-04-08 PROCEDURE — 76060000035 HC ANESTHESIA 2 TO 2.5 HR: Performed by: INTERNAL MEDICINE

## 2021-04-08 PROCEDURE — 85347 COAGULATION TIME ACTIVATED: CPT

## 2021-04-08 PROCEDURE — 74011250636 HC RX REV CODE- 250/636: Performed by: ANESTHESIOLOGY

## 2021-04-08 PROCEDURE — 93623 PRGRMD STIMJ&PACG IV RX NFS: CPT

## 2021-04-08 PROCEDURE — 93655 ICAR CATH ABLTJ DSCRT ARRHYT: CPT | Performed by: INTERNAL MEDICINE

## 2021-04-08 PROCEDURE — 93623 PRGRMD STIMJ&PACG IV RX NFS: CPT | Performed by: INTERNAL MEDICINE

## 2021-04-08 PROCEDURE — 76210000006 HC OR PH I REC 0.5 TO 1 HR: Performed by: INTERNAL MEDICINE

## 2021-04-08 PROCEDURE — C1759 CATH, INTRA ECHOCARDIOGRAPHY: HCPCS

## 2021-04-08 PROCEDURE — 93325 DOPPLER ECHO COLOR FLOW MAPG: CPT | Performed by: INTERNAL MEDICINE

## 2021-04-08 PROCEDURE — 93655 ICAR CATH ABLTJ DSCRT ARRHYT: CPT

## 2021-04-08 PROCEDURE — 77030013794 HC KT TRNSDUC BLD EDWD -B

## 2021-04-08 PROCEDURE — 93613 INTRACARDIAC EPHYS 3D MAPG: CPT | Performed by: INTERNAL MEDICINE

## 2021-04-08 PROCEDURE — 93662 INTRACARDIAC ECG (ICE): CPT

## 2021-04-08 PROCEDURE — 93656 COMPRE EP EVAL ABLTJ ATR FIB: CPT | Performed by: INTERNAL MEDICINE

## 2021-04-08 PROCEDURE — 93005 ELECTROCARDIOGRAM TRACING: CPT | Performed by: INTERNAL MEDICINE

## 2021-04-08 PROCEDURE — 77030035291 HC TBNG PMP SMARTABLATE J&J -B

## 2021-04-08 PROCEDURE — 93662 INTRACARDIAC ECG (ICE): CPT | Performed by: INTERNAL MEDICINE

## 2021-04-08 PROCEDURE — 93657 TX L/R ATRIAL FIB ADDL: CPT

## 2021-04-08 PROCEDURE — 74011250637 HC RX REV CODE- 250/637: Performed by: ANESTHESIOLOGY

## 2021-04-08 PROCEDURE — 77030037088 HC TUBE ENDOTRACH ORAL NSL COVD-A: Performed by: ANESTHESIOLOGY

## 2021-04-08 RX ORDER — ONDANSETRON 2 MG/ML
INJECTION INTRAMUSCULAR; INTRAVENOUS AS NEEDED
Status: DISCONTINUED | OUTPATIENT
Start: 2021-04-08 | End: 2021-04-08 | Stop reason: HOSPADM

## 2021-04-08 RX ORDER — HEPARIN SODIUM 5000 [USP'U]/100ML
INJECTION, SOLUTION INTRAVENOUS
Status: DISCONTINUED | OUTPATIENT
Start: 2021-04-08 | End: 2021-04-08 | Stop reason: HOSPADM

## 2021-04-08 RX ORDER — SODIUM CHLORIDE 0.9 % (FLUSH) 0.9 %
5 SYRINGE (ML) INJECTION AS NEEDED
Status: DISCONTINUED | OUTPATIENT
Start: 2021-04-08 | End: 2021-04-08 | Stop reason: HOSPADM

## 2021-04-08 RX ORDER — OXYCODONE HYDROCHLORIDE 5 MG/1
5 TABLET ORAL
Status: COMPLETED | OUTPATIENT
Start: 2021-04-08 | End: 2021-04-08

## 2021-04-08 RX ORDER — SODIUM CHLORIDE, SODIUM LACTATE, POTASSIUM CHLORIDE, CALCIUM CHLORIDE 600; 310; 30; 20 MG/100ML; MG/100ML; MG/100ML; MG/100ML
75 INJECTION, SOLUTION INTRAVENOUS CONTINUOUS
Status: DISCONTINUED | OUTPATIENT
Start: 2021-04-08 | End: 2021-04-08 | Stop reason: HOSPADM

## 2021-04-08 RX ORDER — SODIUM CHLORIDE, SODIUM LACTATE, POTASSIUM CHLORIDE, CALCIUM CHLORIDE 600; 310; 30; 20 MG/100ML; MG/100ML; MG/100ML; MG/100ML
INJECTION, SOLUTION INTRAVENOUS
Status: DISCONTINUED | OUTPATIENT
Start: 2021-04-08 | End: 2021-04-08 | Stop reason: HOSPADM

## 2021-04-08 RX ORDER — PROPOFOL 10 MG/ML
INJECTION, EMULSION INTRAVENOUS AS NEEDED
Status: DISCONTINUED | OUTPATIENT
Start: 2021-04-08 | End: 2021-04-08 | Stop reason: HOSPADM

## 2021-04-08 RX ORDER — FENTANYL CITRATE 50 UG/ML
INJECTION, SOLUTION INTRAMUSCULAR; INTRAVENOUS AS NEEDED
Status: DISCONTINUED | OUTPATIENT
Start: 2021-04-08 | End: 2021-04-08 | Stop reason: HOSPADM

## 2021-04-08 RX ORDER — COLCHICINE 0.6 MG/1
0.3 TABLET ORAL DAILY
Qty: 15 TAB | Refills: 0 | Status: SHIPPED | OUTPATIENT
Start: 2021-04-08 | End: 2021-05-13 | Stop reason: ALTCHOICE

## 2021-04-08 RX ORDER — ADENOSINE 3 MG/ML
140 INJECTION, SOLUTION INTRAVENOUS
Status: DISCONTINUED | OUTPATIENT
Start: 2021-04-08 | End: 2021-04-08 | Stop reason: HOSPADM

## 2021-04-08 RX ORDER — HEPARIN SODIUM 200 [USP'U]/100ML
2000 INJECTION, SOLUTION INTRAVENOUS AS NEEDED
Status: DISCONTINUED | OUTPATIENT
Start: 2021-04-08 | End: 2021-04-08

## 2021-04-08 RX ORDER — PROPOFOL 10 MG/ML
INJECTION, EMULSION INTRAVENOUS
Status: DISCONTINUED | OUTPATIENT
Start: 2021-04-08 | End: 2021-04-08 | Stop reason: HOSPADM

## 2021-04-08 RX ORDER — NALOXONE HYDROCHLORIDE 0.4 MG/ML
0.1 INJECTION, SOLUTION INTRAMUSCULAR; INTRAVENOUS; SUBCUTANEOUS AS NEEDED
Status: DISCONTINUED | OUTPATIENT
Start: 2021-04-08 | End: 2021-04-08 | Stop reason: HOSPADM

## 2021-04-08 RX ORDER — PANTOPRAZOLE SODIUM 40 MG/1
40 TABLET, DELAYED RELEASE ORAL EVERY 12 HOURS
Qty: 60 TAB | Refills: 0 | Status: SHIPPED | OUTPATIENT
Start: 2021-04-08 | End: 2021-05-13 | Stop reason: ALTCHOICE

## 2021-04-08 RX ORDER — ROCURONIUM BROMIDE 10 MG/ML
INJECTION, SOLUTION INTRAVENOUS AS NEEDED
Status: DISCONTINUED | OUTPATIENT
Start: 2021-04-08 | End: 2021-04-08 | Stop reason: HOSPADM

## 2021-04-08 RX ORDER — SUCRALFATE 1 G/1
1 TABLET ORAL 4 TIMES DAILY
Qty: 120 TAB | Refills: 0 | Status: SHIPPED | OUTPATIENT
Start: 2021-04-08 | End: 2021-05-13 | Stop reason: ALTCHOICE

## 2021-04-08 RX ORDER — HYDROMORPHONE HYDROCHLORIDE 2 MG/ML
0.5 INJECTION, SOLUTION INTRAMUSCULAR; INTRAVENOUS; SUBCUTANEOUS
Status: DISCONTINUED | OUTPATIENT
Start: 2021-04-08 | End: 2021-04-08 | Stop reason: HOSPADM

## 2021-04-08 RX ORDER — OXYCODONE HYDROCHLORIDE 5 MG/1
10 TABLET ORAL
Status: COMPLETED | OUTPATIENT
Start: 2021-04-08 | End: 2021-04-08

## 2021-04-08 RX ORDER — DEXAMETHASONE SODIUM PHOSPHATE 4 MG/ML
INJECTION, SOLUTION INTRA-ARTICULAR; INTRALESIONAL; INTRAMUSCULAR; INTRAVENOUS; SOFT TISSUE AS NEEDED
Status: DISCONTINUED | OUTPATIENT
Start: 2021-04-08 | End: 2021-04-08 | Stop reason: HOSPADM

## 2021-04-08 RX ORDER — PROTAMINE SULFATE 10 MG/ML
INJECTION, SOLUTION INTRAVENOUS AS NEEDED
Status: DISCONTINUED | OUTPATIENT
Start: 2021-04-08 | End: 2021-04-08 | Stop reason: HOSPADM

## 2021-04-08 RX ORDER — LIDOCAINE HYDROCHLORIDE 20 MG/ML
INJECTION, SOLUTION EPIDURAL; INFILTRATION; INTRACAUDAL; PERINEURAL AS NEEDED
Status: DISCONTINUED | OUTPATIENT
Start: 2021-04-08 | End: 2021-04-08 | Stop reason: HOSPADM

## 2021-04-08 RX ORDER — HEPARIN SODIUM 1000 [USP'U]/ML
INJECTION, SOLUTION INTRAVENOUS; SUBCUTANEOUS AS NEEDED
Status: DISCONTINUED | OUTPATIENT
Start: 2021-04-08 | End: 2021-04-08 | Stop reason: HOSPADM

## 2021-04-08 RX ORDER — ONDANSETRON 2 MG/ML
4 INJECTION INTRAMUSCULAR; INTRAVENOUS ONCE
Status: COMPLETED | OUTPATIENT
Start: 2021-04-08 | End: 2021-04-08

## 2021-04-08 RX ORDER — SUCCINYLCHOLINE CHLORIDE 20 MG/ML
INJECTION INTRAMUSCULAR; INTRAVENOUS AS NEEDED
Status: DISCONTINUED | OUTPATIENT
Start: 2021-04-08 | End: 2021-04-08 | Stop reason: HOSPADM

## 2021-04-08 RX ORDER — LIDOCAINE HYDROCHLORIDE 10 MG/ML
20 INJECTION INFILTRATION; PERINEURAL
Status: DISCONTINUED | OUTPATIENT
Start: 2021-04-08 | End: 2021-04-08 | Stop reason: HOSPADM

## 2021-04-08 RX ORDER — HEPARIN SODIUM 200 [USP'U]/100ML
4000 INJECTION, SOLUTION INTRAVENOUS AS NEEDED
Status: DISCONTINUED | OUTPATIENT
Start: 2021-04-08 | End: 2021-04-08 | Stop reason: HOSPADM

## 2021-04-08 RX ORDER — DIPHENHYDRAMINE HYDROCHLORIDE 50 MG/ML
12.5 INJECTION, SOLUTION INTRAMUSCULAR; INTRAVENOUS ONCE
Status: DISCONTINUED | OUTPATIENT
Start: 2021-04-08 | End: 2021-04-08 | Stop reason: HOSPADM

## 2021-04-08 RX ADMIN — PHENYLEPHRINE HYDROCHLORIDE 120 MCG: 10 INJECTION INTRAVENOUS at 08:33

## 2021-04-08 RX ADMIN — HEPARIN SODIUM 40 UNITS/KG/HR: 5000 INJECTION, SOLUTION INTRAVENOUS at 08:33

## 2021-04-08 RX ADMIN — HEPARIN SODIUM 6000 UNITS: 1000 INJECTION, SOLUTION INTRAVENOUS; SUBCUTANEOUS at 08:22

## 2021-04-08 RX ADMIN — PHENYLEPHRINE HYDROCHLORIDE 100 MCG: 10 INJECTION INTRAVENOUS at 09:42

## 2021-04-08 RX ADMIN — LIDOCAINE HYDROCHLORIDE 10 ML: 10 INJECTION, SOLUTION INFILTRATION; PERINEURAL at 09:01

## 2021-04-08 RX ADMIN — SODIUM CHLORIDE, SODIUM LACTATE, POTASSIUM CHLORIDE, AND CALCIUM CHLORIDE: 600; 310; 30; 20 INJECTION, SOLUTION INTRAVENOUS at 07:37

## 2021-04-08 RX ADMIN — PHENYLEPHRINE HYDROCHLORIDE 120 MCG: 10 INJECTION INTRAVENOUS at 08:41

## 2021-04-08 RX ADMIN — LIDOCAINE HYDROCHLORIDE 40 MG: 20 INJECTION, SOLUTION EPIDURAL; INFILTRATION; INTRACAUDAL; PERINEURAL at 07:57

## 2021-04-08 RX ADMIN — OXYCODONE 5 MG: 5 TABLET ORAL at 10:27

## 2021-04-08 RX ADMIN — PHENYLEPHRINE HYDROCHLORIDE 100 MCG: 10 INJECTION INTRAVENOUS at 09:37

## 2021-04-08 RX ADMIN — ROCURONIUM BROMIDE 5 MG: 10 INJECTION, SOLUTION INTRAVENOUS at 08:28

## 2021-04-08 RX ADMIN — HEPARIN SODIUM 2000 UNITS: 1000 INJECTION, SOLUTION INTRAVENOUS; SUBCUTANEOUS at 08:47

## 2021-04-08 RX ADMIN — HEPARIN SODIUM 8000 UNITS: 200 INJECTION, SOLUTION INTRAVENOUS at 09:00

## 2021-04-08 RX ADMIN — ROCURONIUM BROMIDE 15 MG: 10 INJECTION, SOLUTION INTRAVENOUS at 08:50

## 2021-04-08 RX ADMIN — DEXAMETHASONE SODIUM PHOSPHATE 4 MG: 4 INJECTION, SOLUTION INTRAMUSCULAR; INTRAVENOUS at 09:11

## 2021-04-08 RX ADMIN — PHENYLEPHRINE HYDROCHLORIDE 120 MCG: 10 INJECTION INTRAVENOUS at 08:12

## 2021-04-08 RX ADMIN — PHENYLEPHRINE HYDROCHLORIDE 60 MCG: 10 INJECTION INTRAVENOUS at 09:17

## 2021-04-08 RX ADMIN — PHENYLEPHRINE HYDROCHLORIDE 60 MCG: 10 INJECTION INTRAVENOUS at 08:55

## 2021-04-08 RX ADMIN — PHENYLEPHRINE HYDROCHLORIDE 60 MCG: 10 INJECTION INTRAVENOUS at 09:25

## 2021-04-08 RX ADMIN — SUGAMMADEX 200 MG: 100 INJECTION, SOLUTION INTRAVENOUS at 09:45

## 2021-04-08 RX ADMIN — ROCURONIUM BROMIDE 5 MG: 10 INJECTION, SOLUTION INTRAVENOUS at 09:43

## 2021-04-08 RX ADMIN — ONDANSETRON 4 MG: 2 INJECTION INTRAMUSCULAR; INTRAVENOUS at 09:11

## 2021-04-08 RX ADMIN — ONDANSETRON 4 MG: 2 INJECTION INTRAMUSCULAR; INTRAVENOUS at 18:00

## 2021-04-08 RX ADMIN — PHENYLEPHRINE HYDROCHLORIDE 60 MCG: 10 INJECTION INTRAVENOUS at 08:50

## 2021-04-08 RX ADMIN — PHENYLEPHRINE HYDROCHLORIDE 60 MCG: 10 INJECTION INTRAVENOUS at 09:02

## 2021-04-08 RX ADMIN — PROPOFOL 20 MG: 10 INJECTION, EMULSION INTRAVENOUS at 07:42

## 2021-04-08 RX ADMIN — FENTANYL CITRATE 50 MCG: 50 INJECTION INTRAMUSCULAR; INTRAVENOUS at 07:42

## 2021-04-08 RX ADMIN — PHENYLEPHRINE HYDROCHLORIDE 120 MCG: 10 INJECTION INTRAVENOUS at 08:20

## 2021-04-08 RX ADMIN — PROPOFOL 100 MCG/KG/MIN: 10 INJECTION, EMULSION INTRAVENOUS at 07:42

## 2021-04-08 RX ADMIN — PHENYLEPHRINE HYDROCHLORIDE 60 MCG: 10 INJECTION INTRAVENOUS at 09:07

## 2021-04-08 RX ADMIN — ROCURONIUM BROMIDE 10 MG: 10 INJECTION, SOLUTION INTRAVENOUS at 07:57

## 2021-04-08 RX ADMIN — PROTAMINE SULFATE 100 MG: 10 INJECTION, SOLUTION INTRAVENOUS at 09:45

## 2021-04-08 RX ADMIN — HEPARIN SODIUM 6000 UNITS: 1000 INJECTION, SOLUTION INTRAVENOUS; SUBCUTANEOUS at 08:33

## 2021-04-08 RX ADMIN — ADENOSINE 8.89 MG/MIN: 3 INJECTION, SOLUTION INTRAVENOUS at 09:00

## 2021-04-08 RX ADMIN — HYDROMORPHONE HYDROCHLORIDE 0.5 MG: 2 INJECTION INTRAMUSCULAR; INTRAVENOUS; SUBCUTANEOUS at 17:32

## 2021-04-08 RX ADMIN — ROCURONIUM BROMIDE 20 MG: 10 INJECTION, SOLUTION INTRAVENOUS at 08:15

## 2021-04-08 RX ADMIN — SUCCINYLCHOLINE CHLORIDE 140 MG: 20 INJECTION, SOLUTION INTRAMUSCULAR; INTRAVENOUS at 07:57

## 2021-04-08 RX ADMIN — PHENYLEPHRINE HYDROCHLORIDE 60 MCG: 10 INJECTION INTRAVENOUS at 09:14

## 2021-04-08 RX ADMIN — PROPOFOL 100 MG: 10 INJECTION, EMULSION INTRAVENOUS at 07:57

## 2021-04-08 RX ADMIN — OXYCODONE HYDROCHLORIDE 10 MG: 5 TABLET ORAL at 13:23

## 2021-04-08 RX ADMIN — PHENYLEPHRINE HYDROCHLORIDE 60 MCG: 10 INJECTION INTRAVENOUS at 09:32

## 2021-04-08 RX ADMIN — ROCURONIUM BROMIDE 5 MG: 10 INJECTION, SOLUTION INTRAVENOUS at 09:17

## 2021-04-08 NOTE — PROGRESS NOTES
Assisted OOB & ambulated to BR & on unit. Tolerated activity without difficulty.  Right & left  without bleeding or hematoma  Pt reports nausea with ambulation - medicated with zofran 4 mg given slow IV push

## 2021-04-08 NOTE — PROCEDURES
Pre-Electrophysiology Diagnosis  1. Paroxysmal atrial fibrillation  2. Paroxysmal atrial tachycardia     Procedure Performed  1. EPS afib ablation/pulmonary vein isolation  2. Left atrial pacing recording from the coronary sinus. 3. 3-D Electroanatomical mapping  4. Intracardiac echo  5. Ablation of second arrhythmia  6. LV pacing and recording  7. Transesophageal echo  8. Drug infusion    Anesthesia: MAC     Estimated Blood Loss: Less than 10 mL     Specimens: * No specimens in log *    Procedure in Detail:  The patient was brought to the electrophysiology lab in the fasting state. The patient was intubated by anesthesiology and an esophageal temperature probe inserted and advanced to a location directly posterior to the LA at the level of the pulmonary veins. The esophageal temperature probe was repositioned throughout the case to a location as close the the ablation catheter as possible. If the esophageal temperature increased 0.5 degrees Celsius ablation was stopped until the temperature returned to baseline. A tranesophageal echocardiogram was performed directly prior to the procedure and was negative for a SYDNI thrombus (see full report in chart). A Ref-Star CARTO patch was placed, the patient was then prepped and draped in sterile fashion. Venous access was obtained under ultrasound guidance x4 using modified Seldinger technique, with placement of one 8Fr short sidearm sheath and two 8.5 Fr SL-O 63cm long braided sheaths in the right femoral vein and placement of a 10Fr sheath into the left femoral vein. An intra-cardiac echo probe was prepped, and then inserted into an 10 Fr short sheath and used to localize the fossa ovalis and create LA and pulmonary vein anatomy utilizing [employer] [facility]. A [employer] Pentaray catheter was then inserted into an 8.5 SLO Fr sheath and used to create an electroanatomical map of the right atrium, including attempts at His localization and the os of the CS. Then a Smart Touch porous irrigated BW 3.5mm ablation catheter was used to map out the body of the CS. A decapolar [employer] Hackett CS catheter was inserted via an 8Fr sheath and positioned in the mid coronary sinus. Next, a trans-septal needle was inserted into the SLO and was used to perform a trans-septal puncture with assistance from ICE (intra-cardiac echo) as well as the [address] [place] [address],[address] map and the right atrial impedence map. The SLO sheath was advanced into the LA. Total weight based heparin bolus was administered just after transeptal access, and systemic blood pressure monitored invasively. The patient was then placed on our heparin weight based nomogram for targeted ACT of 300-350 during the ablation procedure. A second trans-septal access was obtained with the ablation catheter. The Pentaray catheter was then inserted into the LA via the SL-O sheath and was used to create a detail electroanatomical voltage map of the left atrium including the pulmonary veins to identify the pulmonary vein sleeves and further guide additional ablation as pictured below. The Pentaray was used to map pulmonary vein potentials and target ablation. An 8 Fr, 3.5mm tip saline irrigated bidirectional D/F curve Thermo-cool SmartTouch catheter was used for RF ablation and ablation was performed at 25-50W with reduction in power as needed if ablation was performed in close proximity to the esophagus. Antral pulmonary vein isolation was then performed for all 4 pulmonary veins. Entrance and exit block was demonstrated by left atrial pacing and within the pulmonary veins. Lack of any conducted atrial EGMs into the pulmonary veins was documented. Prior to ablation of the right antral pulmonary veins, the ablation catheter was used to pace at high output to try to localize the right phrenic nerve and map its location prior to ablation.  Adenosine was injected (6-12 mg) to demonstrate the lack of early reconnection with the Pentaray in the PVs. There was early reconnection with adenosine within the right pulmonary veins. Additional ablation was performed with additional adenosine injection without reconnection. The ablation catheter was inserted into the LV, documented LV electrograms and was used to pace the LV for the EP study and for rescue pacing during adenosine infusion. Post PVI, the Pentaray was used to perform a second LA voltage map post ablation to demonstrate pulmonary vein isolation and post ablation voltage as pictured below. The patient was having incessant LA PACs during the procedure and has a history of atrial tachycardia. The pentaray was used to map the PACs and the earliest sit was the anterior septum. Ablation at this location resulted in termination of the LA PACs. Baseline LA Voltage Map      Post Ablation LA Voltage Map      Next, baseline recordings and a diagnostic EP study was performed. The coronary sinus multipolar catheter was used to pace the left atrium during the EP study. The LA CS electrograms were documented and interpreted during the procedure. A comprehensive EP study was performed with 1:1 AV decremental pacing, atrial extrastimuli and ventricular pacing to assess retrograde conduction. The patient did not have sustained slow pathway conduction or evidence of an accessory pathway. Ventricular pacing revealed no retrograde conduction. At the completion of the ablation and EPS, all catheters were removed, 100mg Protamine was administered and sheaths were pulled after placing a figure of 8 stitch. The patient tolerated the procedure well with no acute complications recognized. The patient left the EP lab in stable condition, in normal sinus rhythm. Just prior to pulling shealths, the ICE catheter was used to obtain ultrasound images and revealed no evidence of pericardial effusion.     Baseline Intervals    QRS duration: 153 msec  MI interval: 157 msec  RR interval: 1027 msec  AH interval: 105 msec  HV interval: 47 msec    EP Study    AV Wenchebach: 340 msec  AV rikki ERP: < or equal to 250 msec  VA Wenchebach: No VA conduction     Complications: None    Summary:   1. Successful pulmonary vein isolation x4.  2. Successful ablation of left atrial tachycardia focus, left atrial PACs  3. Comprehensive EP study. 4. Pt tolerated the procedure well. 5. Family updated. Aleta Floyd MD, [address]  Clinical Cardiac Electrophysiology  4/8/2021  9:50 AM

## 2021-04-08 NOTE — ANESTHESIA PREPROCEDURE EVALUATION
Relevant Problems   No relevant active problems       Anesthetic History   No history of anesthetic complications            Review of Systems / Medical History  Patient summary reviewed and pertinent labs reviewed    Pulmonary  Within defined limits                 Neuro/Psych   Within defined limits           Cardiovascular    Hypertension        Dysrhythmias (Amio held) : SVT  CAD and cardiac stents      Comments: NST 2018: wnl  Echo 2021: Preserved EF with no significant valve abnormalities    LBBB   GI/Hepatic/Renal     GERD           Endo/Other        Arthritis     Other Findings   Comments: Shingles  [place] Palsy         Physical Exam    Airway  Mallampati: II  TM Distance: 4 - 6 cm  Neck ROM: normal range of motion   Mouth opening: Normal     Cardiovascular    Rhythm: regular  Rate: normal         Dental  No notable dental hx       Pulmonary  Breath sounds clear to auscultation               Abdominal  GI exam deferred       Other Findings            Anesthetic Plan    ASA: 3  Anesthesia type: total IV anesthesia          Induction: Intravenous  Anesthetic plan and risks discussed with: Patient

## 2021-04-08 NOTE — PROGRESS NOTES
Reports bilateral groin pain improved, milk given per pt request & just finished a sandwich tray.  Denies any needs at this time

## 2021-04-08 NOTE — PROGRESS NOTES
18:15 - Discharge instructions given per orders, voiced good understanding of Bilateral groin care, medications & follow up care. Denies any questions    Explained new scripts colchicine 0.3 mg once a day - protonix 40 mg twice a day - carafate 1 gram 4 times a day - all escribed to [address] & xarelto 20 mg once a day (called in to [address] on [email or web address]) disp 30 with refills 11. Also given a xarelto discount card. Explained to both pt & daughter.  Voiced good understanding    18:30 - Pt reports nausea is relieved    18:35 - assisted to car via wheelchair - accompanied by daughter

## 2021-04-08 NOTE — ADDENDUM NOTE
Addendum  created 04/08/21 1240 by Rdoney Machado CRNA    Flowsheet accepted, Intraprocedure Flowsheets edited

## 2021-04-08 NOTE — PROGRESS NOTES
TRANSFER - IN REPORT:    Verbal report received from Jacob Freeman PACU RN on Elissa Flair  being received from EP LAB / PACU for routine post - op      Report consisted of patients Situation, Background, Assessment and   Recommendations(SBAR). Information from the following report(s) SBAR, Procedure Summary, MAR and Cardiac Rhythm NSR BBB rate 78 was reviewed with the receiving nurse. Opportunity for questions and clarification was provided. Assessment completed upon patients arrival to unit and care assumed.      Assisted with bedpan to void

## 2021-04-08 NOTE — ANESTHESIA POSTPROCEDURE EVALUATION
Procedure(s):  SVT  ABLATION. total IV anesthesia, general    Anesthesia Post Evaluation      Multimodal analgesia: multimodal analgesia used between 6 hours prior to anesthesia start to PACU discharge  Patient location during evaluation: bedside  Patient participation: complete - patient participated  Level of consciousness: responsive to verbal stimuli  Pain management: adequate  Airway patency: patent  Anesthetic complications: no  Cardiovascular status: hemodynamically stable  Respiratory status: spontaneous ventilation  Hydration status: stable    Final Post Anesthesia Temperature Assessment:  Normothermia (36.0-37.5 degrees C)      INITIAL Post-op Vital signs:   Vitals Value Taken Time   /77 04/08/21 1018   Temp 36.8 °C (98.3 °F) 04/08/21 1005   Pulse 76 04/08/21 1018   Resp 16 04/08/21 1007   SpO2 99 % 04/08/21 1018   Vitals shown include unvalidated device data.

## 2021-04-08 NOTE — PROGRESS NOTES
Attempted to place purwick after voiding approx 300 ml on bedpan, unable to void  & continues to complain of bladder pain &  palpable bladder distention. #16 fr oneil placed using sterile technique with immediate return of 1400 ml clear yellow urine.  Pt voiced much relief after oneil placed

## 2021-04-08 NOTE — DISCHARGE INSTRUCTIONS
Patient Education        Electrophysiology Study and Catheter Ablation: What to Expect at [address] [place] [address] Drive had an electrophysiology study for a problem with your heartbeat. You may also have had a catheter ablation to try to correct the problem. You may have swelling, bruising, or a small lump around the site where the catheters went into your body. These should go away in 3 to 4 weeks. Do not exercise hard or lift anything heavy for a week. You may be able to go back to work and to your normal routine in 1 or 2 days. This care sheet gives you a general idea about how long it will take for you to recover. But each person recovers at a different pace. Follow the steps below to get better as quickly as possible. How can you care for yourself at home? Activity    · For 1 week, do not lift anything that would make you strain. This may include heavy grocery bags and milk containers, a heavy briefcase or backpack, cat litter or dog food bags, a vacuum , or a child.     · For 1 week, do not exercise hard or do any activity that could strain your blood vessels or the site where the catheters went into your body.     · Ask your doctor when it is okay to have sex. Diet    · You can eat your normal diet. If your stomach is upset, try bland, low-fat foods like plain rice, broiled chicken, toast, and yogurt.     · Drink plenty of fluids (unless your doctor tells you not to). Medicines    · Your doctor will tell you if and when you can restart your medicines. He or she will also give you instructions about taking any new medicines.     · If you take aspirin or some other blood thinner, ask your doctor if and when to start taking it again. Make sure that you understand exactly what your doctor wants you to do.     · Ask your doctor if you can take acetaminophen (Tylenol) for pain.  Do not take aspirin for 3 days, unless your doctor says it is okay.     · Check with your doctor before you take aspirin or anti-inflammatory medicines to reduce pain and swelling. These include ibuprofen (Advil, Motrin) and naproxen (Aleve).   · Make sure you know which heart medicines to continue and which ones to stop. Ask your doctor if you aren't sure. Catheter site care    · You can remove your bandages the day after the procedure.     · You may shower 24 to 48 hours after the procedure, if your doctor okays it. Pat the incision dry.     · Do not soak the catheter site until it is healed. Don't take a bath for 1 week, or until your doctor tells you it is okay.     · Watch for bleeding from the site. A small amount of blood (up to the size of a quarter) on the bandage can be normal.     · If you are bleeding, lie down and press on the area for 15 minutes to try to make it stop. If the bleeding does not stop, call your doctor or seek immediate medical care. Follow-up care is a key part of your treatment and safety. Be sure to make and go to all appointments, and call your doctor if you are having problems. It's also a good idea to know your test results and keep a list of the medicines you take. When should you call for help? Call  911 anytime you think you may need emergency care. For example, call if:    · You passed out (lost consciousness).     · You have symptoms of a heart attack. These may include:  ? Chest pain or pressure, or a strange feeling in the chest.  ? Sweating. ? Shortness of breath. ? Nausea or vomiting. ? Pain, pressure, or a strange feeling in the back, neck, jaw, or upper belly, or in one or both shoulders or arms. ? Lightheadedness or sudden weakness. ? A fast or irregular heartbeat. After you call 911, the  may tell you to chew 1 adult-strength or 2 to 4 low-dose aspirin. Wait for an ambulance. Do not try to drive yourself.     · You have symptoms of a stroke.  These may include:  ? Sudden numbness, tingling, weakness, or loss of movement in your face, arm, or leg, especially on only one side of your body. ? Sudden vision changes. ? Sudden trouble speaking. ? Sudden confusion or trouble understanding simple statements. ? Sudden problems with walking or balance. ? A sudden, severe headache that is different from past headaches. Call your doctor now or seek immediate medical care if:    · You are bleeding from the area where the catheter was put in your blood vessel.     · You have a fast-growing, painful lump at the catheter site.     · You have signs of infection, such as:  ? Increased pain, swelling, warmth, or redness. ? Red streaks leading from the catheter site. ? Pus draining from the catheter site. ? A fever.     · Your leg, arm, or hand is painful, looks blue, or feels cold, numb, or tingly. Watch closely for any changes in your health, and be sure to contact your doctor if you have any problems. Where can you learn more? Go to http://www.gray.com/  Enter H580 in the search box to learn more about \"Electrophysiology Study and Catheter Ablation: What to Expect at Home. \"  Current as of: August 31, 2020               Content Version: 12.8  © 8983-9465 [employer]. Care instructions adapted under license by [employer] (which disclaims liability or warranty for this information). If you have questions about a medical condition or this instruction, always ask your healthcare professional. [place] [address] any warranty or liability for your use of this information.

## 2021-04-08 NOTE — PROGRESS NOTES
Medicated with Oxycodone 10 mg for complaint of bilateral groin pain, oneil emptied of 1200ml clear yellow urine

## 2021-04-08 NOTE — PROGRESS NOTES
Sutures removed from bilateral groins & dressed with quickclot, 4x4 & tegaderm bilaterally. Rawls removed & emptied of 1200 ml clear yellow urine.  Daughter at bedside

## 2021-04-29 ENCOUNTER — HOSPITAL ENCOUNTER (EMERGENCY)
Age: 81
Discharge: HOME | End: 2021-04-29
Attending: EMERGENCY MEDICINE
Payer: MEDICARE

## 2021-04-29 VITALS
WEIGHT: 138 LBS | HEART RATE: 77 BPM | RESPIRATION RATE: 18 BRPM | HEIGHT: 62 IN | OXYGEN SATURATION: 94 % | TEMPERATURE: 97.9 F | BODY MASS INDEX: 25.4 KG/M2 | DIASTOLIC BLOOD PRESSURE: 75 MMHG | SYSTOLIC BLOOD PRESSURE: 195 MMHG

## 2021-04-29 DIAGNOSIS — I10 HYPERTENSION, UNSPECIFIED TYPE: ICD-10-CM

## 2021-04-29 DIAGNOSIS — R30.0 DYSURIA: Primary | ICD-10-CM

## 2021-04-29 DIAGNOSIS — R10.84 ABDOMINAL PAIN, GENERALIZED: ICD-10-CM

## 2021-04-29 LAB
ALBUMIN SERPL-MCNC: 3.8 G/DL (ref 3.2–4.6)
ALBUMIN/GLOB SERPL: 1.1 {RATIO} (ref 1.2–3.5)
ALP SERPL-CCNC: 172 U/L (ref 50–136)
ALT SERPL-CCNC: 39 U/L (ref 12–65)
ANION GAP SERPL CALC-SCNC: 9 MMOL/L (ref 7–16)
AST SERPL-CCNC: 43 U/L (ref 15–37)
BACTERIA URNS QL MICRO: 0 /HPF
BASOPHILS # BLD: 0 K/UL (ref 0–0.2)
BASOPHILS NFR BLD: 0 % (ref 0–2)
BILIRUB SERPL-MCNC: 0.9 MG/DL (ref 0.2–1.1)
BUN SERPL-MCNC: 7 MG/DL (ref 8–23)
CALCIUM SERPL-MCNC: 9 MG/DL (ref 8.3–10.4)
CASTS URNS QL MICRO: NORMAL /LPF
CHLORIDE SERPL-SCNC: 92 MMOL/L (ref 98–107)
CO2 SERPL-SCNC: 27 MMOL/L (ref 21–32)
CREAT SERPL-MCNC: 0.64 MG/DL (ref 0.6–1)
DIFFERENTIAL METHOD BLD: ABNORMAL
EOSINOPHIL # BLD: 0.1 K/UL (ref 0–0.8)
EOSINOPHIL NFR BLD: 2 % (ref 0.5–7.8)
EPI CELLS #/AREA URNS HPF: 0 /HPF
ERYTHROCYTE [DISTWIDTH] IN BLOOD BY AUTOMATED COUNT: 14.9 % (ref 11.9–14.6)
GLOBULIN SER CALC-MCNC: 3.5 G/DL (ref 2.3–3.5)
GLUCOSE SERPL-MCNC: 116 MG/DL (ref 65–100)
HCT VFR BLD AUTO: 35.7 % (ref 35.8–46.3)
HGB BLD-MCNC: 11.8 G/DL (ref 11.7–15.4)
IMM GRANULOCYTES # BLD AUTO: 0 K/UL (ref 0–0.5)
IMM GRANULOCYTES NFR BLD AUTO: 0 % (ref 0–5)
LYMPHOCYTES # BLD: 1.3 K/UL (ref 0.5–4.6)
LYMPHOCYTES NFR BLD: 19 % (ref 13–44)
MCH RBC QN AUTO: 27.4 PG (ref 26.1–32.9)
MCHC RBC AUTO-ENTMCNC: 33.1 G/DL (ref 31.4–35)
MCV RBC AUTO: 83 FL (ref 79.6–97.8)
MONOCYTES # BLD: 0.8 K/UL (ref 0.1–1.3)
MONOCYTES NFR BLD: 12 % (ref 4–12)
NEUTS SEG # BLD: 4.7 K/UL (ref 1.7–8.2)
NEUTS SEG NFR BLD: 66 % (ref 43–78)
NRBC # BLD: 0 K/UL (ref 0–0.2)
PLATELET # BLD AUTO: 259 K/UL (ref 150–450)
PMV BLD AUTO: 9.5 FL (ref 9.4–12.3)
POTASSIUM SERPL-SCNC: 3.2 MMOL/L (ref 3.5–5.1)
PROT SERPL-MCNC: 7.3 G/DL (ref 6.3–8.2)
RBC # BLD AUTO: 4.3 M/UL (ref 4.05–5.2)
RBC #/AREA URNS HPF: NORMAL /HPF
SODIUM SERPL-SCNC: 128 MMOL/L (ref 136–145)
WBC # BLD AUTO: 7.1 K/UL (ref 4.3–11.1)
WBC URNS QL MICRO: NORMAL /HPF

## 2021-04-29 PROCEDURE — 99285 EMERGENCY DEPT VISIT HI MDM: CPT

## 2021-04-29 PROCEDURE — 85025 COMPLETE CBC W/AUTO DIFF WBC: CPT

## 2021-04-29 PROCEDURE — 87186 SC STD MICRODIL/AGAR DIL: CPT

## 2021-04-29 PROCEDURE — 81015 MICROSCOPIC EXAM OF URINE: CPT

## 2021-04-29 PROCEDURE — 80053 COMPREHEN METABOLIC PANEL: CPT

## 2021-04-29 PROCEDURE — 87086 URINE CULTURE/COLONY COUNT: CPT

## 2021-04-29 PROCEDURE — 87088 URINE BACTERIA CULTURE: CPT

## 2021-04-29 PROCEDURE — 81003 URINALYSIS AUTO W/O SCOPE: CPT

## 2021-04-29 RX ORDER — SODIUM CHLORIDE 0.9 % (FLUSH) 0.9 %
5-40 SYRINGE (ML) INJECTION EVERY 8 HOURS
Status: DISCONTINUED | OUTPATIENT
Start: 2021-04-29 | End: 2021-04-29 | Stop reason: HOSPADM

## 2021-04-29 RX ORDER — SODIUM CHLORIDE 0.9 % (FLUSH) 0.9 %
5-40 SYRINGE (ML) INJECTION AS NEEDED
Status: DISCONTINUED | OUTPATIENT
Start: 2021-04-29 | End: 2021-04-29 | Stop reason: HOSPADM

## 2021-04-29 NOTE — ED PROVIDER NOTES
Patient has a history of coronary artery disease, GERD, hypertension and hyperlipidemia complaining of abdominal pain and burning with urination. She states she was treated for urinary tract infection earlier in the month, has been on 2 different antibiotics. She has been taking her current antibiotics as prescribed. She states she started having pain and burning with urination again this morning. She vomited after taking her antibiotic and states she saw the pill in her vomitus. She describes the pain as \"like being on fire\" with dysuria. She states her abdominal pain is mainly in her lower abdomen with some occasional radiation to her back.            Past Medical History:   Diagnosis Date    Arrhythmia     FAST HEART BEAT    Arthritis     OSTEO    Bell's palsy 12/7/2015    Bell's palsy     Cancer ([facility] [address]     melona    Cataract  NOS 12/7/2015    Coronary artery disease 6/23/2009    GERD (gastroesophageal reflux disease)     ACID REFLUX    Hx of varicose vein ligation and stripping 5/16/2016    LEFT 30+ yrs ago    Hyperlipidemia 12/7/2015    Hypertension     Macular degeneration (senile), unspecified 12/7/2015    Nerve damage     from shingles    Osteoarthrosis involving multiple sites but not generalized 12/7/2015    Osteoporosis   NOS 12/7/2015    Varicose vein of leg        Past Surgical History:   Procedure Laterality Date    HX APPENDECTOMY      HX HEART CATHETERIZATION      with 1 stent    HX HYSTERECTOMY      HX OTHER SURGICAL      BREAST IMPLANTS, BLADDER TACT         Family History:   Problem Relation Age of Onset    Heart Attack Father     Heart Attack Sister     Cancer Sister         one sister has ovarian cancer    Diabetes Mother     Other Mother         vv    Diabetes Maternal Grandmother     Other Maternal Grandmother         vv    Heart Disease Paternal Grandmother     Heart Attack Paternal Uncle        Social History     Socioeconomic History    Marital status:      Spouse name: Not on file    Number of children: Not on file    Years of education: Not on file    Highest education level: Not on file   Occupational History    Not on file   Social Needs    Financial resource strain: Not on file    Food insecurity     Worry: Not on file     Inability: Not on file    Transportation needs     Medical: Not on file     Non-medical: Not on file   Tobacco Use    Smoking status: Never Smoker    Smokeless tobacco: Never Used   Substance and Sexual Activity    Alcohol use: Yes     Alcohol/week: 17.5 standard drinks     Types: 7 Glasses of wine, 14 Cans of beer per week    Drug use: No    Sexual activity: Not Currently     Birth control/protection: Surgical   Lifestyle    Physical activity     Days per week: Not on file     Minutes per session: Not on file    Stress: Not on file   Relationships    Social connections     Talks on phone: Not on file     Gets together: Not on file     Attends Restorationism service: Not on file     Active member of club or organization: Not on file     Attends meetings of clubs or organizations: Not on file     Relationship status: Not on file    Intimate partner violence     Fear of current or ex partner: Not on file     Emotionally abused: Not on file     Physically abused: Not on file     Forced sexual activity: Not on file   Other Topics Concern     Service No    Blood Transfusions No    Caffeine Concern No    Occupational Exposure No    Hobby Hazards No    Sleep Concern No    Stress Concern No    Weight Concern No    Special Diet No    Back Care No    Exercise Yes    Bike Helmet No    Seat Belt Yes    Self-Exams No   Social History Narrative    Not on file         ALLERGIES: Penicillins and Sulfa (sulfonamide antibiotics)    Review of Systems   Constitutional: Negative for chills and fever. Gastrointestinal: Positive for abdominal pain, nausea and vomiting. Genitourinary: Positive for dysuria.    All other systems reviewed and are negative. Vitals:    04/29/21 1731   BP: (!) 193/79   Pulse: 81   Resp: 20   Temp: 97.9 °F (36.6 °C)   SpO2: 93%   Weight: 62.6 kg (138 lb)   Height: 5' 2\" (1.575 m)            Physical Exam  Vitals signs and nursing note reviewed. Constitutional:       Appearance: Normal appearance. She is well-developed. HENT:      Head: Normocephalic and atraumatic. Eyes:      Conjunctiva/sclera: Conjunctivae normal.      Pupils: Pupils are equal, round, and reactive to light. Neck:      Musculoskeletal: Normal range of motion and neck supple. Pulmonary:      Effort: Pulmonary effort is normal. No respiratory distress. Abdominal:      General: Bowel sounds are normal. There is no distension. Palpations: Abdomen is soft. Tenderness: There is abdominal tenderness. There is no guarding. Comments: Mild tenderness to palpation lower abdomen as indicated   Musculoskeletal:         General: No tenderness. Right lower leg: No edema. Left lower leg: No edema. Skin:     General: Skin is warm and dry. Neurological:      Mental Status: She is alert and oriented to person, place, and time. Psychiatric:         Behavior: Behavior normal.          MDM  Number of Diagnoses or Management Options  Abdominal pain, generalized: new and does not require workup  Dysuria  Hypertension, unspecified type  Diagnosis management comments: 8:09 PM discussed results with patient, unremarkable labs. Her urinalysis shows no evidence of urinary tract infection. She appears comfortable and in no distress. She states she takes Prilosec twice a day for heartburn. She also states she finished taking her Keflex earlier today. She has a primary doctor she can follow-up with. Her repeat abdominal exam is completely benign. She is tolerating water without difficulty and appears comfortable.        Amount and/or Complexity of Data Reviewed  Clinical lab tests: ordered and reviewed    Risk of Complications, Morbidity, and/or Mortality  Presenting problems: moderate  Diagnostic procedures: moderate  Management options: moderate    Patient Progress  Patient progress: improved         Procedures

## 2021-04-29 NOTE — ED TRIAGE NOTES
Pt arrives via EMS from home with c/o UTI/bladder infection for at least 21 days and has been on 2 antibiotics since but no relief still. Lower abd pain and now radiating to lower back. Denies burning with urination. Masked. Pt states today she threw up her last antibiotic.

## 2021-04-30 NOTE — ED NOTES
I have reviewed discharge instructions with the patient. The patient verbalized understanding. Patient left ED via Discharge Method: ambulatory to Home with uber calld by us. Opportunity for questions and clarification provided. Patient given 0 scripts. To continue your aftercare when you leave the hospital, you may receive an automated call from our care team to check in on how you are doing. This is a free service and part of our promise to provide the best care and service to meet your aftercare needs.  If you have questions, or wish to unsubscribe from this service please call [phone]. Thank you for Choosing our [facility] Emergency Department.

## 2021-05-02 LAB
BACTERIA SPEC CULT: ABNORMAL
BACTERIA SPEC CULT: ABNORMAL
SERVICE CMNT-IMP: ABNORMAL

## 2021-08-18 PROBLEM — N95.2 VAGINAL ATROPHY: Status: ACTIVE | Noted: 2021-08-18

## 2021-08-18 PROBLEM — N30.10 INTERSTITIAL CYSTITIS: Status: ACTIVE | Noted: 2021-08-18

## 2022-03-18 PROBLEM — R20.2 PARESTHESIA: Status: ACTIVE | Noted: 2021-02-06

## 2022-03-18 PROBLEM — Z95.5 H/O HEART ARTERY STENT: Status: ACTIVE | Noted: 2018-03-27

## 2022-03-18 PROBLEM — N95.2 VAGINAL ATROPHY: Status: ACTIVE | Noted: 2021-08-18

## 2022-03-19 PROBLEM — E87.6 HYPOKALEMIA: Status: ACTIVE | Noted: 2021-02-06

## 2022-03-19 PROBLEM — I44.7 LBBB (LEFT BUNDLE BRANCH BLOCK): Status: ACTIVE | Noted: 2018-03-27

## 2022-03-19 PROBLEM — I10 ESSENTIAL HYPERTENSION: Status: ACTIVE | Noted: 2018-03-27

## 2022-03-19 PROBLEM — N30.10 INTERSTITIAL CYSTITIS: Status: ACTIVE | Noted: 2021-08-18

## 2022-03-19 PROBLEM — R29.810 FACIAL DROOP: Status: ACTIVE | Noted: 2021-02-06

## 2022-03-20 PROBLEM — I47.19 ATRIAL TACHYCARDIA: Status: ACTIVE | Noted: 2021-02-05

## 2022-03-20 PROBLEM — I47.1 ATRIAL TACHYCARDIA (HCC): Status: ACTIVE | Noted: 2021-02-05

## 2023-03-28 ENCOUNTER — TELEPHONE (OUTPATIENT)
Dept: CARDIOLOGY CLINIC | Age: 83
End: 2023-03-28

## 2023-03-28 RX ORDER — LOSARTAN POTASSIUM 100 MG/1
TABLET ORAL
Qty: 30 TABLET | Refills: 0 | Status: SHIPPED | OUTPATIENT
Start: 2023-03-28

## 2023-03-28 NOTE — TELEPHONE ENCOUNTER
MEDICATION REFILL REQUEST      Name of Medication:  Losartan   Dose:  100 mg  Frequency:    Quantity:    Days' supply:  80      Pharmacy Name/Location:  did not leave

## 2023-03-28 NOTE — TELEPHONE ENCOUNTER
LVM notifying pt that will be given 90 day supply once appt is made. Pt not seen since 2021. Made aware that Phone room can make appt.

## 2023-04-04 ENCOUNTER — TELEPHONE (OUTPATIENT)
Dept: CARDIOLOGY CLINIC | Age: 83
End: 2023-04-04

## 2023-04-04 NOTE — TELEPHONE ENCOUNTER
Xarelto   Losartan    [facility] on [address]     Pt daughter has called to try to make an appt but hasn't been able to get anyone.

## 2023-04-05 ENCOUNTER — TELEPHONE (OUTPATIENT)
Dept: CARDIOLOGY CLINIC | Age: 83
End: 2023-04-05

## 2023-04-05 RX ORDER — LOSARTAN POTASSIUM 100 MG/1
100 TABLET ORAL DAILY
Qty: 90 TABLET | Refills: 0 | Status: SHIPPED | OUTPATIENT
Start: 2023-04-05

## 2023-04-05 NOTE — TELEPHONE ENCOUNTER
Called patient to schedule appointment. Patient requested her daughter be called to make the appointment. Patient's daughter called. No answer-LVM to return call to schedule patient.

## 2023-04-06 NOTE — TELEPHONE ENCOUNTER
Requested Prescriptions     Signed Prescriptions Disp Refills    losartan (COZAAR) 100 MG tablet 90 tablet 0     Sig: Take 1 tablet by mouth daily     Authorizing Provider: AIDAN VAZQUEZ    rivaroxaban (XARELTO) 20 MG TABS tablet 90 tablet 0     Sig: Take 1 tablet by mouth Daily with supper     Authorizing Provider: Brad Lagunas

## 2023-06-08 ENCOUNTER — OFFICE VISIT (OUTPATIENT)
Age: 83
End: 2023-06-08
Payer: MEDICARE

## 2023-06-08 VITALS
BODY MASS INDEX: 23 KG/M2 | SYSTOLIC BLOOD PRESSURE: 128 MMHG | HEART RATE: 70 BPM | WEIGHT: 125 LBS | DIASTOLIC BLOOD PRESSURE: 86 MMHG | HEIGHT: 62 IN

## 2023-06-08 DIAGNOSIS — I10 ESSENTIAL HYPERTENSION: ICD-10-CM

## 2023-06-08 DIAGNOSIS — I47.1 SUPRAVENTRICULAR TACHYCARDIA (HCC): Primary | ICD-10-CM

## 2023-06-08 DIAGNOSIS — I47.1 ATRIAL TACHYCARDIA (HCC): ICD-10-CM

## 2023-06-08 PROCEDURE — 1123F ACP DISCUSS/DSCN MKR DOCD: CPT | Performed by: INTERNAL MEDICINE

## 2023-06-08 PROCEDURE — 3079F DIAST BP 80-89 MM HG: CPT | Performed by: INTERNAL MEDICINE

## 2023-06-08 PROCEDURE — 93000 ELECTROCARDIOGRAM COMPLETE: CPT | Performed by: INTERNAL MEDICINE

## 2023-06-08 PROCEDURE — 99213 OFFICE O/P EST LOW 20 MIN: CPT | Performed by: INTERNAL MEDICINE

## 2023-06-08 PROCEDURE — 3074F SYST BP LT 130 MM HG: CPT | Performed by: INTERNAL MEDICINE

## 2023-06-08 RX ORDER — OMEPRAZOLE 40 MG/1
CAPSULE, DELAYED RELEASE ORAL
COMMUNITY
Start: 2023-05-02

## 2023-06-08 RX ORDER — TRAMADOL HYDROCHLORIDE 50 MG/1
TABLET ORAL
COMMUNITY
Start: 2023-06-02

## 2023-06-08 RX ORDER — LATANOPROST 50 UG/ML
SOLUTION/ DROPS OPHTHALMIC
COMMUNITY
Start: 2023-06-01

## 2023-06-08 RX ORDER — LORAZEPAM 0.5 MG/1
0.5 TABLET ORAL DAILY
COMMUNITY
Start: 2023-03-27

## 2023-06-08 RX ORDER — MELOXICAM 15 MG/1
TABLET ORAL
COMMUNITY
Start: 2021-09-24

## 2023-06-08 RX ORDER — ROSUVASTATIN CALCIUM 40 MG/1
TABLET, COATED ORAL
COMMUNITY
Start: 2023-05-02

## 2023-06-08 RX ORDER — PREGABALIN 150 MG/1
CAPSULE ORAL
COMMUNITY
Start: 2023-05-02

## 2023-06-08 NOTE — PROGRESS NOTES
instructed and agrees to call our office with any issues or other concerns related to their cardiac condition(s) and/or complaint(s). Return in about 1 year (around 6/8/2024). Duc Patiño MD, MS  Cardiology/Electrophysiology  6/8/2023  4:58 PM

## 2023-07-24 RX ORDER — LOSARTAN POTASSIUM 100 MG/1
TABLET ORAL
Qty: 90 TABLET | Refills: 3 | Status: SHIPPED | OUTPATIENT
Start: 2023-07-24

## 2023-08-22 ENCOUNTER — TELEPHONE (OUTPATIENT)
Age: 83
End: 2023-08-22

## 2023-08-22 NOTE — TELEPHONE ENCOUNTER
Pt can not afford xarelto 40 mg is just cost to much,she is wanting to know if Dr Phoebe Ann came prescribe her a different medication please call her at [phone]

## 2023-08-23 NOTE — TELEPHONE ENCOUNTER
Spoke with pt & recommended that she check with [facility] pharmacy to see if more affordable. Pt asked that I send info for the pharmacies in a [employer] message so that her daughter can look into it.

## 2023-08-24 ENCOUNTER — TELEPHONE (OUTPATIENT)
Age: 83
End: 2023-08-24

## 2023-08-24 NOTE — TELEPHONE ENCOUNTER
Pt daughter Rachel Rodriguez is calling wanting the info for the [address] [place] [address] from  [employer]. Mau would appreciate a call back.

## 2023-08-25 ENCOUNTER — HOSPITAL ENCOUNTER (OUTPATIENT)
Dept: GENERAL RADIOLOGY | Age: 83
Discharge: HOME | End: 2023-08-28

## 2023-08-25 ENCOUNTER — TELEPHONE (OUTPATIENT)
Age: 83
End: 2023-08-25

## 2023-08-25 DIAGNOSIS — R07.89 CHEST DISCOMFORT: ICD-10-CM

## 2023-08-25 DIAGNOSIS — R05.9 COUGH, UNSPECIFIED TYPE: ICD-10-CM

## 2023-08-30 ENCOUNTER — TELEPHONE (OUTPATIENT)
Age: 83
End: 2023-08-30

## 2023-08-30 NOTE — TELEPHONE ENCOUNTER
MEDICATION REFILL REQUEST      Name of Medication:  Xarelto   Dose:    Frequency:    Quantity:    Days' supply:  80      Pharmacy Name/Location:  [place] [facility]/ [address] [place] [address] # [id number] please send ASAP

## 2023-09-11 ENCOUNTER — TELEPHONE (OUTPATIENT)
Age: 83
End: 2023-09-11

## 2023-09-11 NOTE — TELEPHONE ENCOUNTER
MEDICATION REFILL REQUEST      Name of Medication:  Xarelto   Dose:  20 mg  Frequency:    Quantity:    Days' supply:  80      Pharmacy Name/Location:  did not leave

## 2023-09-12 ENCOUNTER — TELEPHONE (OUTPATIENT)
Age: 83
End: 2023-09-12

## 2023-09-12 NOTE — TELEPHONE ENCOUNTER
Patient is wondering what the status of her RX is for Xarelto she ordered it through [facility].

## 2023-09-12 NOTE — TELEPHONE ENCOUNTER
Notified pt that she will need to call the [address] [place] [address] for status of her medication. Also notified pt that samples of xarelto 20 mg are available for pu at [place] office . Understanding voiced.

## 2023-09-26 NOTE — PROGRESS NOTES
Late entry due to patient care:    Upon entering patient's room patient stated she is not feeling right and her face is numb. NIH completed and a left sided droop was noted when patient smiled. Patient does have a history of Bell's palsy but patient stated this feels different than her Bell's palsy. Code S called. Dr. Reginald Watson notified and orders to follow the code S protocol. Cimzia Pregnancy And Lactation Text: This medication crosses the placenta but can be considered safe in certain situations. Cimzia may be excreted in breast milk.

## 2023-11-01 ENCOUNTER — TELEPHONE (OUTPATIENT)
Age: 83
End: 2023-11-01

## 2023-11-01 NOTE — TELEPHONE ENCOUNTER
Xarelto patient is almost of it and she has been waiting for over 2 months for an order to come in from [facility]. She said it is so expensive she can't afford it, she would like samples.

## 2023-11-02 NOTE — TELEPHONE ENCOUNTER
I spoke with [facility] who states that Rx was shipped & received by post office but there is nothing past that & that was in Sept. Pharmacy is going to check into this further & contact pt with the outcome. I lvm letting pt know this & that we have no xarelto samples at this time but that she can check back next week if she'd like.

## 2024-01-27 ENCOUNTER — APPOINTMENT (OUTPATIENT)
Dept: CT IMAGING | Age: 84
End: 2024-01-27
Payer: MEDICARE

## 2024-01-27 ENCOUNTER — HOSPITAL ENCOUNTER (EMERGENCY)
Age: 84
Discharge: HOME | End: 2024-01-27
Attending: INTERNAL MEDICINE | Admitting: INTERNAL MEDICINE
Payer: MEDICARE

## 2024-01-27 VITALS
OXYGEN SATURATION: 93 % | SYSTOLIC BLOOD PRESSURE: 160 MMHG | HEIGHT: 61 IN | RESPIRATION RATE: 16 BRPM | WEIGHT: 115 LBS | TEMPERATURE: 98.2 F | BODY MASS INDEX: 21.71 KG/M2 | DIASTOLIC BLOOD PRESSURE: 66 MMHG | HEART RATE: 74 BPM

## 2024-01-27 DIAGNOSIS — K40.90 NON-RECURRENT UNILATERAL INGUINAL HERNIA WITHOUT OBSTRUCTION OR GANGRENE: Primary | ICD-10-CM

## 2024-01-27 LAB
ALBUMIN SERPL-MCNC: 4 G/DL (ref 3.2–4.6)
ALBUMIN/GLOB SERPL: 1.1 (ref 0.4–1.6)
ALP SERPL-CCNC: 80 U/L (ref 50–136)
ALT SERPL-CCNC: 22 U/L (ref 12–65)
ANION GAP SERPL CALC-SCNC: 2 MMOL/L (ref 2–11)
APPEARANCE UR: CLEAR
AST SERPL-CCNC: 28 U/L (ref 15–37)
BASOPHILS # BLD: 0.1 K/UL (ref 0–0.2)
BASOPHILS NFR BLD: 1 % (ref 0–2)
BILIRUB SERPL-MCNC: 0.7 MG/DL (ref 0.2–1.1)
BILIRUB UR QL: NEGATIVE
BILIRUB UR QL: NEGATIVE
BUN SERPL-MCNC: 7 MG/DL (ref 8–23)
CALCIUM SERPL-MCNC: 8.9 MG/DL (ref 8.3–10.4)
CHLORIDE SERPL-SCNC: 107 MMOL/L (ref 103–113)
CO2 SERPL-SCNC: 31 MMOL/L (ref 21–32)
COLOR UR: NORMAL
CREAT SERPL-MCNC: 0.7 MG/DL (ref 0.6–1)
DIFFERENTIAL METHOD BLD: NORMAL
EOSINOPHIL # BLD: 0.2 K/UL (ref 0–0.8)
EOSINOPHIL NFR BLD: 2 % (ref 0.5–7.8)
ERYTHROCYTE [DISTWIDTH] IN BLOOD BY AUTOMATED COUNT: 14.1 % (ref 11.9–14.6)
GLOBULIN SER CALC-MCNC: 3.5 G/DL (ref 2.8–4.5)
GLUCOSE SERPL-MCNC: 107 MG/DL (ref 65–100)
GLUCOSE UR QL STRIP.AUTO: NEGATIVE MG/DL
GLUCOSE UR STRIP.AUTO-MCNC: NEGATIVE MG/DL
HCT VFR BLD AUTO: 42.1 % (ref 35.8–46.3)
HGB BLD-MCNC: 13.4 G/DL (ref 11.7–15.4)
HGB UR QL STRIP: NEGATIVE
IMM GRANULOCYTES # BLD AUTO: 0 K/UL (ref 0–0.5)
IMM GRANULOCYTES NFR BLD AUTO: 0 % (ref 0–5)
KETONES UR QL STRIP.AUTO: NEGATIVE MG/DL
KETONES UR-MCNC: NEGATIVE MG/DL
LACTATE SERPL-SCNC: 1 MMOL/L (ref 0.4–2)
LEUKOCYTE ESTERASE UR QL STRIP.AUTO: NEGATIVE
LEUKOCYTE ESTERASE UR QL STRIP: NEGATIVE
LIPASE SERPL-CCNC: 85 U/L (ref 73–393)
LYMPHOCYTES # BLD: 1.5 K/UL (ref 0.5–4.6)
LYMPHOCYTES NFR BLD: 20 % (ref 13–44)
MCH RBC QN AUTO: 27.5 PG (ref 26.1–32.9)
MCHC RBC AUTO-ENTMCNC: 31.8 G/DL (ref 31.4–35)
MCV RBC AUTO: 86.4 FL (ref 82–102)
MONOCYTES # BLD: 0.5 K/UL (ref 0.1–1.3)
MONOCYTES NFR BLD: 7 % (ref 4–12)
NEUTS SEG # BLD: 5.4 K/UL (ref 1.7–8.2)
NEUTS SEG NFR BLD: 70 % (ref 43–78)
NITRITE UR QL STRIP.AUTO: NEGATIVE
NITRITE UR QL: NEGATIVE
NRBC # BLD: 0 K/UL (ref 0–0.2)
PH UR STRIP: 7 (ref 5–9)
PH UR: 7 (ref 5–9)
PLATELET # BLD AUTO: 188 K/UL (ref 150–450)
PMV BLD AUTO: 11.5 FL (ref 9.4–12.3)
POTASSIUM SERPL-SCNC: 3.4 MMOL/L (ref 3.5–5.1)
PROT SERPL-MCNC: 7.5 G/DL (ref 6.3–8.2)
PROT UR QL: NEGATIVE MG/DL
PROT UR STRIP-MCNC: NEGATIVE MG/DL
RBC # BLD AUTO: 4.87 M/UL (ref 4.05–5.2)
RBC # UR STRIP: ABNORMAL
SERVICE CMNT-IMP: ABNORMAL
SODIUM SERPL-SCNC: 140 MMOL/L (ref 136–146)
SP GR UR REFRACTOMETRY: 1 (ref 1–1.02)
SP GR UR: 1.01 (ref 1–1.02)
UROBILINOGEN UR QL STRIP.AUTO: 0.2 EU/DL (ref 0.2–1)
UROBILINOGEN UR QL: 0.2 EU/DL (ref 0.2–1)
WBC # BLD AUTO: 7.7 K/UL (ref 4.3–11.1)

## 2024-01-27 PROCEDURE — 6370000000 HC RX 637 (ALT 250 FOR IP)

## 2024-01-27 PROCEDURE — 6360000004 HC RX CONTRAST MEDICATION

## 2024-01-27 PROCEDURE — 81003 URINALYSIS AUTO W/O SCOPE: CPT

## 2024-01-27 PROCEDURE — 6360000002 HC RX W HCPCS

## 2024-01-27 PROCEDURE — 85025 COMPLETE CBC W/AUTO DIFF WBC: CPT

## 2024-01-27 PROCEDURE — 80053 COMPREHEN METABOLIC PANEL: CPT

## 2024-01-27 PROCEDURE — 83605 ASSAY OF LACTIC ACID: CPT

## 2024-01-27 PROCEDURE — 99285 EMERGENCY DEPT VISIT HI MDM: CPT

## 2024-01-27 PROCEDURE — 96374 THER/PROPH/DIAG INJ IV PUSH: CPT

## 2024-01-27 PROCEDURE — 83690 ASSAY OF LIPASE: CPT

## 2024-01-27 PROCEDURE — 74177 CT ABD & PELVIS W/CONTRAST: CPT

## 2024-01-27 RX ORDER — TRAMADOL HYDROCHLORIDE 50 MG/1
50 TABLET ORAL EVERY 6 HOURS PRN
Qty: 10 TABLET | Refills: 0 | Status: SHIPPED | OUTPATIENT
Start: 2024-01-27 | End: 2024-02-03

## 2024-01-27 RX ORDER — HYDROCODONE BITARTRATE AND ACETAMINOPHEN 5; 325 MG/1; MG/1
1 TABLET ORAL
Status: COMPLETED | OUTPATIENT
Start: 2024-01-27 | End: 2024-01-27

## 2024-01-27 RX ORDER — ONDANSETRON 2 MG/ML
4 INJECTION INTRAMUSCULAR; INTRAVENOUS
Status: COMPLETED | OUTPATIENT
Start: 2024-01-27 | End: 2024-01-27

## 2024-01-27 RX ADMIN — ONDANSETRON 4 MG: 2 INJECTION INTRAMUSCULAR; INTRAVENOUS at 11:56

## 2024-01-27 RX ADMIN — IOPAMIDOL 100 ML: 755 INJECTION, SOLUTION INTRAVENOUS at 12:51

## 2024-01-27 RX ADMIN — HYDROCODONE BITARTRATE AND ACETAMINOPHEN 1 TABLET: 5; 325 TABLET ORAL at 11:56

## 2024-01-27 RX ADMIN — DIATRIZOATE MEGLUMINE AND DIATRIZOATE SODIUM 15 ML: 660; 100 LIQUID ORAL; RECTAL at 11:56

## 2024-01-27 ASSESSMENT — PAIN DESCRIPTION - ORIENTATION: ORIENTATION: LEFT

## 2024-01-27 ASSESSMENT — PAIN SCALES - GENERAL: PAINLEVEL_OUTOF10: 7

## 2024-01-27 ASSESSMENT — ENCOUNTER SYMPTOMS
NAUSEA: 0
ABDOMINAL PAIN: 1
COUGH: 0
VOMITING: 0
SHORTNESS OF BREATH: 0

## 2024-01-27 ASSESSMENT — PAIN DESCRIPTION - LOCATION: LOCATION: GROIN

## 2024-01-27 NOTE — DISCHARGE INSTRUCTIONS
It is very important that you follow-up with general surgery in order to discuss your options for hernia repair. Only take the pain medication as needed. Remember that it may make you drowsy so do not drive or operate machinery on this medication. You may alternate it with tylenol and ibuprofen.  Return here should it change or worsen as discussed such as you are unable to reduce it, your pain is significant, or if you have any further concerns.

## 2024-01-27 NOTE — ED PROVIDER NOTES
Result Value Ref Range    Specific Gravity, Urine, POC 1.010 1.001 - 1.023      pH, Urine, POC 7.0 5.0 - 9.0      Protein, Urine, POC Negative NEG mg/dL    Glucose, UA POC Negative NEG mg/dL    Ketones, Urine, POC Negative NEG mg/dL    Bilirubin, Urine, POC Negative NEG      Blood, UA POC Trace Intact (A) NEG      URINE UROBILINOGEN POC 0.2 0.2 - 1.0 EU/dL    Nitrite, Urine, POC Negative NEG      Leukocyte Est, UA POC Negative NEG      Performed by: Cisco Smith         CT ABDOMEN PELVIS W IV CONTRAST Additional Contrast? Oral   Final Result   No acute findings in the abdomen or pelvis. No left inguinal/femoral hernia   identified.                           Voice dictation software was used during the making of this note.  This software is not perfect and grammatical and other typographical errors may be present.  This note has not been completely proofread for errors.        Kartik Fofana PA  01/27/24 8813

## 2024-01-27 NOTE — ED NOTES
I have reviewed discharge instructions with the patient.  The patient verbalized understanding.    Patient left ED via Discharge Method: ambulatory to Home with self.    Opportunity for questions and clarification provided.       Patient given 1 scripts.         To continue your aftercare when you leave the hospital, you may receive an automated call from our care team to check in on how you are doing.  This is a free service and part of our promise to provide the best care and service to meet your aftercare needs.” If you have questions, or wish to unsubscribe from this service please call [phone].  Thank you for Choosing our [facility] Emergency Department.       Cisco Smith, SPEEDY  01/27/24 7950

## 2024-01-27 NOTE — ED TRIAGE NOTES
Patient a 82 y/o Female reports to the ED with c/c of left groin pain  states she has felt a bulge for approx 2 months. Has nausea. But denies any diarrhea. Hx of HTN. Did not take her BP meds this morning,.

## 2024-01-31 ENCOUNTER — OFFICE VISIT (OUTPATIENT)
Dept: SURGERY | Age: 84
End: 2024-01-31
Payer: MEDICARE

## 2024-01-31 VITALS
HEART RATE: 69 BPM | WEIGHT: 110.7 LBS | SYSTOLIC BLOOD PRESSURE: 156 MMHG | DIASTOLIC BLOOD PRESSURE: 100 MMHG | BODY MASS INDEX: 20.92 KG/M2

## 2024-01-31 DIAGNOSIS — R13.19 ESOPHAGEAL DYSPHAGIA: ICD-10-CM

## 2024-01-31 DIAGNOSIS — K80.20 GALLSTONES: ICD-10-CM

## 2024-01-31 DIAGNOSIS — K40.90 LEFT INGUINAL HERNIA: Primary | ICD-10-CM

## 2024-01-31 DIAGNOSIS — R11.0 NAUSEA: ICD-10-CM

## 2024-01-31 PROCEDURE — 3077F SYST BP >= 140 MM HG: CPT | Performed by: SURGERY

## 2024-01-31 PROCEDURE — 3080F DIAST BP >= 90 MM HG: CPT | Performed by: SURGERY

## 2024-01-31 PROCEDURE — 99203 OFFICE O/P NEW LOW 30 MIN: CPT | Performed by: SURGERY

## 2024-01-31 PROCEDURE — 1123F ACP DISCUSS/DSCN MKR DOCD: CPT | Performed by: SURGERY

## 2024-01-31 ASSESSMENT — ENCOUNTER SYMPTOMS
RESPIRATORY NEGATIVE: 1
EYES NEGATIVE: 1
ALLERGIC/IMMUNOLOGIC NEGATIVE: 1
GASTROINTESTINAL NEGATIVE: 1

## 2024-01-31 NOTE — PROGRESS NOTES
and mid abdominal pain after eating.  I have recommended evaluation by gastroenterology due to the dysphagia.  Both of these could be contributing to her weight loss.  In the interim I have asked her to trial a hernia truss for the inguinal hernia to see if this helps with any of the discomfort.  Once we have had the other testing/procedures done we can discuss potential hernia repair.  ASSESSMENT/PLAN:  Karina was seen today for new patient.    Diagnoses and all orders for this visit:    Left inguinal hernia    Esophageal dysphagia    Nausea    Gallstones      Follow-up after HIDA scan and EGD.    Cristi Haney Jr, MD

## 2024-04-25 ENCOUNTER — HOSPITAL ENCOUNTER (OUTPATIENT)
Dept: NUCLEAR MEDICINE | Age: 84
Discharge: HOME | End: 2024-04-25
Attending: SURGERY
Payer: MEDICARE

## 2024-04-25 VITALS — WEIGHT: 110 LBS | BODY MASS INDEX: 20.78 KG/M2

## 2024-04-25 DIAGNOSIS — R11.0 NAUSEA: ICD-10-CM

## 2024-04-25 DIAGNOSIS — K80.20 GALLSTONES: ICD-10-CM

## 2024-04-25 PROCEDURE — 6360000004 HC RX CONTRAST MEDICATION: Performed by: SURGERY

## 2024-04-25 PROCEDURE — A9537 TC99M MEBROFENIN: HCPCS | Performed by: SURGERY

## 2024-04-25 PROCEDURE — 3430000000 HC RX DIAGNOSTIC RADIOPHARMACEUTICAL: Performed by: SURGERY

## 2024-04-25 PROCEDURE — 78227 HEPATOBIL SYST IMAGE W/DRUG: CPT

## 2024-04-25 RX ORDER — KIT FOR THE PREPARATION OF TECHNETIUM TC 99M MEBROFENIN 45 MG/10ML
6.2 INJECTION, POWDER, LYOPHILIZED, FOR SOLUTION INTRAVENOUS
Status: COMPLETED | OUTPATIENT
Start: 2024-04-25 | End: 2024-04-25

## 2024-04-25 RX ORDER — SINCALIDE 5 UG/5ML
0.02 INJECTION, POWDER, LYOPHILIZED, FOR SOLUTION INTRAVENOUS ONCE
Status: COMPLETED | OUTPATIENT
Start: 2024-04-25 | End: 2024-04-25

## 2024-04-25 RX ADMIN — MEBROFENIN 6.2 MILLICURIE: 45 INJECTION, POWDER, LYOPHILIZED, FOR SOLUTION INTRAVENOUS at 11:12

## 2024-04-25 RX ADMIN — SINCALIDE 1 MCG: 5 INJECTION, POWDER, LYOPHILIZED, FOR SOLUTION INTRAVENOUS at 12:03

## 2024-05-30 ENCOUNTER — PREP FOR PROCEDURE (OUTPATIENT)
Dept: SURGERY | Age: 84
End: 2024-05-30

## 2024-05-30 PROBLEM — K80.20 GALLSTONE: Status: ACTIVE | Noted: 2024-05-30

## 2024-06-04 ENCOUNTER — TELEPHONE (OUTPATIENT)
Age: 84
End: 2024-06-04

## 2024-06-04 NOTE — TELEPHONE ENCOUNTER
Notified that pt will need appt for cardiac clx since it has been a year since she has been seen. Understanding voiced. They will notify pt that she needs appt with our office.

## 2024-06-04 NOTE — TELEPHONE ENCOUNTER
Patient having laparoscopic Cholecystectomy on 06/26/24 with Dr. Haney. Requesting risk assessment and Xarelto hold for 3 days prior. Fax: [phone] ATTN: [facility]

## 2024-06-05 ENCOUNTER — TELEPHONE (OUTPATIENT)
Age: 84
End: 2024-06-05

## 2024-06-05 NOTE — TELEPHONE ENCOUNTER
Cardiac Clearance        Physician or Practice Requesting: Dr Cristi Haney  : [facility]  Contact Phone Number: [phone]  Fax Number: NA  Date of Surgery/Procedure: 6/26/24  Type of Surgery or Procedure: Lapscopic Colesectomy  Type of Anesthesia: General  Type of Clearance Requested: Medication Hold Only  Medication to Hold: Xarelto  Days to Hold: 5 Days Prior

## 2024-06-19 RX ORDER — LOSARTAN POTASSIUM 100 MG/1
TABLET ORAL
Qty: 90 TABLET | Refills: 0 | Status: SHIPPED | OUTPATIENT
Start: 2024-06-19 | End: 2024-06-20 | Stop reason: SDUPTHER

## 2024-06-20 ENCOUNTER — OFFICE VISIT (OUTPATIENT)
Age: 84
End: 2024-06-20
Payer: MEDICARE

## 2024-06-20 VITALS
DIASTOLIC BLOOD PRESSURE: 86 MMHG | HEIGHT: 61 IN | BODY MASS INDEX: 18.67 KG/M2 | HEART RATE: 71 BPM | SYSTOLIC BLOOD PRESSURE: 180 MMHG | WEIGHT: 98.9 LBS

## 2024-06-20 DIAGNOSIS — I44.7 LBBB (LEFT BUNDLE BRANCH BLOCK): ICD-10-CM

## 2024-06-20 DIAGNOSIS — I47.19 ATRIAL TACHYCARDIA (HCC): Primary | ICD-10-CM

## 2024-06-20 DIAGNOSIS — I10 ESSENTIAL HYPERTENSION: ICD-10-CM

## 2024-06-20 PROCEDURE — 3077F SYST BP >= 140 MM HG: CPT | Performed by: NURSE PRACTITIONER

## 2024-06-20 PROCEDURE — 99214 OFFICE O/P EST MOD 30 MIN: CPT | Performed by: NURSE PRACTITIONER

## 2024-06-20 PROCEDURE — 1123F ACP DISCUSS/DSCN MKR DOCD: CPT | Performed by: NURSE PRACTITIONER

## 2024-06-20 PROCEDURE — 3079F DIAST BP 80-89 MM HG: CPT | Performed by: NURSE PRACTITIONER

## 2024-06-20 PROCEDURE — 93000 ELECTROCARDIOGRAM COMPLETE: CPT | Performed by: NURSE PRACTITIONER

## 2024-06-20 RX ORDER — LOSARTAN POTASSIUM 100 MG/1
100 TABLET ORAL DAILY
Qty: 90 TABLET | Refills: 1 | Status: SHIPPED | OUTPATIENT
Start: 2024-06-20

## 2024-06-20 NOTE — PROGRESS NOTES
[facility], [address], [address]  [place] [address]  PHONE: [phone]  1940    SUBJECTIVE:   Karina Aaron is a 83 y.o. female seen for a follow up visit regarding the following:     Chief Complaint   Patient presents with    Cardiac Clearance    Atrial Fibrillation       HPI:    Karina Aaron is a very pleasant 83 y.o. female with a past medical and cardiac history significant for CAD, GERD, HTN and HLD, LBBB, atrial tachycardia, afib, s/p ablation  and presents for follow up. Denies chest pain, shortness of breath or palpitations.  Notes weight loss and poor appetite, has cholecystectomy scheduled for next week.         Cardiac PMH: (Old records have been reviewed and summarized below)  2021: Echo showed EF 60-65%, no regional wall motion abnormalities, LAE    mild to moderate mitral regurgitation, mild tricuspid regurgitation     [facility] 3/2021:  1.  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  2.  Mild diffuse coronary atherosclerotic heart disease.  3.  Preserved LV systolic function.    Past Medical History, Past Surgical History, Family history, Social History, and Medications were all reviewed with the patient today and updated as necessary.     Current Outpatient Medications   Medication Sig Dispense Refill    diclofenac sodium (VOLTAREN) 1 % GEL Apply 2 g topically 4 times daily as needed for Pain      losartan (COZAAR) 100 MG tablet Take 1 tablet by mouth once daily (Patient taking differently: Take 1 tablet by mouth daily) 90 tablet 0    rivaroxaban (XARELTO) 20 MG TABS tablet Take 1 tablet by mouth Daily with supper (Patient taking differently: Take 1 tablet by mouth Daily with supper Takes daily at supper time) 21 tablet 0    LORazepam (ATIVAN) 0.5 MG tablet Take 1 tablet by mouth every 6 hours as needed.      latanoprost (XALATAN) 0.005 % ophthalmic solution INSTILL 1 DROP INTO EACH EYE ONCE DAILY AT NIGHT      pregabalin (LYRICA) 150 MG capsule Take 1

## 2024-06-20 NOTE — PROGRESS NOTES
Patient verified name and .  Order for consent  found in EHR and matches case posting; patient verifies procedure.   Type 1b surgery, phone assessment complete.  Orders not received.  Labs per surgeon: unknown, no orders  Labs per anesthesia protocol :  none    Patient answered medical/surgical history questions at their best of ability. All prior to admission medications documented in EPIC.    Patient instructed to continue taking all prescription medications up to the day of surgery but to take only the following medications the day of surgery according to anesthesia guidelines with a small sip of water: Coreg, Prilosec, Lyrica    Patient informed that all vitamins and supplements should be held 7 days prior to surgery and NSAIDS 5 days prior to surgery. Prescription meds to hold: Volataren gel hold for 5 days prior to surgery.    Patient instructed on the following:    > Arrive at A Entrance, time of arrival to be called the day before by 1700  > NPO after midnight, unless otherwise indicated, including gum, mints, and ice chips  > Responsible adult must drive patient to the hospital, stay during surgery, and patient will need supervision 24 hours after anesthesia  > Use non moisturizing soap in shower the night before surgery and on the morning of surgery  > All piercings must be removed prior to arrival.    > Leave all valuables (money and jewelry) at home but bring insurance card and ID on DOS.   > You may be required to pay a deductible or co-pay on the day of your procedure. You can pre-pay by calling [phone] if your surgery is at the [facility] or [phone] if your surgery is at the [facility].  > Do not wear make-up, nail polish, lotions, cologne, perfumes, powders, or oil on skin. Artificial nails are not permitted.   A

## 2024-06-26 ENCOUNTER — ANESTHESIA EVENT (OUTPATIENT)
Dept: SURGERY | Age: 84
End: 2024-06-26
Payer: MEDICARE

## 2024-06-26 ENCOUNTER — ANESTHESIA (OUTPATIENT)
Dept: SURGERY | Age: 84
End: 2024-06-26
Payer: MEDICARE

## 2024-06-26 ENCOUNTER — HOSPITAL ENCOUNTER (OUTPATIENT)
Age: 84
Setting detail: OUTPATIENT SURGERY
Discharge: HOME | End: 2024-06-26
Attending: SURGERY | Admitting: SURGERY
Payer: MEDICARE

## 2024-06-26 VITALS
HEART RATE: 76 BPM | RESPIRATION RATE: 14 BRPM | TEMPERATURE: 97.6 F | BODY MASS INDEX: 18.56 KG/M2 | SYSTOLIC BLOOD PRESSURE: 193 MMHG | DIASTOLIC BLOOD PRESSURE: 72 MMHG | WEIGHT: 98.3 LBS | HEIGHT: 61 IN | OXYGEN SATURATION: 92 %

## 2024-06-26 DIAGNOSIS — K80.20 CALCULUS OF GALLBLADDER WITHOUT CHOLECYSTITIS WITHOUT OBSTRUCTION: Primary | ICD-10-CM

## 2024-06-26 PROCEDURE — 7100000010 HC PHASE II RECOVERY - FIRST 15 MIN: Performed by: SURGERY

## 2024-06-26 PROCEDURE — 2580000003 HC RX 258

## 2024-06-26 PROCEDURE — 2580000003 HC RX 258: Performed by: ANESTHESIOLOGY

## 2024-06-26 PROCEDURE — C1894 INTRO/SHEATH, NON-LASER: HCPCS | Performed by: SURGERY

## 2024-06-26 PROCEDURE — 7100000000 HC PACU RECOVERY - FIRST 15 MIN: Performed by: SURGERY

## 2024-06-26 PROCEDURE — 47562 LAPAROSCOPIC CHOLECYSTECTOMY: CPT | Performed by: SURGERY

## 2024-06-26 PROCEDURE — 7100000011 HC PHASE II RECOVERY - ADDTL 15 MIN: Performed by: SURGERY

## 2024-06-26 PROCEDURE — 3700000000 HC ANESTHESIA ATTENDED CARE: Performed by: SURGERY

## 2024-06-26 PROCEDURE — 3600000014 HC SURGERY LEVEL 4 ADDTL 15MIN: Performed by: SURGERY

## 2024-06-26 PROCEDURE — 6360000002 HC RX W HCPCS

## 2024-06-26 PROCEDURE — 3600000004 HC SURGERY LEVEL 4 BASE: Performed by: SURGERY

## 2024-06-26 PROCEDURE — 6360000002 HC RX W HCPCS: Performed by: SURGERY

## 2024-06-26 PROCEDURE — 88304 TISSUE EXAM BY PATHOLOGIST: CPT

## 2024-06-26 PROCEDURE — 2709999900 HC NON-CHARGEABLE SUPPLY: Performed by: SURGERY

## 2024-06-26 PROCEDURE — 2500000003 HC RX 250 WO HCPCS: Performed by: ANESTHESIOLOGY

## 2024-06-26 PROCEDURE — 3700000001 HC ADD 15 MINUTES (ANESTHESIA): Performed by: SURGERY

## 2024-06-26 PROCEDURE — 2500000003 HC RX 250 WO HCPCS

## 2024-06-26 PROCEDURE — 7100000001 HC PACU RECOVERY - ADDTL 15 MIN: Performed by: SURGERY

## 2024-06-26 PROCEDURE — 2720000010 HC SURG SUPPLY STERILE: Performed by: SURGERY

## 2024-06-26 PROCEDURE — 6360000004 HC RX CONTRAST MEDICATION: Performed by: SURGERY

## 2024-06-26 RX ORDER — OXYCODONE HYDROCHLORIDE 5 MG/1
5 TABLET ORAL EVERY 6 HOURS PRN
Qty: 20 TABLET | Refills: 0 | Status: SHIPPED | OUTPATIENT
Start: 2024-06-26 | End: 2024-07-01

## 2024-06-26 RX ORDER — SODIUM CHLORIDE 9 MG/ML
INJECTION, SOLUTION INTRAVENOUS PRN
Status: DISCONTINUED | OUTPATIENT
Start: 2024-06-26 | End: 2024-06-26 | Stop reason: HOSPADM

## 2024-06-26 RX ORDER — SODIUM CHLORIDE 0.9 % (FLUSH) 0.9 %
5-40 SYRINGE (ML) INJECTION EVERY 12 HOURS SCHEDULED
Status: DISCONTINUED | OUTPATIENT
Start: 2024-06-26 | End: 2024-06-26 | Stop reason: HOSPADM

## 2024-06-26 RX ORDER — SODIUM CHLORIDE 0.9 % (FLUSH) 0.9 %
5-40 SYRINGE (ML) INJECTION PRN
Status: DISCONTINUED | OUTPATIENT
Start: 2024-06-26 | End: 2024-06-26 | Stop reason: HOSPADM

## 2024-06-26 RX ORDER — NALOXONE HYDROCHLORIDE 0.4 MG/ML
INJECTION, SOLUTION INTRAMUSCULAR; INTRAVENOUS; SUBCUTANEOUS PRN
Status: DISCONTINUED | OUTPATIENT
Start: 2024-06-26 | End: 2024-06-26 | Stop reason: HOSPADM

## 2024-06-26 RX ORDER — ONDANSETRON 2 MG/ML
INJECTION INTRAMUSCULAR; INTRAVENOUS PRN
Status: DISCONTINUED | OUTPATIENT
Start: 2024-06-26 | End: 2024-06-26 | Stop reason: SDUPTHER

## 2024-06-26 RX ORDER — FENTANYL CITRATE 50 UG/ML
INJECTION, SOLUTION INTRAMUSCULAR; INTRAVENOUS PRN
Status: DISCONTINUED | OUTPATIENT
Start: 2024-06-26 | End: 2024-06-26 | Stop reason: SDUPTHER

## 2024-06-26 RX ORDER — FENTANYL CITRATE 50 UG/ML
100 INJECTION, SOLUTION INTRAMUSCULAR; INTRAVENOUS
Status: DISCONTINUED | OUTPATIENT
Start: 2024-06-26 | End: 2024-06-26 | Stop reason: HOSPADM

## 2024-06-26 RX ORDER — PROPOFOL 10 MG/ML
INJECTION, EMULSION INTRAVENOUS PRN
Status: DISCONTINUED | OUTPATIENT
Start: 2024-06-26 | End: 2024-06-26 | Stop reason: SDUPTHER

## 2024-06-26 RX ORDER — MIDAZOLAM HYDROCHLORIDE 2 MG/2ML
2 INJECTION, SOLUTION INTRAMUSCULAR; INTRAVENOUS
Status: DISCONTINUED | OUTPATIENT
Start: 2024-06-26 | End: 2024-06-26 | Stop reason: HOSPADM

## 2024-06-26 RX ORDER — KETOROLAC TROMETHAMINE 30 MG/ML
INJECTION, SOLUTION INTRAMUSCULAR; INTRAVENOUS PRN
Status: DISCONTINUED | OUTPATIENT
Start: 2024-06-26 | End: 2024-06-26 | Stop reason: SDUPTHER

## 2024-06-26 RX ORDER — BUPIVACAINE HYDROCHLORIDE 5 MG/ML
INJECTION, SOLUTION EPIDURAL; INTRACAUDAL; PERINEURAL PRN
Status: DISCONTINUED | OUTPATIENT
Start: 2024-06-26 | End: 2024-06-26 | Stop reason: ALTCHOICE

## 2024-06-26 RX ORDER — LIDOCAINE HYDROCHLORIDE 20 MG/ML
INJECTION, SOLUTION EPIDURAL; INFILTRATION; INTRACAUDAL; PERINEURAL PRN
Status: DISCONTINUED | OUTPATIENT
Start: 2024-06-26 | End: 2024-06-26 | Stop reason: SDUPTHER

## 2024-06-26 RX ORDER — ROCURONIUM BROMIDE 10 MG/ML
INJECTION, SOLUTION INTRAVENOUS PRN
Status: DISCONTINUED | OUTPATIENT
Start: 2024-06-26 | End: 2024-06-26 | Stop reason: SDUPTHER

## 2024-06-26 RX ORDER — OXYCODONE HYDROCHLORIDE 5 MG/1
5 TABLET ORAL
Status: DISCONTINUED | OUTPATIENT
Start: 2024-06-26 | End: 2024-06-26 | Stop reason: HOSPADM

## 2024-06-26 RX ORDER — EPHEDRINE SULFATE 5 MG/ML
INJECTION INTRAVENOUS PRN
Status: DISCONTINUED | OUTPATIENT
Start: 2024-06-26 | End: 2024-06-26 | Stop reason: SDUPTHER

## 2024-06-26 RX ORDER — LIDOCAINE HYDROCHLORIDE 10 MG/ML
1 INJECTION, SOLUTION INFILTRATION; PERINEURAL
Status: COMPLETED | OUTPATIENT
Start: 2024-06-26 | End: 2024-06-26

## 2024-06-26 RX ORDER — SODIUM CHLORIDE, SODIUM LACTATE, POTASSIUM CHLORIDE, CALCIUM CHLORIDE 600; 310; 30; 20 MG/100ML; MG/100ML; MG/100ML; MG/100ML
INJECTION, SOLUTION INTRAVENOUS CONTINUOUS
Status: DISCONTINUED | OUTPATIENT
Start: 2024-06-26 | End: 2024-06-26 | Stop reason: HOSPADM

## 2024-06-26 RX ADMIN — SODIUM CHLORIDE, SODIUM LACTATE, POTASSIUM CHLORIDE, AND CALCIUM CHLORIDE: 600; 310; 30; 20 INJECTION, SOLUTION INTRAVENOUS at 08:35

## 2024-06-26 RX ADMIN — SUGAMMADEX 200 MG: 100 INJECTION, SOLUTION INTRAVENOUS at 08:30

## 2024-06-26 RX ADMIN — ONDANSETRON 4 MG: 2 INJECTION INTRAMUSCULAR; INTRAVENOUS at 08:23

## 2024-06-26 RX ADMIN — PHENYLEPHRINE HYDROCHLORIDE 100 MCG: 10 INJECTION INTRAVENOUS at 07:13

## 2024-06-26 RX ADMIN — ROCURONIUM BROMIDE 30 MG: 10 INJECTION, SOLUTION INTRAVENOUS at 07:07

## 2024-06-26 RX ADMIN — LIDOCAINE HYDROCHLORIDE 1 ML: 10 INJECTION, SOLUTION INFILTRATION; PERINEURAL at 06:18

## 2024-06-26 RX ADMIN — ROCURONIUM BROMIDE 10 MG: 10 INJECTION, SOLUTION INTRAVENOUS at 07:44

## 2024-06-26 RX ADMIN — PROPOFOL 100 MG: 10 INJECTION, EMULSION INTRAVENOUS at 07:06

## 2024-06-26 RX ADMIN — PHENYLEPHRINE HYDROCHLORIDE 50 MCG: 10 INJECTION INTRAVENOUS at 07:07

## 2024-06-26 RX ADMIN — FENTANYL CITRATE 50 MCG: 50 INJECTION, SOLUTION INTRAMUSCULAR; INTRAVENOUS at 07:02

## 2024-06-26 RX ADMIN — EPHEDRINE SULFATE 10 MG: 5 INJECTION INTRAVENOUS at 07:49

## 2024-06-26 RX ADMIN — EPHEDRINE SULFATE 5 MG: 5 INJECTION INTRAVENOUS at 07:13

## 2024-06-26 RX ADMIN — KETOROLAC TROMETHAMINE 15 MG: 30 INJECTION, SOLUTION INTRAMUSCULAR; INTRAVENOUS at 08:24

## 2024-06-26 RX ADMIN — Medication 2000 MG: at 07:42

## 2024-06-26 RX ADMIN — LIDOCAINE HYDROCHLORIDE 60 MG: 20 INJECTION, SOLUTION EPIDURAL; INFILTRATION; INTRACAUDAL; PERINEURAL at 07:06

## 2024-06-26 RX ADMIN — FENTANYL CITRATE 50 MCG: 50 INJECTION, SOLUTION INTRAMUSCULAR; INTRAVENOUS at 07:58

## 2024-06-26 RX ADMIN — SODIUM CHLORIDE, SODIUM LACTATE, POTASSIUM CHLORIDE, AND CALCIUM CHLORIDE: 600; 310; 30; 20 INJECTION, SOLUTION INTRAVENOUS at 06:17

## 2024-06-26 RX ADMIN — EPHEDRINE SULFATE 10 MG: 5 INJECTION INTRAVENOUS at 07:16

## 2024-06-26 RX ADMIN — PHENYLEPHRINE HYDROCHLORIDE 50 MCG: 10 INJECTION INTRAVENOUS at 07:16

## 2024-06-26 ASSESSMENT — PAIN - FUNCTIONAL ASSESSMENT: PAIN_FUNCTIONAL_ASSESSMENT: 0-10

## 2024-06-26 ASSESSMENT — PAIN SCALES - GENERAL
PAINLEVEL_OUTOF10: 0
PAINLEVEL_OUTOF10: 2
PAINLEVEL_OUTOF10: 0
PAINLEVEL_OUTOF10: 0

## 2024-06-26 ASSESSMENT — PAIN DESCRIPTION - DESCRIPTORS: DESCRIPTORS: ACHING

## 2024-06-26 NOTE — PERIOP NOTE
MD Cain at bedside with patient. Pt VS stable. Pain and nausea controlled at this time. Verbal signout per MD when PACU care is completed. Plan of care continues.

## 2024-06-26 NOTE — ANESTHESIA POSTPROCEDURE EVALUATION
Department of Anesthesiology  Postprocedure Note    Patient: Karina Aaron  MRN: [id number]  YOB: 1940  Date of evaluation: 6/26/2024    Procedure Summary       Date: 06/26/24 [place] [facility] [place] [facility] [place] [facility] [place]    Anesthesia Start: 0652 Anesthesia Stop: 0900    Procedure: CHOLECYSTECTOMY LAPAROSCOPIC cholangiogram (Abdomen) Diagnosis:       Gallstone      (Gallstone [K80.20])    Surgeons: Cristi Haney Jr., MD Responsible Provider: Chris Cain MD    Anesthesia Type: General ASA Status: 3            Anesthesia Type: General    Maria R Phase I: Maria R Score: 8    Maria R Phase II:      Anesthesia Post Evaluation    Patient location during evaluation: PACU  Patient participation: complete - patient participated  Level of consciousness: awake  Airway patency: patent  Nausea & Vomiting: no nausea  Cardiovascular status: hemodynamically stable and blood pressure returned to baseline  Respiratory status: acceptable and nonlabored ventilation  Hydration status: stable  Multimodal analgesia pain management approach    No notable events documented.

## 2024-06-26 NOTE — DISCHARGE INSTRUCTIONS
Activity: Please take it easy for a couple of days after surgery.  Walking is encouraged. The sooner you are up walking the faster your recovery. This will also help prevent constipation and pneumonia. You should avoid lifting, pushing, and/or pulling anything greater than 10 pounds for 6 weeks following surgery (a gallon of milk weighs approximately 8.6 lbs). You may resume work and full activity within 6-8 weeks. This will help your incision stay intact.     Driving- You may drive when you are no longer taking the narcotic pain medication, can quickly move your foot from gas pedal to the brake and turn the steering wheel with both arms. You must also be able to sit comfortably for a long period of time, even if you do not drive far, you may get caught in traffic!!    Diet: You may resume a regular diet. You may want to avoid trigger foods such as deep fried fatty foods and carbonated beverages for the first two weeks. The prescription for pain medication sometimes may cause nausea. If this happens, eat bland foods such as toast or crackers and sip ginger ale. If nausea is severe, please call our office.    You may have on a Scopolamine patch placed behind your ear for nausea prevention. The patch is effective for 72 hours after placement. This medication may cause dizziness or blurred vision. The patch may be removed prior to 72 hours if nausea is not present. You may peel it off, being sure to wash hands thoroughly after removal.    Pain: You may experience some pain in the surgical area. A prescription was written for narcotics at the time of your surgery. You may want to use this medication during the first few days of your recovery when you're most likely to have pain. While taking this prescription, also take 650 mg (2 regular strength) Tylenol every 6 hours to help with pain management. As your pain lessens use less and less of the narcotic pain medicine and begin the switch to Ibuprofen (Advil) and/or

## 2024-06-26 NOTE — ADDENDUM NOTE
Addendum  created 06/26/24 0941 by Delia Otero APRN - CRNA    Child order released for a procedure order, Clinical Note Signed, Intraprocedure Blocks edited, LDA created via procedure documentation, SmartForm saved

## 2024-06-26 NOTE — BRIEF OP NOTE
Brief Postoperative Note      Patient: Karina Aaron  YOB: 1940  MRN: [id number]    Date of Procedure: 6/26/2024    Pre-Op Diagnosis Codes:     * Gallstone [K80.20]    Post-Op Diagnosis: Same       Procedure(s):  CHOLECYSTECTOMY LAPAROSCOPIC cholangiogram    Surgeon(s):  Cristi Haney Jr., MD    Assistant:  * No surgical staff found *    Anesthesia: General    Estimated Blood Loss (mL): 10ml    Complications: None    Specimens:   ID Type Source Tests Collected by Time Destination   A : Gallbladder Tissue Gallbladder SURGICAL PATHOLOGY Cristi Haney Jr., MD 6/26/2024 0806        Implants:  * No implants in log *      Drains: * No LDAs found *    Findings:  Infection Present At Time Of Surgery (PATOS) (choose all levels that have infection present):  No infection present  Other Findings: Distended gallbladder    Electronically signed by Cristi Haney Jr, MD on 6/26/2024 at 8:36 AM

## 2024-06-26 NOTE — H&P
CHIEF COMPLAINT: biliary dyskinesia        HISTORY:  Has LIH. CT scan of the abdomen and pelvis showed gallstones. On further discussion she notes recent weight loss which she thinks comes about because of nausea that she experiences particularly after waking.  Gets nausea with eating.  She also notes some mid abdominal pain intermittently with eating. HIDA showed decreased gallbladder ejection fraction of 14% consistent with biliary dyskinesia.     REVIEW OF SYSTEMS:  Review of Systems   Constitutional: Negative.    HENT: Negative.     Eyes: Negative.    Respiratory: Negative.     Cardiovascular: Negative.    Gastrointestinal: Negative.    Endocrine: Negative.    Genitourinary: Negative.    Musculoskeletal: Negative.    Skin: Negative.    Allergic/Immunologic: Negative.    Neurological: Negative.    Hematological: Negative.    Psychiatric/Behavioral: Negative.              Past Medical History        Past Medical History:   Diagnosis Date    Arrhythmia       FAST HEART BEAT    Arthritis       OSTEO    Bell's palsy 12/7/2015    Bell's palsy      Cancer (HCC)       melona    Cataract 12/7/2015    Coronary artery disease 6/23/2009    GERD (gastroesophageal reflux disease)       ACID REFLUX    Hx of varicose vein ligation and stripping 5/16/2016     LEFT 30+ yrs ago    Hyperlipidemia 12/7/2015    Hypertension      Macular degeneration 12/7/2015    Nerve damage       from shingles    Osteoarthrosis involving multiple sites but not generalized 12/7/2015    Osteoporosis 12/7/2015    Varicose vein of leg              Current Facility-Administered Medications          Current Outpatient Medications   Medication Sig Dispense Refill    traMADol (ULTRAM) 50 MG tablet Take 1 tablet by mouth every 6 hours as needed for Pain for up to 7 days. Intended supply: 3 days. Take lowest dose possible to manage pain Max Daily Amount: 200 mg 10 tablet 0    rivaroxaban (XARELTO) 20 MG TABS tablet Take 1 tablet by mouth Daily with supper 21

## 2024-06-26 NOTE — ANESTHESIA PROCEDURE NOTES
Airway  Date/Time: 6/26/2024 7:08 AM  Urgency: elective    Airway not difficult    General Information and Staff    Patient location during procedure: OR  Performed: resident/CRNA   Performed by: Delia Otero APRN - CRNA  Authorized by: Chris Cain MD      Indications and Patient Condition  Indications for airway management: anesthesia  Spontaneous Ventilation: absent  Sedation level: deep  Preoxygenated: yes  Patient position: sniffing  Mask difficulty assessment: vent by bag mask + OA or adjuvant +/- NMBA    Final Airway Details  Final airway type: endotracheal airway      Successful airway: ETT  Cuffed: yes   Successful intubation technique: video laryngoscopy  Facilitating devices/methods: intubating stylet  Endotracheal tube insertion site: oral  Blade: Dhaval  Blade size: #3  ETT size (mm): 7.0  Cormack-Lehane Classification: grade I - full view of glottis  Placement verified by: chest auscultation and capnometry   Measured from: lips  ETT to lips (cm): 21  Number of attempts at approach: 1  Ventilation between attempts: bag mask  Number of other approaches attempted: 0    no

## 2024-06-26 NOTE — ANESTHESIA PRE PROCEDURE
ligation and stripping Z98.890   • Varicose vein of leg I83.90   • Cataract H26.9   • Essential hypertension I10   • Bell's palsy G51.0   • Facial droop R29.810   • Osteoporosis M81.0   • Interstitial cystitis N30.10   • LBBB (left bundle branch block) I44.7   • Hypokalemia E87.6   • Atrial tachycardia (HCC) I47.19   • Coronary artery disease I25.10   • Macular degeneration H35.30   • Gallstone K80.20       Past Medical History:        Diagnosis Date   • Anxiety and depression     per patient   • Arrhythmia     FAST HEART BEAT   • Arthritis     OSTEO   • Atrial fibrillation (HCC)     s/p ablation   • Atrial tachycardia (HCC)    • Bell's palsy 12/7/2015   • Bell's palsy    • Cancer (HCC)     melona   • Cataract 12/7/2015   • Coronary artery disease 6/23/2009    1 stent placed 2009   • GERD (gastroesophageal reflux disease)     ACID REFLUX   • History of echocardiogram 04/08/2021    Echo showed EF 60-65%, no regional wall motion abnormalities, LAE   mild to moderate mitral regurgitation, mild tricuspid regurgitation   • Hx of varicose vein ligation and stripping 5/16/2016    LEFT 30+ yrs ago   • Hyperlipidemia 12/7/2015   • Hypertension    • Increased urinary frequency    • Left bundle branch block     ekg 6/8/23   • Macular degeneration 12/7/2015    pt states is legally blind   • Nerve damage     from shingles   • Osteoarthrosis involving multiple sites but not generalized 12/7/2015   • Osteoporosis 12/7/2015   • SVT (supraventricular tachycardia) ([facility])     followed by cardio; takes Xarelto   • Varicose vein of leg        Past Surgical History:        Procedure Laterality Date   • APPENDECTOMY     • CARDIAC CATHETERIZATION      with 1 stent   • COLONOSCOPY     • HYSTERECTOMY (CERVIX STATUS UNKNOWN)      partial   • OTHER SURGICAL HISTORY      BREAST IMPLANTS, BLADDER TACT       Social History:    Social History     Tobacco Use   • Smoking status: Never   • Smokeless tobacco: Never   Substance Use Topics   • Alcohol

## 2024-06-26 NOTE — OP NOTE
Operative Note      Patient: Karina Aaron  YOB: 1940  MRN: [id number]    Date of Procedure: 6/26/2024    Pre-Op Diagnosis Codes:     * Gallstone [K80.20]    Post-Op Diagnosis: Same       Procedure(s):  CHOLECYSTECTOMY LAPAROSCOPIC cholangiogram    Surgeon(s):  Cristi Haney Jr., MD    Assistant:   * No surgical staff found *    Anesthesia: General    Estimated Blood Loss (mL): 10 mL    Complications: None    Specimens:   ID Type Source Tests Collected by Time Destination   A : Gallbladder Tissue Gallbladder SURGICAL PATHOLOGY Cristi Haney Jr., MD 6/26/2024 0806        Implants:  * No implants in log *      Drains: * No LDAs found *    Findings:  Infection Present At Time Of Surgery (PATOS) (choose all levels that have infection present):  No infection present  Other Findings: Distended gallbladder    Detailed Description of Procedure:   Informed consent was obtained and the patient was brought to the operating room and placed on the table in supine position with adequate padding of all pressure points and compression devices on both lower extremities. After the successful induction of general anesthesia, the patient’s abdomen was prepped and draped in a sterile fashion. The infraumbilical location was infiltrated with 0.5% marcaine and a small infraumbilical skin incision was made. The umbilical stalk was grasped and elevated. The midline fascia was identified and the verus needle was inserted and after confirming its intraabdominal location with the saline drop test, pneumoperitoneum was created. A 12mm trocar was then advanced into the abdomen. The 5mm trocars were placed in the right upper quadrant subcostal under direct visualization.  Her liver was enlarged but had normal appearing texture.  The gallbladder was distended.  The gallbladder was grasped by its tip and retracted up and over the liver and by its neck and retracted anterolaterally. The tissues investing the distal

## 2024-06-28 ENCOUNTER — TELEPHONE (OUTPATIENT)
Dept: SURGERY | Age: 84
End: 2024-06-28

## 2024-07-01 NOTE — PROGRESS NOTES
[facility]      Karina Aaron   presents for follow-up after Laparoscopic Cholecystectomy  by Dr. Haney.  She reports no problems with eating, bowel movements, voiding, or their wound and no ongoing postoperative problems.      EXAM:  She  is in no distress  There is mild residual tenderness in the abdomen   Incision: healing well, no seroma, no cellulitis    Pathology:    Microscopic Description        Mild chronic inflammation is seen in the wall of the gallbladder.  No   dysplasia is identified.     Specimen(s) Received   A: Gallbladder       ASSESSMENT/PLAN:    1. Calculus of gallbladder without cholecystitis without obstruction         Diet as tolerated  No heavy lifting for 4-6 weeks  Return sooner with worsening of symptoms.    Return if symptoms worsen or fail to improve.     IRMA Hankins

## 2024-07-02 ENCOUNTER — TELEPHONE (OUTPATIENT)
Dept: SURGERY | Age: 84
End: 2024-07-02

## 2024-07-02 ENCOUNTER — OFFICE VISIT (OUTPATIENT)
Dept: SURGERY | Age: 84
End: 2024-07-02

## 2024-07-02 DIAGNOSIS — K80.20 CALCULUS OF GALLBLADDER WITHOUT CHOLECYSTITIS WITHOUT OBSTRUCTION: Primary | ICD-10-CM

## 2024-07-02 PROCEDURE — 99024 POSTOP FOLLOW-UP VISIT: CPT

## 2024-07-02 NOTE — TELEPHONE ENCOUNTER
Called patient to see if they wanted to come in sooner for their appointment per Pinky. Patient didn't answer so I left a voicemail.

## 2024-07-02 NOTE — PATIENT INSTRUCTIONS
- Wait until 4-6 weeks post op prior to returning to full activity.  - Call with any questions or concerns.  - Return to clinic with worsening of symptoms.

## 2024-11-12 ENCOUNTER — OFFICE VISIT (OUTPATIENT)
Dept: SURGERY | Age: 84
End: 2024-11-12
Payer: MEDICARE

## 2024-11-12 ENCOUNTER — PREP FOR PROCEDURE (OUTPATIENT)
Dept: SURGERY | Age: 84
End: 2024-11-12

## 2024-11-12 VITALS
HEART RATE: 73 BPM | SYSTOLIC BLOOD PRESSURE: 168 MMHG | HEIGHT: 61 IN | BODY MASS INDEX: 18.01 KG/M2 | DIASTOLIC BLOOD PRESSURE: 90 MMHG | WEIGHT: 95.4 LBS

## 2024-11-12 DIAGNOSIS — K43.9 VENTRAL HERNIA WITHOUT OBSTRUCTION OR GANGRENE: Primary | ICD-10-CM

## 2024-11-12 DIAGNOSIS — K43.9 VENTRAL HERNIA WITHOUT OBSTRUCTION OR GANGRENE: ICD-10-CM

## 2024-11-12 PROCEDURE — 3080F DIAST BP >= 90 MM HG: CPT | Performed by: SURGERY

## 2024-11-12 PROCEDURE — 1123F ACP DISCUSS/DSCN MKR DOCD: CPT | Performed by: SURGERY

## 2024-11-12 PROCEDURE — 3077F SYST BP >= 140 MM HG: CPT | Performed by: SURGERY

## 2024-11-12 PROCEDURE — 1159F MED LIST DOCD IN RCRD: CPT | Performed by: SURGERY

## 2024-11-12 PROCEDURE — 99213 OFFICE O/P EST LOW 20 MIN: CPT | Performed by: SURGERY

## 2024-11-12 PROCEDURE — 1160F RVW MEDS BY RX/DR IN RCRD: CPT | Performed by: SURGERY

## 2024-11-12 RX ORDER — HYDROCODONE BITARTRATE AND ACETAMINOPHEN 5; 325 MG/1; MG/1
1 TABLET ORAL EVERY 8 HOURS PRN
COMMUNITY

## 2024-11-12 NOTE — PROGRESS NOTES
Date: 2024      Name: Karina Aaron      MRN: [id number]       : 1940       Age: 83 y.o.    Sex: female        Anne Marie Heredia MD       CC:    Chief Complaint   Patient presents with    Follow-up     NEW TO PROVIDER - New to provider: Left inguinal hernia per [facility]         HPI:     Karina Aaron is a 83 y.o. female who presents for evaluation of a ventral hernia as a self-referral. The patient reports LLQ/left inguinal pain \"all the time.\"  The patient had her gallbladder removed by Dr. Haney in 2024. A CT scan was done on 24 which showed:    INDICATION: Irreducible left inguinal hernia. Left inguinal pain.     TECHNIQUE: Multiple 2D axial images were obtained through the abdomen and  pelvis.  Oral contrast was used for bowel opacification.  100mL of Isovue 370  intravenous contrast was used for better evaluation of solid organs and vascular  structures.  Radiation dose reduction techniques were used for this study.  All  CT scans performed at this facility use one or all of the following: Automated  exposure control, adjustment of the mA and/or kVp according to patient's size,  iterative reconstruction.     COMPARISON: None     FINDINGS:  - LUNG BASES: Minimal tree-in-bud opacities at the left lung base, likely  chronic endobronchial infection.     - LIVER: Normal in size and appearance.    - GALLBLADDER/BILE DUCTS: Cholelithiasis with no findings of acute  cholecystitis. No biliary dilatation.  - PANCREAS: Benign capsular calcification along its lateral surface. Spleen is  otherwise normal.- SPLEEN: Normal.     - ADRENALS: Normal.  - KIDNEYS/URETERS: 4 mm nonobstructing stone in the lower right renal pole. 2 mm  nonobstructing stone in the upper left renal pole. Tiny 8 mm cyst of the upper  left renal pole.  - BLADDER: Normal.  - REPRODUCTIVE ORGANS: No pelvic masses. Hysterectomy.     - BOWEL: A few sigmoid diverticula without diverticulitis. No bowel obstruction.  No GI tract 
  Allergen Reactions    Celecoxib Rash    Penicillins Rash    Sulfa Antibiotics Rash       SH:    Social History     Tobacco Use    Smoking status: Never    Smokeless tobacco: Never   Vaping Use    Vaping status: Never Used   Substance Use Topics    Alcohol use: Not Currently     Alcohol/week: 1.0 standard drink of alcohol     Types: 1 Cans of beer per week     Comment: 1/2 beer once a month    Drug use: No       FH:    Family History   Problem Relation Age of Onset    Heart Attack Paternal Uncle     Heart Attack Father     Heart Attack Sister     Cancer Sister         one sister has ovarian cancer    Diabetes Mother     Other Mother         vv    Diabetes Maternal Grandmother     Other Maternal Grandmother         vv    Heart Disease Paternal Grandmother        ROS: The patient has no difficulty with chest pain or shortness of breath.  No fever or chills.  Comprehensive 13 point review of systems was otherwise unremarkable except as noted above.    Physical Exam:     BP (!) 168/90   Pulse 73   Ht 1.549 m (5' 1\")   Wt 43.3 kg (95 lb 6.4 oz)   BMI 18.03 kg/m²     General: Alert, oriented, cooperative *** in no acute distress.   Eyes: Sclera are clear. Conjunctiva and lids within normal limits. No icterus.  Ears and Nose: no gross deformities to visual inspection, gross hearing intact  Neck: Supple, trachea midline, no appreciable thyromegaly  Resp: Breathing is  non-labored. Lungs clear to auscultation without wheezing or rhonchi   CV: RRR. No murmurs, rubs or gallops appreciated.  Abd: soft non-tender and non-distended without peritoneal signs. +bs    Groin: ***  Psych:  Mood and affect appropriate.  Short-term memory and understanding intact      Assessment/Plan:  Karina Aaron is a 83 y.o. female who has signs and symptoms consistent with *** inguinal hernia.    1. Laparoscopic, possible open, *** inguinal hernia repair with mesh     I went over the risks of bleeding, infection, anesthesia, injury to the

## 2024-11-13 RX ORDER — PREGABALIN 75 MG/1
75 CAPSULE ORAL 2 TIMES DAILY
COMMUNITY

## 2024-11-13 NOTE — PERIOP NOTE
Patient verified name and .  Order for consent NOT found in EHR; patient verifies procedure.   Type 2 surgery, phone assessment complete. Orders NOT received.  Labs per surgeon: No lab orders received.   Labs per anesthesia protocol: HGB-to be collected dos.   EKG: Completed 24 and within anesthesia protocol. Patient with known LBBB and followed by Cardiology.     Echo (21), Cath (2/3/21), Stress test (18), and [facility] note (24) available in EHR for reference.     Patient answered medical/surgical history questions at their best of ability. All prior to admission medications documented in EPIC.    Patient instructed to continue taking all prescription medications up to the day of surgery but to take only the following medications the day of surgery according to anesthesia guidelines with a small sip of water: 81 mg Aspirin, Carvedilol, Norco if needed, Ativan if needed, Omeprazole, Lyrica. Also, patient is requested to take 2 Tylenol in the morning and then again before bed on the day before surgery. Regular or extra strength may be used.       Patient informed that all vitamins and supplements should be held 7 days prior to surgery and NSAIDS 5 days prior to surgery. Prescription meds to hold: Diclofenac Gel and Xarelto as instructed by surgeon and or cardiologist.     Patient instructed on the following:    > Arrive at A Entrance, time of arrival to be called the day before by 1700  > NPO after midnight, unless otherwise indicated, including gum, mints, and ice chips  >Please drink 32 ounces of non-caffeinated clear liquids 2 hours prior to your arrival to avoid dehydration.   > Responsible adult must drive patient to the hospital, stay during surgery, and patient will need supervision 24 hours after anesthesia  > Use non moisturizing soap in shower the night before surgery and on the morning of surgery  > All piercings must be removed prior to arrival.    > Leave all valuables

## 2024-11-14 ENCOUNTER — ANESTHESIA EVENT (OUTPATIENT)
Dept: SURGERY | Age: 84
End: 2024-11-14
Payer: MEDICARE

## 2024-11-14 NOTE — H&P
Date: 2024      Name: Karina Aaron      MRN: [id number]       : 1940       Age: 83 y.o.    Sex: female        Anne Marie Heredia MD       CC:    No chief complaint on file.      HPI:     Karina Aaron is a 83 y.o. female who presents for evaluation of a ventral hernia as a self-referral. The patient reports LLQ/left inguinal pain \"all the time.\"  The patient had her gallbladder removed by Dr. Haney in 2024. A CT scan was done on 24 which showed:    INDICATION: Irreducible left inguinal hernia. Left inguinal pain.     TECHNIQUE: Multiple 2D axial images were obtained through the abdomen and  pelvis.  Oral contrast was used for bowel opacification.  100mL of Isovue 370  intravenous contrast was used for better evaluation of solid organs and vascular  structures.  Radiation dose reduction techniques were used for this study.  All  CT scans performed at this facility use one or all of the following: Automated  exposure control, adjustment of the mA and/or kVp according to patient's size,  iterative reconstruction.     COMPARISON: None     FINDINGS:  - LUNG BASES: Minimal tree-in-bud opacities at the left lung base, likely  chronic endobronchial infection.     - LIVER: Normal in size and appearance.    - GALLBLADDER/BILE DUCTS: Cholelithiasis with no findings of acute  cholecystitis. No biliary dilatation.  - PANCREAS: Benign capsular calcification along its lateral surface. Spleen is  otherwise normal.- SPLEEN: Normal.     - ADRENALS: Normal.  - KIDNEYS/URETERS: 4 mm nonobstructing stone in the lower right renal pole. 2 mm  nonobstructing stone in the upper left renal pole. Tiny 8 mm cyst of the upper  left renal pole.  - BLADDER: Normal.  - REPRODUCTIVE ORGANS: No pelvic masses. Hysterectomy.     - BOWEL: A few sigmoid diverticula without diverticulitis. No bowel obstruction.  No GI tract tumor identified. Appendix not identified and may be surgically  absent.  - LYMPH NODES: No  LAPAROSCOPIC cholangiogram performed by Cristi Haney Jr., MD at [facility] MAIN OR    COLONOSCOPY      CORONARY STENT PLACEMENT  06/23/2009    1 stent    EP STUDY/ABLATION      A-Fib/ SVT-4/8/21    HYSTERECTOMY (CERVIX STATUS UNKNOWN)      partial       MEDS:    Prior to Visit Medications    Medication Sig Taking? Authorizing Provider   pregabalin (LYRICA) 75 MG capsule Take 1 capsule by mouth 2 times daily. Max Daily Amount: 150 mg Yes Simon Navarrete MD   HYDROcodone-acetaminophen (NORCO) 5-325 MG per tablet Take 1 tablet by mouth every 8 hours as needed for Pain. Yes Simon Navarrete MD   rivaroxaban (XARELTO) 20 MG TABS tablet Take 1 tablet by mouth Daily with supper Yes Foster Cee MD   losartan (COZAAR) 100 MG tablet Take 1 tablet by mouth daily Yes Geri Saleh, APRN - CNP   latanoprost (XALATAN) 0.005 % ophthalmic solution Place 1 drop into both eyes nightly Yes ProviderSimon MD   omeprazole (PRILOSEC) 40 MG delayed release capsule Take 1 capsule by mouth in the morning and at bedtime Yes ProviderSimon MD   aspirin 81 MG chewable tablet Take 1 tablet by mouth Daily with lunch Takes mid day Yes Automatic Reconciliation, Ar   carvedilol (COREG) 12.5 MG tablet Take 1 tablet by mouth 2 times daily (with meals) Yes Automatic Reconciliation, Ar   zolpidem (AMBIEN) 10 MG tablet Take 1 tablet by mouth nightly as needed for Sleep. Yes Automatic Reconciliation, Ar   diclofenac sodium (VOLTAREN) 1 % GEL Apply 2 g topically 4 times daily as needed for Pain  ProviderSimon MD   LORazepam (ATIVAN) 0.5 MG tablet Take 1 tablet by mouth every 6 hours as needed for Anxiety.  ProviderSimon MD   traMADol (ULTRAM) 50 MG tablet Take 1 tablet by mouth every 8 hours as needed.  ProviderSimon MD   ezetimibe (ZETIA) 10 MG tablet Take 1 tablet by mouth daily  Patient not taking: Reported on 11/13/2024  Automatic Reconciliation, Ar   fluticasone (FLONASE) 50 MCG/ACT nasal

## 2024-11-15 ENCOUNTER — ANESTHESIA (OUTPATIENT)
Dept: SURGERY | Age: 84
End: 2024-11-15
Payer: MEDICARE

## 2024-11-15 ENCOUNTER — HOSPITAL ENCOUNTER (OUTPATIENT)
Age: 84
Setting detail: OUTPATIENT SURGERY
Discharge: HOME | End: 2024-11-15
Attending: SURGERY | Admitting: SURGERY
Payer: MEDICARE

## 2024-11-15 VITALS
HEART RATE: 73 BPM | BODY MASS INDEX: 18.16 KG/M2 | OXYGEN SATURATION: 90 % | TEMPERATURE: 98 F | DIASTOLIC BLOOD PRESSURE: 71 MMHG | HEIGHT: 61 IN | WEIGHT: 96.2 LBS | SYSTOLIC BLOOD PRESSURE: 181 MMHG | RESPIRATION RATE: 16 BRPM

## 2024-11-15 DIAGNOSIS — K43.9 VENTRAL HERNIA WITHOUT OBSTRUCTION OR GANGRENE: Primary | ICD-10-CM

## 2024-11-15 PROCEDURE — 2500000003 HC RX 250 WO HCPCS

## 2024-11-15 PROCEDURE — 49593 RPR AA HRN 1ST 3-10 RDC: CPT | Performed by: SURGERY

## 2024-11-15 PROCEDURE — 6360000002 HC RX W HCPCS: Performed by: SURGERY

## 2024-11-15 PROCEDURE — 3600000004 HC SURGERY LEVEL 4 BASE: Performed by: SURGERY

## 2024-11-15 PROCEDURE — 6370000000 HC RX 637 (ALT 250 FOR IP): Performed by: ANESTHESIOLOGY

## 2024-11-15 PROCEDURE — 3700000000 HC ANESTHESIA ATTENDED CARE: Performed by: SURGERY

## 2024-11-15 PROCEDURE — 7100000001 HC PACU RECOVERY - ADDTL 15 MIN: Performed by: SURGERY

## 2024-11-15 PROCEDURE — 2580000003 HC RX 258: Performed by: SURGERY

## 2024-11-15 PROCEDURE — 3700000001 HC ADD 15 MINUTES (ANESTHESIA): Performed by: SURGERY

## 2024-11-15 PROCEDURE — 7100000011 HC PHASE II RECOVERY - ADDTL 15 MIN: Performed by: SURGERY

## 2024-11-15 PROCEDURE — 2720000010 HC SURG SUPPLY STERILE: Performed by: SURGERY

## 2024-11-15 PROCEDURE — 3600000014 HC SURGERY LEVEL 4 ADDTL 15MIN: Performed by: SURGERY

## 2024-11-15 PROCEDURE — 7100000010 HC PHASE II RECOVERY - FIRST 15 MIN: Performed by: SURGERY

## 2024-11-15 PROCEDURE — 6360000002 HC RX W HCPCS: Performed by: ANESTHESIOLOGY

## 2024-11-15 PROCEDURE — 7100000000 HC PACU RECOVERY - FIRST 15 MIN: Performed by: SURGERY

## 2024-11-15 PROCEDURE — 6360000002 HC RX W HCPCS

## 2024-11-15 PROCEDURE — 2580000003 HC RX 258: Performed by: ANESTHESIOLOGY

## 2024-11-15 PROCEDURE — 2709999900 HC NON-CHARGEABLE SUPPLY: Performed by: SURGERY

## 2024-11-15 PROCEDURE — C1781 MESH (IMPLANTABLE): HCPCS | Performed by: SURGERY

## 2024-11-15 DEVICE — MESH HERN 4.5IN VENTRAL POLYPR EPTFE REP LTWT CIR LO PROF L: Type: IMPLANTABLE DEVICE | Site: ABDOMEN | Status: FUNCTIONAL

## 2024-11-15 RX ORDER — ONDANSETRON 2 MG/ML
4 INJECTION INTRAMUSCULAR; INTRAVENOUS
Status: DISCONTINUED | OUTPATIENT
Start: 2024-11-15 | End: 2024-11-15 | Stop reason: HOSPADM

## 2024-11-15 RX ORDER — FENTANYL CITRATE 50 UG/ML
100 INJECTION, SOLUTION INTRAMUSCULAR; INTRAVENOUS
Status: DISCONTINUED | OUTPATIENT
Start: 2024-11-15 | End: 2024-11-15 | Stop reason: HOSPADM

## 2024-11-15 RX ORDER — ONDANSETRON 2 MG/ML
INJECTION INTRAMUSCULAR; INTRAVENOUS
Status: DISCONTINUED | OUTPATIENT
Start: 2024-11-15 | End: 2024-11-15 | Stop reason: SDUPTHER

## 2024-11-15 RX ORDER — KETAMINE HCL IN NACL, ISO-OSM 20 MG/2 ML
SYRINGE (ML) INJECTION
Status: DISCONTINUED | OUTPATIENT
Start: 2024-11-15 | End: 2024-11-15 | Stop reason: SDUPTHER

## 2024-11-15 RX ORDER — BUPIVACAINE HYDROCHLORIDE 5 MG/ML
INJECTION, SOLUTION EPIDURAL; INTRACAUDAL PRN
Status: DISCONTINUED | OUTPATIENT
Start: 2024-11-15 | End: 2024-11-15 | Stop reason: ALTCHOICE

## 2024-11-15 RX ORDER — OXYCODONE AND ACETAMINOPHEN 5; 325 MG/1; MG/1
1 TABLET ORAL EVERY 6 HOURS PRN
Qty: 28 TABLET | Refills: 0 | Status: SHIPPED | OUTPATIENT
Start: 2024-11-15 | End: 2024-11-22

## 2024-11-15 RX ORDER — SODIUM CHLORIDE 0.9 % (FLUSH) 0.9 %
5-40 SYRINGE (ML) INJECTION EVERY 12 HOURS SCHEDULED
Status: DISCONTINUED | OUTPATIENT
Start: 2024-11-15 | End: 2024-11-15 | Stop reason: HOSPADM

## 2024-11-15 RX ORDER — KETOROLAC TROMETHAMINE 30 MG/ML
15 INJECTION, SOLUTION INTRAMUSCULAR; INTRAVENOUS ONCE
Status: COMPLETED | OUTPATIENT
Start: 2024-11-15 | End: 2024-11-15

## 2024-11-15 RX ORDER — EPHEDRINE SULFATE 5 MG/ML
INJECTION INTRAVENOUS
Status: DISCONTINUED | OUTPATIENT
Start: 2024-11-15 | End: 2024-11-15 | Stop reason: SDUPTHER

## 2024-11-15 RX ORDER — SODIUM CHLORIDE, SODIUM LACTATE, POTASSIUM CHLORIDE, CALCIUM CHLORIDE 600; 310; 30; 20 MG/100ML; MG/100ML; MG/100ML; MG/100ML
INJECTION, SOLUTION INTRAVENOUS CONTINUOUS
Status: DISCONTINUED | OUTPATIENT
Start: 2024-11-15 | End: 2024-11-15 | Stop reason: HOSPADM

## 2024-11-15 RX ORDER — SODIUM CHLORIDE 9 MG/ML
INJECTION, SOLUTION INTRAVENOUS PRN
Status: DISCONTINUED | OUTPATIENT
Start: 2024-11-15 | End: 2024-11-15 | Stop reason: HOSPADM

## 2024-11-15 RX ORDER — DIPHENHYDRAMINE HYDROCHLORIDE 50 MG/ML
12.5 INJECTION INTRAMUSCULAR; INTRAVENOUS
Status: DISCONTINUED | OUTPATIENT
Start: 2024-11-15 | End: 2024-11-15 | Stop reason: HOSPADM

## 2024-11-15 RX ORDER — DEXAMETHASONE SODIUM PHOSPHATE 10 MG/ML
INJECTION INTRAMUSCULAR; INTRAVENOUS
Status: DISCONTINUED | OUTPATIENT
Start: 2024-11-15 | End: 2024-11-15 | Stop reason: SDUPTHER

## 2024-11-15 RX ORDER — ROCURONIUM BROMIDE 10 MG/ML
INJECTION, SOLUTION INTRAVENOUS
Status: DISCONTINUED | OUTPATIENT
Start: 2024-11-15 | End: 2024-11-15 | Stop reason: SDUPTHER

## 2024-11-15 RX ORDER — ACETAMINOPHEN 500 MG
1000 TABLET ORAL ONCE
Status: COMPLETED | OUTPATIENT
Start: 2024-11-15 | End: 2024-11-15

## 2024-11-15 RX ORDER — MIDAZOLAM HYDROCHLORIDE 2 MG/2ML
2 INJECTION, SOLUTION INTRAMUSCULAR; INTRAVENOUS
Status: DISCONTINUED | OUTPATIENT
Start: 2024-11-15 | End: 2024-11-15 | Stop reason: HOSPADM

## 2024-11-15 RX ORDER — PROPOFOL 10 MG/ML
INJECTION, EMULSION INTRAVENOUS
Status: DISCONTINUED | OUTPATIENT
Start: 2024-11-15 | End: 2024-11-15 | Stop reason: SDUPTHER

## 2024-11-15 RX ORDER — LIDOCAINE HYDROCHLORIDE 20 MG/ML
INJECTION, SOLUTION EPIDURAL; INFILTRATION; INTRACAUDAL; PERINEURAL
Status: DISCONTINUED | OUTPATIENT
Start: 2024-11-15 | End: 2024-11-15 | Stop reason: SDUPTHER

## 2024-11-15 RX ORDER — NALOXONE HYDROCHLORIDE 0.4 MG/ML
INJECTION, SOLUTION INTRAMUSCULAR; INTRAVENOUS; SUBCUTANEOUS PRN
Status: DISCONTINUED | OUTPATIENT
Start: 2024-11-15 | End: 2024-11-15 | Stop reason: HOSPADM

## 2024-11-15 RX ORDER — SODIUM CHLORIDE 0.9 % (FLUSH) 0.9 %
5-40 SYRINGE (ML) INJECTION PRN
Status: DISCONTINUED | OUTPATIENT
Start: 2024-11-15 | End: 2024-11-15 | Stop reason: HOSPADM

## 2024-11-15 RX ORDER — OXYCODONE HYDROCHLORIDE 5 MG/1
5 TABLET ORAL
Status: COMPLETED | OUTPATIENT
Start: 2024-11-15 | End: 2024-11-15

## 2024-11-15 RX ORDER — ONDANSETRON 4 MG/1
4 TABLET, ORALLY DISINTEGRATING ORAL 3 TIMES DAILY PRN
Qty: 21 TABLET | Refills: 3 | Status: SHIPPED | OUTPATIENT
Start: 2024-11-15

## 2024-11-15 RX ADMIN — CEFAZOLIN 2000 MG: 2 INJECTION, POWDER, FOR SOLUTION INTRAMUSCULAR; INTRAVENOUS at 11:29

## 2024-11-15 RX ADMIN — ONDANSETRON 4 MG: 2 INJECTION INTRAMUSCULAR; INTRAVENOUS at 12:11

## 2024-11-15 RX ADMIN — Medication 20 MG: at 11:16

## 2024-11-15 RX ADMIN — ROCURONIUM BROMIDE 30 MG: 10 INJECTION, SOLUTION INTRAVENOUS at 11:17

## 2024-11-15 RX ADMIN — Medication 10 MG: at 11:49

## 2024-11-15 RX ADMIN — SODIUM CHLORIDE, SODIUM LACTATE, POTASSIUM CHLORIDE, AND CALCIUM CHLORIDE: 600; 310; 30; 20 INJECTION, SOLUTION INTRAVENOUS at 11:08

## 2024-11-15 RX ADMIN — PROPOFOL 100 MG: 10 INJECTION, EMULSION INTRAVENOUS at 11:16

## 2024-11-15 RX ADMIN — ACETAMINOPHEN 1000 MG: 500 TABLET, FILM COATED ORAL at 10:11

## 2024-11-15 RX ADMIN — DEXAMETHASONE SODIUM PHOSPHATE 4 MG: 10 INJECTION INTRAMUSCULAR; INTRAVENOUS at 11:44

## 2024-11-15 RX ADMIN — SUGAMMADEX 200 MG: 100 INJECTION, SOLUTION INTRAVENOUS at 12:04

## 2024-11-15 RX ADMIN — HYDROMORPHONE HYDROCHLORIDE 0.25 MG: 1 INJECTION, SOLUTION INTRAMUSCULAR; INTRAVENOUS; SUBCUTANEOUS at 13:08

## 2024-11-15 RX ADMIN — EPHEDRINE SULFATE 10 MG: 5 INJECTION INTRAVENOUS at 11:30

## 2024-11-15 RX ADMIN — PHENYLEPHRINE HYDROCHLORIDE 100 MCG: 0.1 INJECTION, SOLUTION INTRAVENOUS at 11:28

## 2024-11-15 RX ADMIN — HYDROMORPHONE HYDROCHLORIDE 0.25 MG: 1 INJECTION, SOLUTION INTRAMUSCULAR; INTRAVENOUS; SUBCUTANEOUS at 12:55

## 2024-11-15 RX ADMIN — LIDOCAINE HYDROCHLORIDE 50 MG: 20 INJECTION, SOLUTION EPIDURAL; INFILTRATION; INTRACAUDAL; PERINEURAL at 11:16

## 2024-11-15 RX ADMIN — KETOROLAC TROMETHAMINE 15 MG: 30 INJECTION, SOLUTION INTRAMUSCULAR at 13:00

## 2024-11-15 RX ADMIN — OXYCODONE 5 MG: 5 TABLET ORAL at 13:25

## 2024-11-15 ASSESSMENT — PAIN SCALES - GENERAL
PAINLEVEL_OUTOF10: 6
PAINLEVEL_OUTOF10: 5
PAINLEVEL_OUTOF10: 7
PAINLEVEL_OUTOF10: 6
PAINLEVEL_OUTOF10: 5
PAINLEVEL_OUTOF10: 5

## 2024-11-15 ASSESSMENT — PAIN DESCRIPTION - DESCRIPTORS: DESCRIPTORS: ACHING

## 2024-11-15 ASSESSMENT — PAIN - FUNCTIONAL ASSESSMENT: PAIN_FUNCTIONAL_ASSESSMENT: 0-10

## 2024-11-15 ASSESSMENT — PAIN DESCRIPTION - ONSET: ONSET: SUDDEN

## 2024-11-15 ASSESSMENT — PAIN DESCRIPTION - PAIN TYPE: TYPE: SURGICAL PAIN

## 2024-11-15 ASSESSMENT — PAIN DESCRIPTION - LOCATION: LOCATION: ABDOMEN

## 2024-11-15 ASSESSMENT — PAIN DESCRIPTION - ORIENTATION: ORIENTATION: LEFT

## 2024-11-15 NOTE — PERIOP NOTE
MD Harmon notified of pt's high BP of 188/71. Pt's BP was high in pre op and MD ok with BP at this time. Pt other VS stable. Pain and nausea controlled at this time. Verbal signout per MD when PACU care is completed. Plan of care continues.

## 2024-11-15 NOTE — ANESTHESIA PROCEDURE NOTES
Airway  Date/Time: 11/15/2024 11:18 AM  Urgency: elective    Airway not difficult    General Information and Staff    Patient location during procedure: OR  Performed: resident/CRNA   Performed by: Delia Otero APRN - CRNA  Authorized by: Cristi Harmon MD      Indications and Patient Condition  Indications for airway management: anesthesia  Spontaneous Ventilation: absent  Sedation level: deep  Preoxygenated: yes  Patient position: sniffing  MILS not maintained throughout  Mask difficulty assessment: vent by bag mask + OA or adjuvant +/- NMBA    Final Airway Details  Final airway type: endotracheal airway      Successful airway: ETT  Cuffed: yes   Successful intubation technique: video laryngoscopy  Facilitating devices/methods: intubating stylet  Endotracheal tube insertion site: oral  Blade: Dhaval  Blade size: #3  ETT size (mm): 6.5  Cormack-Lehane Classification: grade I - full view of glottis  Placement verified by: chest auscultation and capnometry   Measured from: lips  ETT to lips (cm): 22  Number of attempts at approach: 1  Ventilation between attempts: bag mask  Number of other approaches attempted: 0    no

## 2024-11-15 NOTE — BRIEF OP NOTE
Brief Postoperative Note      Patient: Karina Aaron  YOB: 1940  MRN: [id number]    Date of Procedure: 11/15/2024    Pre-Op Diagnosis Codes:      * Ventral hernia without obstruction or gangrene [K43.9]/ LLQ pain    Post-Op Diagnosis: LLQ ventral hernia and intraabdominal adhesions       Procedure: Diagnostic laparoscopy, laparoscopic lysis of adhesions and laparoscopic ventral hernia repair (IPOM). Hernia diameter of 4 cm.    Surgeon(s):  Chelsey Vivas MD    Assistant:  First Assistant: Yaneli Chen    Anesthesia: General plus local    Estimated Blood Loss (mL): Minimal    Complications: None    Specimens:   * No specimens in log *    Implants:  Implant Name Type Inv. Item Serial No.  Lot No. LRB No. Used Action   MESH LAILA 4.5IN VENTRAL POLYPR EPTFE REP LTWT CIR LO PROF L - [id number]  MESH LAILA 4.5IN VENTRAL POLYPR EPTFE REP LTWT CIR LO PROF L  BARD DAVOL-WD [id number] N/A 1 Implanted         Drains:   Urinary Catheter 11/15/24 Vicente (Active)       Findings:  Infection Present At Time Of Surgery (PATOS) (choose all levels that have infection present):  No infection present  Other Findings: See dictated note.    Electronically signed by CHELSEY VIVAS MD on 11/15/2024 at 12:28 PM

## 2024-11-15 NOTE — OP NOTE
[facility] [address]  [address]                            OPERATIVE REPORT      PATIENT NAME: ABRAHAN MARTINEZ              : 1940  MED REC NO: [id number]                       ROOM: [facility] NO: [id number]                       ADMIT DATE: 11/15/2024  PROVIDER: Santy Magana MD    DATE OF SERVICE:  11/15/2024    PREOPERATIVE DIAGNOSES:  Left lower quadrant ventral hernia as well as left lower quadrant pain.    POSTOPERATIVE DIAGNOSES:  Left lower quadrant ventral hernia as well as left lower quadrant pain with intraabdominal adhesions found.    PROCEDURES PERFORMED:  Diagnostic laparoscopy, laparoscopic lysis of adhesions and laparoscopic ventral hernia repair.  The diameter of the hernia was 4 cm.  There was an intraperitoneal Onlay mesh IPOM.    SURGEON:  Santy Magana MD    ASSISTANT:  None.    ANESTHESIA:  General endotracheal anesthesia plus 30 mL of 0.5% Marcaine used for local anesthesia.    ESTIMATED BLOOD LOSS:  Less than 5 mL.    SPECIMENS REMOVED:  None.    INTRAOPERATIVE FINDINGS:  Left lower quadrant ventral hernia as well as intraabdominal adhesions.     COMPLICATIONS:  None.    IMPLANTS:  None.    INDICATIONS:  This is an 83-year-old female, who is the mother of one of the nurse anesthetist who works at the [employer].  Dr. Haney had removed her gallbladder in 2024.  She has continued to have a left lower quadrant pain and was suspected to have a hernia.  I saw her in the office and she was standing up.  She had a protrusion in the left lower quadrant consistent with a ventral hernia.  She also has had a bladder suspension procedure and I thought she would likely have some intraabdominal adhesions as well.  I recommended a diagnostic laparoscopy, lap ventral hernia repair and lysis of adhesions.  This was scheduled for today after going through the risks and benefits with the patient and her

## 2024-11-15 NOTE — PERIOP NOTE
Pt reporting 7/10 pain in PACU. MD Harmon at bedside, verbal order to give the dilaudid in 0.25mg increments. Also verbal order 15mg IV toradol once.

## 2024-11-15 NOTE — DISCHARGE INSTRUCTIONS
Ventral/Incisional Hernia Post Op Discharge Care Sheet    Activity: Please take it easy for a couple of days after surgery.  Walking is encouraged. The sooner you are up walking the faster your recovery. This will also help prevent constipation and pneumonia. You should avoid lifting, pushing, and/or pulling anything greater than 10 pounds for 6 weeks following surgery (a gallon of milk weighs approximately 8.6 lbs). You may resume work and full activity within 6-8 weeks. This will help your incision stay intact.     Driving- You may drive when you are no longer taking the narcotic pain medication, can quickly move your foot from gas pedal to the brake and turn the steering wheel with both arms. You must also be able to sit comfortably for a long period of time, even if you do not drive far, you may get caught in traffic!!    Diet: You may resume a regular diet. The prescription for pain medication sometimes may cause nausea. If this happens, eat bland foods such as toast or crackers and sip ginger ale. If nausea is severe, please call our office.    You may have on a Scopolamine patch placed behind your ear for nausea prevention. The patch is effective for 72 hours after placement. This medication may cause dizziness or blurred vision. The patch may be removed prior to 72 hours if nausea is not present. You may peel it off, being sure to wash hands thoroughly after removal.    Pain: You may experience some pain in the surgical area. A prescription was written for narcotics at the time of your surgery. You may want to use this medication during the first few days of your recovery when you’re most likely to have pain. As your pain lessens use less and less of the narcotic pain medicine and begin the switch to Ibuprofen (Advil) and/or Acetaminophen (Tylenol) as it can cause constipation and nausea.  Cepacol lozenges are available over the counter which can help soothe any throat irritation. A pillow to hug is helpful

## 2024-11-15 NOTE — ANESTHESIA POSTPROCEDURE EVALUATION
Department of Anesthesiology  Postprocedure Note    Patient: Karina Aaron  MRN: [id number]  YOB: 1940  Date of evaluation: 11/15/2024    Procedure Summary       Date: 11/15/24 Room / Location[place] [facility] [place] [address] [place] [address] MAIN OR    Anesthesia Start: 1108 Anesthesia Stop: 1230    Procedure: HERNIA VENTRAL REPAIR LAPAROSCOPIC POSSIBLE OPEN (Abdomen) Diagnosis:       Ventral hernia without obstruction or gangrene      (Ventral hernia without obstruction or gangrene [K43.9])    Surgeons: Santy Magana MD Responsible Provider: Cristi Harmon MD    Anesthesia Type: general ASA Status: 3            Anesthesia Type: No value filed.    Maria R Phase I: Maria R Score: 10    Maria R Phase II: Maria R Score: 10    Anesthesia Post Evaluation    Patient location during evaluation: PACU  Patient participation: complete - patient participated  Level of consciousness: awake and alert  Airway patency: patent  Nausea & Vomiting: no nausea and no vomiting  Cardiovascular status: hemodynamically stable  Respiratory status: acceptable, nonlabored ventilation and spontaneous ventilation  Hydration status: euvolemic  Comments: BP (!) 181/71   Pulse 73   Temp 98 °F (36.7 °C) (Temporal)   Resp 16   Ht 1.549 m (5' 1\")   Wt 43.6 kg (96 lb 3.2 oz)   SpO2 90%   BMI 18.18 kg/m²     Multimodal analgesia pain management approach  Pain management: adequate and satisfactory to patient    No notable events documented.

## 2024-11-15 NOTE — ANESTHESIA PRE PROCEDURE
Department of Anesthesiology  Preprocedure Note       Name:  Karina Aaron   Age:  83 y.o.  :  1940                                          MRN:  [id number]         Date:  11/15/2024      Surgeon: Surgeon(s):  Santy Magana MD    Procedure: Procedure(s):  HERNIA VENTRAL REPAIR LAPAROSCOPIC POSSIBLE OPEN    Medications prior to admission:   Prior to Admission medications    Medication Sig Start Date End Date Taking? Authorizing Provider   pregabalin (LYRICA) 75 MG capsule Take 1 capsule by mouth 2 times daily.   Yes Simon Navarrete MD   HYDROcodone-acetaminophen (NORCO) 5-325 MG per tablet Take 1 tablet by mouth every 8 hours as needed for Pain.   Yes Simon Navarrete MD   rivaroxaban (XARELTO) 20 MG TABS tablet Take 1 tablet by mouth Daily with supper 24  Yes Foster Cee MD   losartan (COZAAR) 100 MG tablet Take 1 tablet by mouth daily 24  Yes Geri Saleh, APRN - CNP   latanoprost (XALATAN) 0.005 % ophthalmic solution Place 1 drop into both eyes nightly 23  Yes Simon Navarrete MD   omeprazole (PRILOSEC) 40 MG delayed release capsule Take 1 capsule by mouth in the morning and at bedtime 23  Yes Simon Navarrete MD   aspirin 81 MG chewable tablet Take 1 tablet by mouth Daily with lunch Takes mid day 21  Yes Automatic Reconciliation, Ar   carvedilol (COREG) 12.5 MG tablet Take 1 tablet by mouth 2 times daily (with meals) 3/3/22  Yes Automatic Reconciliation, Ar   zolpidem (AMBIEN) 10 MG tablet Take 1 tablet by mouth nightly as needed for Sleep.   Yes Automatic Reconciliation, Ar   diclofenac sodium (VOLTAREN) 1 % GEL Apply 2 g topically 4 times daily as needed for Pain    Simon Navarrete MD   LORazepam (ATIVAN) 0.5 MG tablet Take 1 tablet by mouth every 6 hours as needed for Anxiety. 3/27/23   Simon Navarrete MD   traMADol (ULTRAM) 50 MG tablet Take 1 tablet by mouth every 8 hours as needed. 23   Simon Navarrete MD

## 2024-11-18 ENCOUNTER — TELEPHONE (OUTPATIENT)
Dept: SURGERY | Age: 84
End: 2024-11-18

## 2024-11-18 NOTE — PERIOP NOTE
Nurse to leave message with Jes SOLIS regarding patient pain and problems with getting percocet from pharmacy.

## 2024-11-18 NOTE — PERIOP NOTE
Nurse to write note on 11/18/24  Nurse to follow post-op phone call script.    Patient to report slight problems this weekend.  Patient to report no difficulty with urinating- that urination is getting better.  Patient to report pain is not tolerable and not under control at this time. Patient reports that she is prescribed norco through another doctor and has been taking norco during her recovery. Patient reports that pharmacy wont let her have percocet prescribed by [place] because she has Norco at home already. Patient reports pain is so bad that she is unable to stand and needs something stronger. Patient reports that pharmacy is supposed to speak with MD murphy today to let him know. Nurse in pacu to call [facility] after hanging up with patient.   Patient reports no N/V.  Patient reports having bleeding concerns post op day one with slight drainage and bleeding. Patient reports holding pressure and bleeding to stop and no problems since. Patient educated on s/s to look for if bleeding from incision site and to go to ER if concerned.  Nurse to educate patient on s/s to look for regarding infection during recovery -fever, redness, change in color, change in drainage.   Patient reports being satisfied with the care they received during their dafne-op stay.  Patient reports no further questions at this time. Nurse to educate that they can always call surgeons office with questions and concerns at any time before follow up appt.

## 2024-11-18 NOTE — TELEPHONE ENCOUNTER
Nurse called from [facility], stating she was doing post op calls and the patient stated that she was in so much pain that she could barely stand up. Nurse stated that the patient told her she was currently taking Norco for the pain that another prescriber gave her, but she needed something stronger. The nurse stated that the patient said she tried to  the Percocet from the pharmacy but they wouldn't fill the medication.    I then called the pharmacy to see why they would not fill the medication and they stated that they were making sure that we were aware that she currently was taking McGehee from another provider. Patient is also taking Lyrica, Ativan and Ambien. Patient had the McGehee refilled on 11/11/24 and got a quantity of 90 tablets to take 3 times a day for 30 days. The pharmacy want to know if it is still ok to fill the Percocet, since she is taking all of these medications.

## 2024-11-18 NOTE — TELEPHONE ENCOUNTER
Patient called in stating that the pharmacy would not fill her prescription for Percocet from [facility] because she is already taking Norco 3 times daily. Patient states that she needs the Percocet because the Norco is \"not helping\" with her surgical pain. This CMA spoke with Dr. Magana and he asked for me to call the pharmacy and cancel the Percocet prescription. Called [facility] pharmacy and spoke with Davina and canceled the prescription. Patient was advised that she could try taking Tylenol or Ibuprofen in conjunction with her Norco to see if that would help with her pain. Patient states that she has already tried that. Asked patient if she had had a bowel movement since surgery, states that she has not. We discussed her trying to take something for constipation. Patient states that she already takes colace. Discussed with her that she may need to try something such as MOM or dulcolax tablets. Patient states that she is unhappy with our decision to cancel the Percocet.

## 2025-01-02 DIAGNOSIS — I47.19 ATRIAL TACHYCARDIA (HCC): ICD-10-CM

## 2025-01-03 RX ORDER — LOSARTAN POTASSIUM 100 MG/1
100 TABLET ORAL DAILY
Qty: 90 TABLET | Refills: 3 | Status: SHIPPED | OUTPATIENT
Start: 2025-01-03

## 2025-01-03 NOTE — TELEPHONE ENCOUNTER
Requested Prescriptions     Pending Prescriptions Disp Refills    losartan (COZAAR) 100 MG tablet [Pharmacy Med Name: Losartan Potassium 100 MG Oral Tablet] 90 tablet 0     Sig: Take 1 tablet by mouth once daily        Verified rx. Last OV 6/20/24. Erx to pharm on file.

## 2025-02-21 ENCOUNTER — TELEPHONE (OUTPATIENT)
Age: 85
End: 2025-02-21

## 2025-07-25 ENCOUNTER — TELEPHONE (OUTPATIENT)
Age: 85
End: 2025-07-25

## 2025-07-25 NOTE — TELEPHONE ENCOUNTER
Pt.in Hospice care.Has SOB pitting edema.Weighs 94 pounds.Hospice is giving her Lasix.Has not been seen since 2024.Last saw Geri.Pitting edema BLE's.Just calling to make a fu appt.She has not been established w/regular cardiologist and needs one of them.She has been started on Lasix Daughter wants to wait and bring her in this Sept.for visit.Appt.made per Daughter's request for time/location that suited her schedule.

## 2025-07-25 NOTE — TELEPHONE ENCOUNTER
Pt's daughter is calling about pt having issues w/ swelling and sob. Swelling is both legs to below theknees and SOB w/ activity. Pt has no CP. Pt is currently okay.

## (undated) DEVICE — TUBING INSUFFLATION SMK EVAC HI FLO SET PNEUMOCLEAR

## (undated) DEVICE — SEALANT TISS 4 CC FIBRIN VISTASEAL

## (undated) DEVICE — SUTURE MONOCRYL SZ 4-0 L27IN ABSRB UD L19MM PS-2 1/2 CIR PRIM Y426H

## (undated) DEVICE — TROCAR: Brand: KII® SLEEVE

## (undated) DEVICE — SHEARS ENDOSCP HARM 36CM ULTRASONIC CRV TIP UPGRD

## (undated) DEVICE — CATHETER ENDOSCP L13IN DIA5FR CHOLGM W/ RADLUC INTRO SHTH

## (undated) DEVICE — SUTURE VICRYL SZ 3-0 L27IN ABSRB VLT L26MM SH 1/2 CIR J316H

## (undated) DEVICE — TROCAR: Brand: KII FIOS FIRST ENTRY

## (undated) DEVICE — DEVICE FIX 5MM TI FOR LAP HERN REP PROTACK

## (undated) DEVICE — LOGICUT SCISSOR LENGTH 320MM: Brand: LOGI - LAPAROSCOPIC INSTRUMENT SYSTEM

## (undated) DEVICE — AIRSEAL 8 MM CANNULA CAP AND OBTURATOR WITH BLADELESS OPTICAL TIP COMPATIBLE WITH INTUITIVE DA VINCI XI AND DA VINCI X 8 MM INSTRUMENT CANNULA, STANDARD LENGTH: Brand: AIRSEAL

## (undated) DEVICE — SUTURE MONOCRYL + SZ 4-0 L27IN ABSRB UD L19MM PS-2 3/8 CIR MCP426H

## (undated) DEVICE — GAUZE,SPONGE,2"X2",8PLY,STERILE,LF,2'S: Brand: MEDLINE

## (undated) DEVICE — INTENDED FOR TISSUE SEPARATION, AND OTHER PROCEDURES THAT REQUIRE A SHARP SURGICAL BLADE TO PUNCTURE OR CUT.: Brand: BARD-PARKER ® STAINLESS STEEL BLADES

## (undated) DEVICE — STRIP,CLOSURE,WOUND,MEDI-STRIP,1/2X4: Brand: MEDLINE

## (undated) DEVICE — ELECTRODE ELECSURG LAP 5 MMX33 CM J HK PTFE EZ CLN

## (undated) DEVICE — PUMP SUC IRR TBNG L10FT W/ HNDPC ASSEMB STRYKEFLOW 2

## (undated) DEVICE — TROCAR: Brand: KII® OPTICAL ACCESS SYSTEM

## (undated) DEVICE — TIP COVER ACCESSORY

## (undated) DEVICE — INTRODUCER CATHETER 8FR L35IN PERI FOR CHOLGM CATHETER TAUT

## (undated) DEVICE — CLIP INT M L POLYMER LOK LIG HEM O LOK: Type: IMPLANTABLE DEVICE | Status: NON-FUNCTIONAL

## (undated) DEVICE — SUTURE VICRYL + SZ 0 L27IN ABSRB VLT L26MM UR-6 5/8 CIR VCP603H

## (undated) DEVICE — GENERAL LAPAROSCOPY: Brand: MEDLINE INDUSTRIES, INC.

## (undated) DEVICE — GLOVE SURG SZ 75 CRM LTX FREE POLYISOPRENE POLYMER BEAD ANTI

## (undated) DEVICE — SOLUTION IRRIG 1000ML 0.9% SOD CHL USP POUR PLAS BTL

## (undated) DEVICE — NEEDLE HYPO 21GA L1.5IN INTRAMUSCULAR S STL LATCH BVL UP

## (undated) DEVICE — INSUFFLATION NEEDLE TO ESTABLISH PNEUMOPERITONEUM.: Brand: INSUFFLATION NEEDLE

## (undated) DEVICE — APPLICATOR LAP 35 CM 2 RIGID VISTASEAL

## (undated) DEVICE — JELLY LUBRICATING 10GM PREFIL SYR LUBE

## (undated) DEVICE — TISSUE RETRIEVAL SYSTEM: Brand: INZII RETRIEVAL SYSTEM

## (undated) DEVICE — SUTURE STRATAFIX SYMMETRIC SZ 1 L18IN ABSRB VLT CT1 L36CM SXPP1A404

## (undated) DEVICE — Device

## (undated) DEVICE — MHCZ GENERAL LAPAROSCOPY: Brand: MEDLINE INDUSTRIES, INC.

## (undated) DEVICE — BLADE CLIPPER GEN PURP NS

## (undated) DEVICE — LUER-LOK 360°: Brand: CONNECTA, LUER-LOK

## (undated) DEVICE — APPLIER CLP M/L SHFT DIA5MM 15 LIG LIGAMAX 5

## (undated) DEVICE — SYRINGE MED 30ML STD CLR PLAS LUERLOCK TIP N CTRL DISP

## (undated) DEVICE — SUTURE MONOCRYL + SZ 4-0 L18IN ABSRB UD L19MM PS-2 3/8 CIR MCP496G

## (undated) DEVICE — BAG SPEC REM 224ML W4XL6IN DIA10MM 1 HND GYN DISP ENDOPCH

## (undated) DEVICE — SUTURE ETHIBOND EXCEL SZ 0 L30IN NONABSORBABLE GRN L26MM SH X834H

## (undated) DEVICE — KIT,ANTI FOG,W/SPONGE & FLUID,SOFT PACK: Brand: MEDLINE

## (undated) DEVICE — 3M™ TEGADERM™ TRANSPARENT FILM DRESSING FRAME STYLE, 1624W, 2-3/8 IN X 2-3/4 IN (6 CM X 7 CM), 100/CT 4CT/CASE: Brand: 3M™ TEGADERM™

## (undated) DEVICE — AIRSEAL BIFURCATED FILTERED TUBESET WITH ACTIVATED CHARCOAL FILTER: Brand: AIRSEAL

## (undated) DEVICE — 1LYRTR 16FR10ML 100%SILI SNAP: Brand: MEDLINE INDUSTRIES, INC.

## (undated) DEVICE — DRAPE C ARM W/ POLY STRP W42XL72IN FOR MOB XR

## (undated) DEVICE — LIQUIBAND RAPID ADHESIVE 36/CS 0.8ML: Brand: MEDLINE